# Patient Record
Sex: MALE | Race: WHITE | NOT HISPANIC OR LATINO | Employment: OTHER | URBAN - METROPOLITAN AREA
[De-identification: names, ages, dates, MRNs, and addresses within clinical notes are randomized per-mention and may not be internally consistent; named-entity substitution may affect disease eponyms.]

---

## 2017-03-20 NOTE — PROGRESS NOTES
[facility]  [address] [address] [id number] [phone]  Dept: [phone]    PRE-OP EVALUATION:  Today's date: 3/24/2017    Kameron Whitley (: 1950) presents for pre-operative evaluation assessment as requested by Dr. Haynes.  He requires evaluation and anesthesia risk assessment prior to undergoing surgery/procedure for treatment of eye surgery .  Proposed procedure: Catarac of Left Eye    Date of Surgery/ Procedure: 2017  Time of Surgery/ Procedure: [facility]  Hospital/Surgical Facility: [facility] [place]  Fax number for surgical facility: [phone]  Primary Physician: Angelica Bui  Type of Anesthesia Anticipated: to be determined    Patient has a Health Care Directive or Living Will:  NO    1. NO - Do you have a history of heart attack, stroke, stent, bypass or surgery on an artery in the head, neck, heart or legs?  2. NO - Do you ever have any pain or discomfort in your chest?  3. NO - Do you have a history of  Heart Failure?  4. NO - Are you troubled by shortness of breath when: walking on the level, up a slight hill or at night?  5. NO - Do you currently have a cold, bronchitis or other respiratory infection?  6. NO - Do you have a cough, shortness of breath or wheezing?  7. NO - Do you sometimes get pains in the calves of your legs when you walk?  8. NO - Do you or anyone in your family have previous history of blood clots?  9. NO - Do you or does anyone in your family have a serious bleeding problem such as prolonged bleeding following surgeries or cuts?  10. NO - Have you ever had problems with anemia or been told to take iron pills?  11. NO - Have you had any abnormal blood loss such as black, tarry or bloody stools, or abnormal vaginal bleeding?  12. NO - Have you ever had a blood transfusion?  13. NO - Have you or any of your relatives ever had problems with anesthesia?  14. NO - Do you have sleep apnea, excessive snoring or  daytime drowsiness?  15. NO - Do you have any prosthetic heart valves?  16. NO - Do you have prosthetic joints?  17. NO - Is there any chance that you may be pregnant?      Advance Care Planning 3/20/2017:  Discussed with patient, Information given to prepare    HPI:                                                      Brief HPI related to upcoming procedure: 66 year old male , scheduled for Cataract surgery , he has been having poor and cloudy vision       See problem list for active medical problems.  Problems all longstanding and stable, except as noted/documented.  See ROS for pertinent symptoms related to these conditions.                                                                                                  .    MEDICAL HISTORY:                                                      Patient Active Problem List    Diagnosis Date Noted     Cholelithiasis with acute cholecystitis without biliary obstruction 10/05/2016     Priority: Medium     Erectile dysfunction due to diseases classified elsewhere 09/28/2016     Priority: Medium     ED (erectile dysfunction) 01/06/2014     Groin mass 04/08/2013     CARDIOVASCULAR SCREENING; LDL GOAL LESS THAN 160 03/30/2012     Impacted cerumen 03/30/2012      Past Medical History:   Diagnosis Date     Closed fracture of clavicle     Age 2 - Right Clavicle     Past Surgical History:   Procedure Laterality Date     CARPAL TUNNEL RELEASE RT/LT  1/2006    right hand     COLONOSCOPY  4/2009    Normal-f/u 10 years (At [facility])     LAPAROSCOPIC CHOLECYSTECTOMY N/A 10/5/2016    Procedure: LAPAROSCOPIC CHOLECYSTECTOMY;  Surgeon: Nikita Anderson MD;  Location:  OR     LAPAROSCOPIC HERNIORRHAPHY INGUINAL BILATERAL  1/10/2014    Procedure: LAPAROSCOPIC HERNIORRHAPHY INGUINAL BILATERAL;  Laparoscopic Bilateral Inguinal Hernia Repair;  Surgeon: Nikita Anderson MD;  Location: [facility] OR     Current Outpatient Prescriptions   Medication Sig Dispense Refill     ketorolac (ACULAR) 0.5 %  "ophthalmic solution        ofloxacin (OCUFLOX) 0.3 % ophthalmic solution        prednisoLONE acetate (PRED FORTE) 1 % ophthalmic susp        sildenafil (VIAGRA) 100 MG cap/tab Take 1 tablet (100 mg) by mouth daily as needed for erectile dysfunction Take 30 min to 4 hours before intercourse.  Never use with nitroglycerin, terazosin or doxazosin. 8 tablet 11     LOW-DOSE ASPIRIN PO Take  by mouth.       omeprazole (PRILOSEC) 40 MG capsule Take 1 capsule (40 mg) by mouth daily Take 30-60 minutes before a meal. (Patient not taking: Reported on 3/24/2017) 30 capsule 1     OTC products: None, except as noted above    No Known Allergies   Latex Allergy: NO    Social History   Substance Use Topics     Smoking status: Former Smoker     Types: Cigarettes     Smokeless tobacco: Never Used     Alcohol use 0.0 oz/week     0 Standard drinks or equivalent per week      Comment: occ     History   Drug Use No       REVIEW OF SYSTEMS:                                                    C: NEGATIVE for fever, chills, change in weight  EYES: POSITIVE for blurred vision bilateral  E/M: NEGATIVE for ear, mouth and throat problems  R: NEGATIVE for significant cough or SOB  CV: NEGATIVE for chest pain, palpitations or peripheral edema    EXAM:                                                    /60 (BP Location: Left arm, Cuff Size: Adult Regular)  Pulse 64  Temp 96.7  F (35.9  C) (Temporal)  Resp 16  Ht 5' 5\" (1.651 m)  Wt 169 lb 4.8 oz (76.8 kg)  SpO2 97%  BMI 28.17 kg/m2  GENERAL APPEARANCE: healthy, alert and no distress  HENT: ear canals and TM's normal and nose and mouth without ulcers or lesions  RESP: lungs clear to auscultation - no rales, rhonchi or wheezes  CV: regular rate and rhythm, normal S1 S2, no S3 or S4 and no murmur, click or rub   ABDOMEN: soft, nontender, no HSM or masses and bowel sounds normal  NEURO: Normal strength and tone, sensory exam grossly normal, mentation intact and speech normal    DIAGNOSTICS: "                                                    No labs or EKG required for low risk surgery (cataract, skin procedure, breast biopsy, etc)    Recent Labs   Lab Test  10/04/16   2215  01/06/14   0919  03/30/12   0929   HGB  15.6  15.4  15.7   PLT  212   --   205   NA  140   --    --    POTASSIUM  4.0   --    --    CR  1.14   --    --         IMPRESSION:                                                    Reason for surgery/procedure: Blurry Vision   Diagnosis/reason for consult: Pre op evaluu    The proposed surgical procedure is considered LOW risk.    REVISED CARDIAC RISK INDEX  The patient has the following serious cardiovascular risks for perioperative complications such as (MI, PE, VFib and 3  AV Block):  No serious cardiac risks  INTERPRETATION: 0 risks: Class I (very low risk - 0.4% complication rate)    The patient has the following additional risks for perioperative complications:  No identified additional risks      ICD-10-CM    1. Preop general physical exam Z01.818    2. Cataract H26.9     left eye   3. Erectile dysfunction due to diseases classified elsewhere N52.1 sildenafil (VIAGRA) 100 MG cap/tab     DISCONTINUED: sildenafil (VIAGRA) 100 MG cap/tab       RECOMMENDATIONS:                                                      --Consult hospital rounder / IM to assist post-op medical management PRN     --Patient is to take all scheduled medications on the day of surgery EXCEPT for modifications listed below.    APPROVAL GIVEN to proceed with proposed procedure, without further diagnostic evaluation       Signed Electronically by: Angelica Bui MD, MD    Copy of this evaluation report is provided to requesting physician.    [facility] Preop Guidelines

## 2017-03-24 ENCOUNTER — OFFICE VISIT (OUTPATIENT)
Dept: FAMILY MEDICINE | Facility: OTHER | Age: 67
End: 2017-03-24
Payer: COMMERCIAL

## 2017-03-24 VITALS
SYSTOLIC BLOOD PRESSURE: 110 MMHG | HEART RATE: 64 BPM | TEMPERATURE: 96.7 F | RESPIRATION RATE: 16 BRPM | BODY MASS INDEX: 28.21 KG/M2 | DIASTOLIC BLOOD PRESSURE: 60 MMHG | WEIGHT: 169.3 LBS | OXYGEN SATURATION: 97 % | HEIGHT: 65 IN

## 2017-03-24 DIAGNOSIS — N52.1 ERECTILE DYSFUNCTION DUE TO DISEASES CLASSIFIED ELSEWHERE: ICD-10-CM

## 2017-03-24 DIAGNOSIS — Z01.818 PREOP GENERAL PHYSICAL EXAM: Primary | ICD-10-CM

## 2017-03-24 DIAGNOSIS — H26.9 CATARACT: ICD-10-CM

## 2017-03-24 PROCEDURE — 99214 OFFICE O/P EST MOD 30 MIN: CPT | Performed by: FAMILY MEDICINE

## 2017-03-24 RX ORDER — SILDENAFIL 100 MG/1
100 TABLET, FILM COATED ORAL DAILY PRN
Qty: 8 TABLET | Refills: 11 | Status: SHIPPED | OUTPATIENT
Start: 2017-03-24 | End: 2017-03-24

## 2017-03-24 RX ORDER — KETOROLAC TROMETHAMINE 5 MG/ML
SOLUTION OPHTHALMIC
COMMUNITY
Start: 2017-03-17 | End: 2019-02-08

## 2017-03-24 RX ORDER — OFLOXACIN 3 MG/ML
SOLUTION/ DROPS OPHTHALMIC
COMMUNITY
Start: 2017-03-17 | End: 2019-02-08

## 2017-03-24 RX ORDER — PREDNISOLONE ACETATE 10 MG/ML
SUSPENSION/ DROPS OPHTHALMIC
COMMUNITY
Start: 2017-03-14 | End: 2018-11-16

## 2017-03-24 RX ORDER — SILDENAFIL 100 MG/1
100 TABLET, FILM COATED ORAL DAILY PRN
Qty: 8 TABLET | Refills: 11 | Status: SHIPPED | OUTPATIENT
Start: 2017-03-24 | End: 2017-11-09

## 2017-03-24 ASSESSMENT — PAIN SCALES - GENERAL: PAINLEVEL: NO PAIN (0)

## 2017-03-24 NOTE — NURSING NOTE
"Chief Complaint   Patient presents with     Pre-Op Exam     Panel Management     Pao Bower       Initial /60 (BP Location: Left arm, Cuff Size: Adult Regular)  Pulse 64  Temp 96.7  F (35.9  C) (Temporal)  Resp 16  Ht 5' 5\" (1.651 m)  Wt 169 lb 4.8 oz (76.8 kg)  SpO2 97%  BMI 28.17 kg/m2 Estimated body mass index is 28.17 kg/(m^2) as calculated from the following:    Height as of this encounter: 5' 5\" (1.651 m).    Weight as of this encounter: 169 lb 4.8 oz (76.8 kg).  Medication Reconciliation: complete     Marleny Adams MA    "

## 2017-03-24 NOTE — MR AVS SNAPSHOT
After Visit Summary   3/24/2017    Kameron Whitley    MRN: [id number]           Patient Information     Date Of Birth          1950        Visit Information        Provider Department      3/24/2017 8:40 AM Angelica Bui MD [facility]        Today's Diagnoses     Preop general physical exam    -  1    Erectile dysfunction due to diseases classified elsewhere          Care Instructions      Before Your Surgery      Call your surgeon if there is any change in your health. This includes signs of a cold or flu (such as a sore throat, runny nose, cough, rash or fever).    Do not smoke, drink alcohol or take over the counter medicine (unless your surgeon or primary care doctor tells you to) for the 24 hours before and after surgery.    If you take prescribed drugs: Follow your doctor s orders about which medicines to take and which to stop until after surgery.    Eating and drinking prior to surgery: follow the instructions from your surgeon    Take a shower or bath the night before surgery. Use the soap your surgeon gave you to gently clean your skin. If you do not have soap from your surgeon, use your regular soap. Do not shave or scrub the surgery site.  Wear clean pajamas and have clean sheets on your bed.         Follow-ups after your visit        Who to contact     If you have questions or need follow up information about today's clinic visit or your schedule please contact [facility] directly at [phone].  Normal or non-critical lab and imaging results will be communicated to you by MyChart, letter or phone within 4 business days after the clinic has received the results. If you do not hear from us within 7 days, please contact the clinic through MyChart or phone. If you have a critical or abnormal lab result, we will notify you by phone as soon as possible.  Submit refill requests through "[employer] or call your pharmacy and they will forward the  "refill request to us. Please allow 3 business days for your refill to be completed.          Additional Information About Your Visit        MyChart Information     Access Closure lets you send messages to your doctor, view your test results, renew your prescriptions, schedule appointments and more. To sign up, go to [email or web address] . Click on \"Log in\" on the left side of the screen, which will take you to the Welcome page. Then click on \"Sign up Now\" on the right side of the page.     You will be asked to enter the access code listed below, as well as some personal information. Please follow the directions to create your username and password.     Your access code is: [email or web address]  Expires: 2017  9:19 AM     Your access code will  in 90 days. If you need help or a new code, please call your [place] clinic or [phone].        Care EveryWhere ID     This is your Care EveryWhere ID. This could be used by other organizations to access your [place] medical records  [id number]        Your Vitals Were     Pulse Temperature Respirations Height Pulse Oximetry BMI (Body Mass Index)    64 96.7  F (35.9  C) (Temporal) 16 5' 5\" (1.651 m) 97% 28.17 kg/m2       Blood Pressure from Last 3 Encounters:   17 110/60   10/24/16 120/76   10/05/16 114/84    Weight from Last 3 Encounters:   17 169 lb 4.8 oz (76.8 kg)   10/24/16 165 lb 14.4 oz (75.3 kg)   10/14/16 165 lb (74.8 kg)              Today, you had the following     No orders found for display         Today's Medication Changes          These changes are accurate as of: 3/24/17  9:19 AM.  If you have any questions, ask your nurse or doctor.               These medicines have changed or have updated prescriptions.        Dose/Directions    sildenafil 100 MG cap/tab   Commonly known as:  VIAGRA   This may have changed:    - medication strength  - how much to take   Used for:  Erectile dysfunction due to diseases classified elsewhere   Changed by:  " Angelica Bui MD        Dose:  100 mg   Take 1 tablet (100 mg) by mouth daily as needed for erectile dysfunction Take 30 min to 4 hours before intercourse.  Never use with nitroglycerin, terazosin or doxazosin.   Quantity:  8 tablet   Refills:  11            Where to get your medicines      Some of these will need a paper prescription and others can be bought over the counter.  Ask your nurse if you have questions.     Bring a paper prescription for each of these medications     sildenafil 100 MG cap/tab                Primary Care Provider Office Phone # Fax #    Angelica Bui [phone]       [facility] [address] [place] [address]        Thank you!     Thank you for choosing [facility]  for your care. Our goal is always to provide you with excellent care. Hearing back from our patients is one way we can continue to improve our services. Please take a few minutes to complete the written survey that you may receive in the mail after your visit with us. Thank you!             Your Updated Medication List - Protect others around you: Learn how to safely use, store and throw away your medicines at www.disposemymeds.org.          This list is accurate as of: 3/24/17  9:19 AM.  Always use your most recent med list.                   Brand Name Dispense Instructions for use    ketorolac 0.5 % ophthalmic solution    ACULAR         LOW-DOSE ASPIRIN PO      Take  by mouth.       ofloxacin 0.3 % ophthalmic solution    OCUFLOX         omeprazole 40 MG capsule    priLOSEC    30 capsule    Take 1 capsule (40 mg) by mouth daily Take 30-60 minutes before a meal.       prednisoLONE acetate 1 % ophthalmic susp    PRED FORTE         sildenafil 100 MG cap/tab    VIAGRA    8 tablet    Take 1 tablet (100 mg) by mouth daily as needed for erectile dysfunction Take 30 min to 4 hours before intercourse.  Never use with nitroglycerin, terazosin or doxazosin.

## 2017-05-01 NOTE — PROGRESS NOTES
[facility]  [address] [id number] [phone]  Dept: [phone]    PRE-OP EVALUATION:  Today's date: 5/3/2017    Kameron Whitley (: 1950) presents for pre-operative evaluation assessment as requested by Dr. Haynes.  He requires evaluation and anesthesia risk assessment prior to undergoing surgery/procedure for treatment of eye surgery .  Proposed procedure: Cataract of Right Eye    Date of Surgery/ Procedure: 2017  Time of Surgery/ Procedure: [facility]  Hospital/Surgical Facility: [facility]  Fax number for surgical facility: [phone]  Primary Physician: Angelica Bui  Type of Anesthesia Anticipated: to be determined    Patient has a Health Care Directive or Living Will:  NO    1. NO - Do you have a history of heart attack, stroke, stent, bypass or surgery on an artery in the head, neck, heart or legs?  2. NO - Do you ever have any pain or discomfort in your chest?  3. NO - Do you have a history of  Heart Failure?  4. NO - Are you troubled by shortness of breath when: walking on the level, up a slight hill or at night?  5. NO - Do you currently have a cold, bronchitis or other respiratory infection?  6. NO - Do you have a cough, shortness of breath or wheezing?  7. NO - Do you sometimes get pains in the calves of your legs when you walk?  8. NO - Do you or anyone in your family have previous history of blood clots?  9. NO - Do you or does anyone in your family have a serious bleeding problem such as prolonged bleeding following surgeries or cuts?  10. NO - Have you ever had problems with anemia or been told to take iron pills?  11. NO - Have you had any abnormal blood loss such as black, tarry or bloody stools, or abnormal vaginal bleeding?  12. NO - Have you ever had a blood transfusion?  13. Yes - Have you or any of your relatives ever had problems with anesthesia?  14. NO - Do you have sleep apnea, excessive snoring  or daytime drowsiness?  15. NO - Do you have any prosthetic heart valves?  16. NO - Do you have prosthetic joints?  17. NO - Is there any chance that you may be pregnant?      HPI:                                                      Brief HPI related to upcoming procedure: Brief HPI related to upcoming procedure: 66 year old male , scheduled for Cataract surgery , he has been having poor and cloudy vision . Recently had left eye done and now scheduled for the right         See problem list for active medical problems. Problems all longstanding and stable, except as noted/documented. See ROS for pertinent symptoms related to these conditions.         MEDICAL HISTORY:                                                      Patient Active Problem List    Diagnosis Date Noted     Cholelithiasis with acute cholecystitis without biliary obstruction 10/05/2016     Priority: Medium     Erectile dysfunction due to diseases classified elsewhere 09/28/2016     Priority: Medium     ED (erectile dysfunction) 01/06/2014     Groin mass 04/08/2013     CARDIOVASCULAR SCREENING; LDL GOAL LESS THAN 160 03/30/2012     Impacted cerumen 03/30/2012      Past Medical History:   Diagnosis Date     Closed fracture of clavicle     Age 2 - Right Clavicle     Past Surgical History:   Procedure Laterality Date     CARPAL TUNNEL RELEASE RT/LT  1/2006    right hand     COLONOSCOPY  4/2009    Normal-f/u 10 years (At [facility])     LAPAROSCOPIC CHOLECYSTECTOMY N/A 10/5/2016    Procedure: LAPAROSCOPIC CHOLECYSTECTOMY;  Surgeon: Nikita Anderson MD;  Location:  OR     LAPAROSCOPIC HERNIORRHAPHY INGUINAL BILATERAL  1/10/2014    Procedure: LAPAROSCOPIC HERNIORRHAPHY INGUINAL BILATERAL;  Laparoscopic Bilateral Inguinal Hernia Repair;  Surgeon: Nikita Anderson MD;  Location:  OR     Current Outpatient Prescriptions   Medication Sig Dispense Refill     ketorolac (ACULAR) 0.5 % ophthalmic solution        ofloxacin (OCUFLOX) 0.3 % ophthalmic solution     "    prednisoLONE acetate (PRED FORTE) 1 % ophthalmic susp        sildenafil (VIAGRA) 100 MG cap/tab Take 1 tablet (100 mg) by mouth daily as needed for erectile dysfunction Take 30 min to 4 hours before intercourse.  Never use with nitroglycerin, terazosin or doxazosin. 8 tablet 11     LOW-DOSE ASPIRIN PO Take  by mouth.       omeprazole (PRILOSEC) 40 MG capsule Take 1 capsule (40 mg) by mouth daily Take 30-60 minutes before a meal. (Patient not taking: Reported on 5/3/2017) 30 capsule 1     OTC products: None, except as noted above    No Known Allergies   Latex Allergy: NO    Social History   Substance Use Topics     Smoking status: Former Smoker     Types: Cigarettes     Smokeless tobacco: Never Used     Alcohol use 0.0 oz/week     0 Standard drinks or equivalent per week      Comment: occ     History   Drug Use No       REVIEW OF SYSTEMS:                                                    C: NEGATIVE for fever, chills, change in weight  EYES: POSITIVE for visual  E/M: NEGATIVE for ear, mouth and throat problems  R: NEGATIVE for significant cough or SOB  CV: NEGATIVE for chest pain, palpitations or peripheral edema    EXAM:                                                    /68  Pulse 55  Temp 97.4  F (36.3  C) (Temporal)  Resp 16  Ht 5' 5\" (1.651 m)  Wt 167 lb 3.2 oz (75.8 kg)  SpO2 99%  BMI 27.82 kg/m2  GENERAL APPEARANCE:  alert and no distress  HENT: ear canals and TM's normal and nose and mouth without ulcers or lesions  RESP: lungs clear to auscultation - no rales, rhonchi or wheezes  CV: regular rate and rhythm, normal S1 S2, no S3 or S4 and no murmur, click or rub   ABDOMEN: soft, nontender, no HSM or masses and bowel sounds normal  NEURO: Normal strength and tone, sensory exam grossly normal, mentation intact and speech normal    DIAGNOSTICS:                                                    No labs or EKG required for low risk surgery (cataract, skin procedure, breast biopsy, etc)    Recent " Labs   Lab Test  10/04/16   2215  01/06/14   0919  03/30/12   0929   HGB  15.6  15.4  15.7   PLT  212   --   205   NA  140   --    --    POTASSIUM  4.0   --    --    CR  1.14   --    --         IMPRESSION:                                                    Reason for surgery/procedure: Visual problems  Diagnosis/reason for consult: Pre op evaluation for anesthetic risk     The proposed surgical procedure is considered LOW risk.    REVISED CARDIAC RISK INDEX  The patient has the following serious cardiovascular risks for perioperative complications such as (MI, PE, VFib and 3  AV Block):  No serious cardiac risks  INTERPRETATION: 0 risks: Class I (very low risk - 0.4% complication rate)    The patient has the following additional risks for perioperative complications:  No identified additional risks      ICD-10-CM    1. Preop general physical exam Z01.818        RECOMMENDATIONS:                                                      --Consult hospital rounder / IM to assist post-op medical management PRN     --Patient is to take all scheduled medications on the day of surgery EXCEPT for modifications listed below.    APPROVAL GIVEN to proceed with proposed procedure, without further diagnostic evaluation       Signed Electronically by: Angelica Bui MD, MD    Copy of this evaluation report is provided to requesting physician.    [place] Preop Guidelines

## 2017-05-03 ENCOUNTER — OFFICE VISIT (OUTPATIENT)
Dept: FAMILY MEDICINE | Facility: OTHER | Age: 67
End: 2017-05-03
Payer: COMMERCIAL

## 2017-05-03 VITALS
BODY MASS INDEX: 27.86 KG/M2 | DIASTOLIC BLOOD PRESSURE: 68 MMHG | SYSTOLIC BLOOD PRESSURE: 116 MMHG | HEART RATE: 55 BPM | RESPIRATION RATE: 16 BRPM | TEMPERATURE: 97.4 F | WEIGHT: 167.2 LBS | HEIGHT: 65 IN | OXYGEN SATURATION: 99 %

## 2017-05-03 DIAGNOSIS — H26.9 CATARACT: ICD-10-CM

## 2017-05-03 DIAGNOSIS — Z01.818 PREOP GENERAL PHYSICAL EXAM: Primary | ICD-10-CM

## 2017-05-03 PROCEDURE — 99214 OFFICE O/P EST MOD 30 MIN: CPT | Performed by: FAMILY MEDICINE

## 2017-05-03 ASSESSMENT — PAIN SCALES - GENERAL: PAINLEVEL: NO PAIN (0)

## 2017-05-03 NOTE — MR AVS SNAPSHOT
After Visit Summary   5/3/2017    Kameron Whitley    MRN: [id number]           Patient Information     Date Of Birth          1950        Visit Information        Provider Department      5/3/2017 8:40 AM Angelica Bui MD [facility]        Today's Diagnoses     Preop general physical exam    -  1      Care Instructions      Before Your Surgery      Call your surgeon if there is any change in your health. This includes signs of a cold or flu (such as a sore throat, runny nose, cough, rash or fever).    Do not smoke, drink alcohol or take over the counter medicine (unless your surgeon or primary care doctor tells you to) for the 24 hours before and after surgery.    If you take prescribed drugs: Follow your doctor s orders about which medicines to take and which to stop until after surgery.    Eating and drinking prior to surgery: follow the instructions from your surgeon    Take a shower or bath the night before surgery. Use the soap your surgeon gave you to gently clean your skin. If you do not have soap from your surgeon, use your regular soap. Do not shave or scrub the surgery site.  Wear clean pajamas and have clean sheets on your bed.         Follow-ups after your visit        Who to contact     If you have questions or need follow up information about today's clinic visit or your schedule please contact [facility] directly at [phone].  Normal or non-critical lab and imaging results will be communicated to you by MyChart, letter or phone within 4 business days after the clinic has received the results. If you do not hear from us within 7 days, please contact the clinic through MyChart or phone. If you have a critical or abnormal lab result, we will notify you by phone as soon as possible.  Submit refill requests through [employer] or call your pharmacy and they will forward the refill request to us. Please allow 3 business days for your refill to  "be completed.          Additional Information About Your Visit        [employer] Information     [email or web address] lets you send messages to your doctor, view your test results, renew your prescriptions, schedule appointments and more. To sign up, go to [email or web address] . Click on \"Log in\" on the left side of the screen, which will take you to the Welcome page. Then click on \"Sign up Now\" on the right side of the page.     You will be asked to enter the access code listed below, as well as some personal information. Please follow the directions to create your username and password.     Your access code is: [email or web address]  Expires: 2017  9:19 AM     Your access code will  in 90 days. If you need help or a new code, please call your [facility] clinic or [phone].        Care EveryWhere ID     This is your Care EveryWhere ID. This could be used by other organizations to access your [facility] medical records  [id number]        Your Vitals Were     Pulse Temperature Respirations Height Pulse Oximetry BMI (Body Mass Index)    55 97.4  F (36.3  C) (Temporal) 16 5' 5\" (1.651 m) 99% 27.82 kg/m2       Blood Pressure from Last 3 Encounters:   17 116/68   17 110/60   10/24/16 120/76    Weight from Last 3 Encounters:   17 167 lb 3.2 oz (75.8 kg)   17 169 lb 4.8 oz (76.8 kg)   10/24/16 165 lb 14.4 oz (75.3 kg)              Today, you had the following     No orders found for display       Primary Care Provider Office Phone # Fax #    Angelica Bui [phone]       [facility] [address] [place] [address]        Thank you!     Thank you for choosing [facility]  for your care. Our goal is always to provide you with excellent care. Hearing back from our patients is one way we can continue to improve our services. Please take a few minutes to complete the written survey that you may receive in the mail after your visit with us. Thank you!      "        Your Updated Medication List - Protect others around you: Learn how to safely use, store and throw away your medicines at www.disposemymeds.org.          This list is accurate as of: 5/3/17  9:08 AM.  Always use your most recent med list.                   Brand Name Dispense Instructions for use    ketorolac 0.5 % ophthalmic solution    ACULAR         LOW-DOSE ASPIRIN PO      Take  by mouth.       ofloxacin 0.3 % ophthalmic solution    OCUFLOX         omeprazole 40 MG capsule    priLOSEC    30 capsule    Take 1 capsule (40 mg) by mouth daily Take 30-60 minutes before a meal.       prednisoLONE acetate 1 % ophthalmic susp    PRED FORTE         sildenafil 100 MG cap/tab    VIAGRA    8 tablet    Take 1 tablet (100 mg) by mouth daily as needed for erectile dysfunction Take 30 min to 4 hours before intercourse.  Never use with nitroglycerin, terazosin or doxazosin.

## 2017-05-03 NOTE — NURSING NOTE
"Chief Complaint   Patient presents with     Pre-Op Exam     Panel Management     MyChart, Honoring judy       Initial /68  Pulse 55  Temp 97.4  F (36.3  C) (Temporal)  Resp 16  Ht 5' 5\" (1.651 m)  Wt 167 lb 3.2 oz (75.8 kg)  SpO2 99%  BMI 27.82 kg/m2 Estimated body mass index is 27.82 kg/(m^2) as calculated from the following:    Height as of this encounter: 5' 5\" (1.651 m).    Weight as of this encounter: 167 lb 3.2 oz (75.8 kg).  Medication Reconciliation: complete       Marleny Adams MA    "

## 2017-11-07 NOTE — PATIENT INSTRUCTIONS
Preventive Health Recommendations:   Male Ages 65 and over    Yearly exam:             See your health care provider every year in order to  o   Review health changes.   o   Discuss preventive care.    o   Review your medicines if your doctor has prescribed any.    Talk with your health care provider about whether you should have a test to screen for prostate cancer (PSA).    Every 3 years, have a diabetes test (fasting glucose). If you are at risk for diabetes, you should have this test more often.    Every 5 years, have a cholesterol test. Have this test more often if you are at risk for high cholesterol or heart disease.     Every 10 years, have a colonoscopy. Or, have a yearly FIT test (stool test). These exams will check for colon cancer.    Talk to with your health care provider about screening for Abdominal Aortic Aneurysm if you have a family history of AAA or have a history of smoking.    Shots:     Get a flu shot each year.     Get a tetanus shot every 10 years.     Talk to your doctor about your pneumonia vaccines. There are now two you should receive - Pneumovax (PPSV 23) and Prevnar (PCV 13).     Talk to your doctor about a shingles vaccine.     Talk to your doctor about the hepatitis B vaccine.  Nutrition:     Eat at least 5 servings of fruits and vegetables each day.     Eat whole-grain bread, whole-wheat pasta and brown rice instead of white grains and rice.     Talk to your provider about Calcium and Vitamin D.   Lifestyle    Exercise for at least 150 minutes a week (30 minutes a day, 5 days a week). This will help you control your weight and prevent disease.     Limit alcohol to one drink per day.     No smoking.     Wear sunscreen to prevent skin cancer.     See your dentist every six months for an exam and cleaning.     See your eye doctor every 1 to 2 years to screen for conditions such as glaucoma, macular degeneration, cataracts, etc 9

## 2017-11-07 NOTE — PROGRESS NOTES
SUBJECTIVE:   Kameron Whitley is a 67 year old male who presents for Preventive Visit.  Are you in the first 12 months of your Medicare coverage?  No    HPI     The 10-year ASCVD risk score (Angy DEE Jr, et al., 2013) is: 11.6%    Values used to calculate the score:      Age: 67 years      Sex: Male      Is Non- : No      Diabetic: No      Tobacco smoker: No      Systolic Blood Pressure: 116 mmHg      Is BP treated: No      HDL Cholesterol: 86 mg/dL      Total Cholesterol: 215 mg/dL  Patient is eligible for use of low-dose aspirin for primary prevention of heart attack and stroke.  Provider has discussed aspirin with patient and our decision was:     Prescribe:  Daily low-dose aspirin recommended for primary prevention, patient agrees with plan.          COGNITIVE SCREEN  1) Repeat 3 items (Banana, Sunrise, Chair)    2) Clock draw: NORMAL  3) 3 item recall: Recalls 3 objects  Results: 3 items recalled: COGNITIVE IMPAIRMENT LESS LIKELY    Mini-CogTM Copyright S Miko. Licensed by the author for use in [facility]; reprinted with permission (isrrael@[facility]). All rights reserved.          Reviewed and updated as needed this visit by clinical staff         Reviewed and updated as needed this visit by Provider      Social History   Substance Use Topics     Smoking status: Former Smoker     Types: Cigarettes     Smokeless tobacco: Never Used     Alcohol use 0.0 oz/week     0 Standard drinks or equivalent per week      Comment: occ       The patient does not drink >3 drinks per day nor >7 drinks per week.        Today's PHQ-2 Score: PHQ-2 ( 1999 Pfizer) 10/24/2016   Q1: Little interest or pleasure in doing things 0   Q2: Feeling down, depressed or hopeless 0   PHQ-2 Score 0       Do you feel safe in your environment - Yes    Do you have a Health Care Directive?: No: Advance care planning reviewed with patient; information given to patient to review.      Current providers sharing in care  for this patient include: No care team member to display      Hearing impairment: No    Ability to successfully perform activities of daily living: Yes, no assistance needed     Fall risk:         Home safety:  none identified      The following health maintenance items are reviewed in Epic and correct as of today:Health Maintenance   Topic Date Due     ADVANCE DIRECTIVE PLANNING Q5 YRS  09/17/2003     INFLUENZA VACCINE (SYSTEM ASSIGNED)  09/01/2017     FALL RISK ASSESSMENT  09/28/2017     LIPID SCREEN Q5 YR MALE (SYSTEM ASSIGNED)  09/28/2021     COLON CANCER SCREEN (SYSTEM ASSIGNED)  08/26/2023     TETANUS IMMUNIZATION (SYSTEM ASSIGNED)  03/06/2025     PNEUMOCOCCAL  Completed     AORTIC ANEURYSM SCREENING (SYSTEM ASSIGNED)  Completed     HEPATITIS C SCREENING  Completed     Labs reviewed in EPIC  BP Readings from Last 3 Encounters:   11/09/17 132/80   05/03/17 116/68   03/24/17 110/60    Wt Readings from Last 3 Encounters:   11/09/17 169 lb 9.6 oz (76.9 kg)   05/03/17 167 lb 3.2 oz (75.8 kg)   03/24/17 169 lb 4.8 oz (76.8 kg)                  Patient Active Problem List   Diagnosis     CARDIOVASCULAR SCREENING; LDL GOAL LESS THAN 160     Impacted cerumen     Groin mass     ED (erectile dysfunction)     Erectile dysfunction due to diseases classified elsewhere     Cholelithiasis with acute cholecystitis without biliary obstruction     Hyperlipidemia LDL goal <130     Elevated blood pressure reading without diagnosis of hypertension     Disturbance of skin sensation     Past Surgical History:   Procedure Laterality Date     CARPAL TUNNEL RELEASE RT/LT  1/2006    right hand     COLONOSCOPY  4/2009    Normal-f/u 10 years (At [facility])     LAPAROSCOPIC CHOLECYSTECTOMY N/A 10/5/2016    Procedure: LAPAROSCOPIC CHOLECYSTECTOMY;  Surgeon: Nikita Anderson MD;  Location: [facility] OR     LAPAROSCOPIC HERNIORRHAPHY INGUINAL BILATERAL  1/10/2014    Procedure: LAPAROSCOPIC HERNIORRHAPHY INGUINAL BILATERAL;  Laparoscopic Bilateral  "Inguinal Hernia Repair;  Surgeon: Nikita Anderson MD;  Location:  OR       Social History   Substance Use Topics     Smoking status: Former Smoker     Types: Cigarettes     Smokeless tobacco: Never Used     Alcohol use 0.0 oz/week     0 Standard drinks or equivalent per week      Comment: occ     Family History   Problem Relation Age of Onset     DIABETES Mother      Arthritis Mother      DIABETES Brother      DIABETES Brother          Current Outpatient Prescriptions   Medication Sig Dispense Refill     sildenafil (VIAGRA) 100 MG tablet Take 1 tablet (100 mg) by mouth daily as needed Take 30 min to 4 hours before intercourse.  Never use with nitroglycerin, terazosin or doxazosin. 8 tablet 11     LOW-DOSE ASPIRIN PO Take  by mouth.       ketorolac (ACULAR) 0.5 % ophthalmic solution        ofloxacin (OCUFLOX) 0.3 % ophthalmic solution        prednisoLONE acetate (PRED FORTE) 1 % ophthalmic susp        [DISCONTINUED] sildenafil (VIAGRA) 100 MG cap/tab Take 1 tablet (100 mg) by mouth daily as needed for erectile dysfunction Take 30 min to 4 hours before intercourse.  Never use with nitroglycerin, terazosin or doxazosin. 8 tablet 11     omeprazole (PRILOSEC) 40 MG capsule Take 1 capsule (40 mg) by mouth daily Take 30-60 minutes before a meal. (Patient not taking: Reported on 5/3/2017) 30 capsule 1     No Known Allergies        Review of Systems  Constitutional, HEENT, cardiovascular, pulmonary, GI, , musculoskeletal, neuro, skin, endocrine and psych systems are negative, except as otherwise noted.      OBJECTIVE:   There were no vitals taken for this visit. Estimated body mass index is 27.82 kg/(m^2) as calculated from the following:    Height as of 5/3/17: 5' 5\" (1.651 m).    Weight as of 5/3/17: 167 lb 3.2 oz (75.8 kg).  Physical Exam  GENERAL: healthy, alert and no distress  EYES: Eyes grossly normal to inspection, PERRL and conjunctivae and sclerae normal  HENT: ear canals and TM's normal, nose and mouth " without ulcers or lesions  NECK: no adenopathy, no asymmetry, masses, or scars and thyroid normal to palpation  RESP: lungs clear to auscultation - no rales, rhonchi or wheezes  CV: regular rate and rhythm, normal S1 S2, no S3 or S4, no murmur, click or rub, no peripheral edema and peripheral pulses strong  ABDOMEN: soft, nontender, no hepatosplenomegaly, no masses and bowel sounds normal  MS: mild arthritic changes noted especially to the right index finger.  no gross musculoskeletal defects noted, no edema  SKIN: no suspicious lesions or rashes  NEURO: Normal strength and tone, mentation intact and speech normal  PSYCH: mentation appears normal, affect normal/bright    ASSESSMENT / PLAN:   1. Routine general medical examination at a health care facility  As above doing well  - CBC with platelets  - Comprehensive metabolic panel  - Lipid panel reflex to direct LDL Fasting    2. At risk for falling      3. Need for prophylactic vaccination and inoculation against influenza  declines    4. Elevated blood pressure reading without diagnosis of hypertension  Recheck in 2 weeks  - CBC with platelets  - Comprehensive metabolic panel    5. Hyperlipidemia LDL goal <130  Due for labsa  - Comprehensive metabolic panel  - Lipid panel reflex to direct LDL Fasting    6. Finger deformity, acquired, right  Arthritis suspected  - ORTHOPEDICS ADULT REFERRAL    7. Arthritis  - ORTHOPEDICS ADULT REFERRAL    8. Erectile dysfunction due to diseases classified elsewhere  refilled  - sildenafil (VIAGRA) 100 MG tablet; Take 1 tablet (100 mg) by mouth daily as needed Take 30 min to 4 hours before intercourse.  Never use with nitroglycerin, terazosin or doxazosin.  Dispense: 8 tablet; Refill: 11    End of Life Planning:  Patient currently has an advanced directive: No.  I have verified the patient's ablity to prepare an advanced directive/make health care decisions.  Literature was provided to assist patient in preparing an advanced  "directive.    COUNSELING:  Reviewed preventive health counseling, as reflected in patient instructions       Consider AAA screening for ages 65-75 and smoking history       Regular exercise       Healthy diet/nutrition       Vision screening       Hearing screening       Dental care    BP Screening:   Last 3 BP Readings:    BP Readings from Last 3 Encounters:   11/09/17 132/80   05/03/17 116/68   03/24/17 110/60       The following was recommended to the patient:  Re-screen within 4 weeks and recommend lifestyle modifications    Estimated body mass index is 27.82 kg/(m^2) as calculated from the following:    Height as of 5/3/17: 5' 5\" (1.651 m).    Weight as of 5/3/17: 167 lb 3.2 oz (75.8 kg).  Weight management plan: Discussed healthy diet and exercise guidelines and patient will follow up in 6 months in clinic to re-evaluate.   reports that he has quit smoking. His smoking use included Cigarettes. He has never used smokeless tobacco.        Appropriate preventive services were discussed with this patient, including applicable screening as appropriate for cardiovascular disease, diabetes, osteopenia/osteoporosis, and glaucoma.  As appropriate for age/gender, discussed screening for colorectal cancer, prostate cancer, breast cancer, and cervical cancer. Checklist reviewing preventive services available has been given to the patient.    Reviewed patients plan of care and provided an AVS. The Basic Care Plan (routine screening as documented in Health Maintenance) for Kameron meets the Care Plan requirement. This Care Plan has been established and reviewed with the Patient.    Counseling Resources:  ATP IV Guidelines  Pooled Cohorts Equation Calculator  Breast Cancer Risk Calculator  FRAX Risk Assessment  ICSI Preventive Guidelines  Dietary Guidelines for Americans, 2010  USDA's MyPlate  ASA Prophylaxis  Lung CA Screening    Nicola Banerjee PA-C  [facility] for HPI/ROS submitted by the patient on " 11/9/2017   PHQ-2 Score: 0  If you checked off any problems, how difficult have these problems made it for you to do your work, take care of things at home, or get along with other people?: Not difficult at all  PHQ9 TOTAL SCORE: 0  PETER 7 TOTAL SCORE: 0

## 2017-11-09 ENCOUNTER — OFFICE VISIT (OUTPATIENT)
Dept: FAMILY MEDICINE | Facility: OTHER | Age: 67
End: 2017-11-09
Payer: COMMERCIAL

## 2017-11-09 VITALS
TEMPERATURE: 97.7 F | SYSTOLIC BLOOD PRESSURE: 132 MMHG | BODY MASS INDEX: 28.95 KG/M2 | HEIGHT: 64 IN | WEIGHT: 169.6 LBS | HEART RATE: 52 BPM | DIASTOLIC BLOOD PRESSURE: 80 MMHG | RESPIRATION RATE: 18 BRPM

## 2017-11-09 DIAGNOSIS — N52.1 ERECTILE DYSFUNCTION DUE TO DISEASES CLASSIFIED ELSEWHERE: ICD-10-CM

## 2017-11-09 DIAGNOSIS — Z23 NEED FOR PROPHYLACTIC VACCINATION AND INOCULATION AGAINST INFLUENZA: ICD-10-CM

## 2017-11-09 DIAGNOSIS — M19.90 ARTHRITIS: ICD-10-CM

## 2017-11-09 DIAGNOSIS — R03.0 ELEVATED BLOOD PRESSURE READING WITHOUT DIAGNOSIS OF HYPERTENSION: ICD-10-CM

## 2017-11-09 DIAGNOSIS — Z91.81 AT RISK FOR FALLING: ICD-10-CM

## 2017-11-09 DIAGNOSIS — E78.5 HYPERLIPIDEMIA LDL GOAL <130: ICD-10-CM

## 2017-11-09 DIAGNOSIS — M20.001 FINGER DEFORMITY, ACQUIRED, RIGHT: ICD-10-CM

## 2017-11-09 DIAGNOSIS — Z00.00 ROUTINE GENERAL MEDICAL EXAMINATION AT A HEALTH CARE FACILITY: Primary | ICD-10-CM

## 2017-11-09 LAB
ALBUMIN SERPL-MCNC: 3.5 G/DL (ref 3.4–5)
ALP SERPL-CCNC: 65 U/L (ref 40–150)
ALT SERPL W P-5'-P-CCNC: 30 U/L (ref 0–70)
ANION GAP SERPL CALCULATED.3IONS-SCNC: 6 MMOL/L (ref 3–14)
AST SERPL W P-5'-P-CCNC: 25 U/L (ref 0–45)
BILIRUB SERPL-MCNC: 0.7 MG/DL (ref 0.2–1.3)
BUN SERPL-MCNC: 17 MG/DL (ref 7–30)
CALCIUM SERPL-MCNC: 8.7 MG/DL (ref 8.5–10.1)
CHLORIDE SERPL-SCNC: 106 MMOL/L (ref 94–109)
CHOLEST SERPL-MCNC: 175 MG/DL
CO2 SERPL-SCNC: 28 MMOL/L (ref 20–32)
CREAT SERPL-MCNC: 0.9 MG/DL (ref 0.66–1.25)
ERYTHROCYTE [DISTWIDTH] IN BLOOD BY AUTOMATED COUNT: 12.8 % (ref 10–15)
GFR SERPL CREATININE-BSD FRML MDRD: 84 ML/MIN/1.7M2
GLUCOSE SERPL-MCNC: 93 MG/DL (ref 70–99)
HCT VFR BLD AUTO: 43.5 % (ref 40–53)
HDLC SERPL-MCNC: 81 MG/DL
HGB BLD-MCNC: 15.1 G/DL (ref 13.3–17.7)
LDLC SERPL CALC-MCNC: 76 MG/DL
MCH RBC QN AUTO: 33.7 PG (ref 26.5–33)
MCHC RBC AUTO-ENTMCNC: 34.7 G/DL (ref 31.5–36.5)
MCV RBC AUTO: 97 FL (ref 78–100)
NONHDLC SERPL-MCNC: 94 MG/DL
PLATELET # BLD AUTO: 174 10E9/L (ref 150–450)
POTASSIUM SERPL-SCNC: 4.1 MMOL/L (ref 3.4–5.3)
PROT SERPL-MCNC: 6.8 G/DL (ref 6.8–8.8)
RBC # BLD AUTO: 4.48 10E12/L (ref 4.4–5.9)
SODIUM SERPL-SCNC: 140 MMOL/L (ref 133–144)
TRIGL SERPL-MCNC: 89 MG/DL
WBC # BLD AUTO: 4 10E9/L (ref 4–11)

## 2017-11-09 PROCEDURE — G0008 ADMIN INFLUENZA VIRUS VAC: HCPCS | Performed by: PHYSICIAN ASSISTANT

## 2017-11-09 PROCEDURE — 85027 COMPLETE CBC AUTOMATED: CPT | Performed by: PHYSICIAN ASSISTANT

## 2017-11-09 PROCEDURE — 80061 LIPID PANEL: CPT | Performed by: PHYSICIAN ASSISTANT

## 2017-11-09 PROCEDURE — G0439 PPPS, SUBSEQ VISIT: HCPCS | Performed by: PHYSICIAN ASSISTANT

## 2017-11-09 PROCEDURE — 36415 COLL VENOUS BLD VENIPUNCTURE: CPT | Performed by: PHYSICIAN ASSISTANT

## 2017-11-09 PROCEDURE — 80053 COMPREHEN METABOLIC PANEL: CPT | Performed by: PHYSICIAN ASSISTANT

## 2017-11-09 PROCEDURE — 90662 IIV NO PRSV INCREASED AG IM: CPT | Performed by: PHYSICIAN ASSISTANT

## 2017-11-09 RX ORDER — SILDENAFIL 100 MG/1
100 TABLET, FILM COATED ORAL DAILY PRN
Qty: 8 TABLET | Refills: 11 | Status: SHIPPED | OUTPATIENT
Start: 2017-11-09 | End: 2018-11-16

## 2017-11-09 ASSESSMENT — ANXIETY QUESTIONNAIRES
5. BEING SO RESTLESS THAT IT IS HARD TO SIT STILL: NOT AT ALL
GAD7 TOTAL SCORE: 0
4. TROUBLE RELAXING: NOT AT ALL
2. NOT BEING ABLE TO STOP OR CONTROL WORRYING: NOT AT ALL
GAD7 TOTAL SCORE: 0
GAD7 TOTAL SCORE: 0
7. FEELING AFRAID AS IF SOMETHING AWFUL MIGHT HAPPEN: NOT AT ALL
6. BECOMING EASILY ANNOYED OR IRRITABLE: NOT AT ALL
3. WORRYING TOO MUCH ABOUT DIFFERENT THINGS: NOT AT ALL
7. FEELING AFRAID AS IF SOMETHING AWFUL MIGHT HAPPEN: NOT AT ALL
1. FEELING NERVOUS, ANXIOUS, OR ON EDGE: NOT AT ALL

## 2017-11-09 ASSESSMENT — PATIENT HEALTH QUESTIONNAIRE - PHQ9
SUM OF ALL RESPONSES TO PHQ QUESTIONS 1-9: 0
SUM OF ALL RESPONSES TO PHQ QUESTIONS 1-9: 0
10. IF YOU CHECKED OFF ANY PROBLEMS, HOW DIFFICULT HAVE THESE PROBLEMS MADE IT FOR YOU TO DO YOUR WORK, TAKE CARE OF THINGS AT HOME, OR GET ALONG WITH OTHER PEOPLE: NOT DIFFICULT AT ALL

## 2017-11-09 ASSESSMENT — PAIN SCALES - GENERAL: PAINLEVEL: NO PAIN (0)

## 2017-11-09 NOTE — PROGRESS NOTES
Prior to injection verified patient identity using patient's name and date of birth.    Injectable Influenza Immunization Documentation    1.  Is the person to be vaccinated sick today?   No    2. Does the person to be vaccinated have an allergy to a component   of the vaccine?   No  Egg Allergy Algorithm Link    3. Has the person to be vaccinated ever had a serious reaction   to influenza vaccine in the past?   No    4. Has the person to be vaccinated ever had Guillain-Barré syndrome?   No    Form completed by Sheri Oliveira CMA ([facility])  Per orders of Nicola Banerjee, injection of Flu given by Sheri Oliveira CMA. Patient instructed to remain in clinic for 15 minutes afterwards, and to report any adverse reaction to me immediately.

## 2017-11-09 NOTE — NURSING NOTE
"Chief Complaint   Patient presents with     Physical     Panel Management     MyChart, Flu, Fall Risk, Honoring choices       Initial /80 (Cuff Size: Adult Regular)  Pulse 52  Temp 97.7  F (36.5  C) (Temporal)  Resp 18  Ht 5' 3.5\" (1.613 m)  Wt 169 lb 9.6 oz (76.9 kg)  BMI 29.57 kg/m2 Estimated body mass index is 29.57 kg/(m^2) as calculated from the following:    Height as of this encounter: 5' 3.5\" (1.613 m).    Weight as of this encounter: 169 lb 9.6 oz (76.9 kg).  Medication Reconciliation: complete   Sheri Oliveira CMA ([facility])    "

## 2017-11-09 NOTE — LETTER
November 9, 2017      Kameron Whitley  [address] [place] [address]        Dear ,    We are writing to inform you of your test results.    Your test results fall within the expected range(s) or remain unchanged from previous results.  Please continue with current treatment plan.    Resulted Orders   CBC with platelets   Result Value Ref Range    WBC 4.0 4.0 - 11.0 10e9/L    RBC Count 4.48 4.4 - 5.9 10e12/L    Hemoglobin 15.1 13.3 - 17.7 g/dL    Hematocrit 43.5 40.0 - 53.0 %    MCV 97 78 - 100 fl    MCH 33.7 (H) 26.5 - 33.0 pg    MCHC 34.7 31.5 - 36.5 g/dL    RDW 12.8 10.0 - 15.0 %    Platelet Count 174 150 - 450 10e9/L   Comprehensive metabolic panel   Result Value Ref Range    Sodium 140 133 - 144 mmol/L    Potassium 4.1 3.4 - 5.3 mmol/L    Chloride 106 94 - 109 mmol/L    Carbon Dioxide 28 20 - 32 mmol/L    Anion Gap 6 3 - 14 mmol/L    Glucose 93 70 - 99 mg/dL    Urea Nitrogen 17 7 - 30 mg/dL    Creatinine 0.90 0.66 - 1.25 mg/dL    GFR Estimate 84 >60 mL/min/1.7m2      Comment:      Non  GFR Calc    GFR Estimate If Black >90 >60 mL/min/1.7m2      Comment:       GFR Calc    Calcium 8.7 8.5 - 10.1 mg/dL    Bilirubin Total 0.7 0.2 - 1.3 mg/dL    Albumin 3.5 3.4 - 5.0 g/dL    Protein Total 6.8 6.8 - 8.8 g/dL    Alkaline Phosphatase 65 40 - 150 U/L    ALT 30 0 - 70 U/L    AST 25 0 - 45 U/L   Lipid panel reflex to direct LDL Fasting   Result Value Ref Range    Cholesterol 175 <200 mg/dL    Triglycerides 89 <150 mg/dL    HDL Cholesterol 81 >39 mg/dL    LDL Cholesterol Calculated 76 <100 mg/dL      Comment:      Desirable:       <100 mg/dl    Non HDL Cholesterol 94 <130 mg/dL       If you have any questions or concerns, please call the clinic at the number listed above.       Sincerely,        Nicola Banerjee PA-C

## 2017-11-10 ASSESSMENT — PATIENT HEALTH QUESTIONNAIRE - PHQ9: SUM OF ALL RESPONSES TO PHQ QUESTIONS 1-9: 0

## 2017-11-10 ASSESSMENT — ANXIETY QUESTIONNAIRES: GAD7 TOTAL SCORE: 0

## 2017-11-13 ENCOUNTER — OFFICE VISIT (OUTPATIENT)
Dept: ORTHOPEDICS | Facility: CLINIC | Age: 67
End: 2017-11-13
Attending: PHYSICIAN ASSISTANT
Payer: COMMERCIAL

## 2017-11-13 ENCOUNTER — RADIANT APPOINTMENT (OUTPATIENT)
Dept: GENERAL RADIOLOGY | Facility: CLINIC | Age: 67
End: 2017-11-13
Attending: ORTHOPAEDIC SURGERY
Payer: COMMERCIAL

## 2017-11-13 VITALS — WEIGHT: 171 LBS | BODY MASS INDEX: 29.19 KG/M2 | HEIGHT: 64 IN | TEMPERATURE: 96.6 F

## 2017-11-13 DIAGNOSIS — M19.90 ARTHRITIS: ICD-10-CM

## 2017-11-13 DIAGNOSIS — M19.90 ARTHRITIS: Primary | ICD-10-CM

## 2017-11-13 PROCEDURE — 99203 OFFICE O/P NEW LOW 30 MIN: CPT | Performed by: ORTHOPAEDIC SURGERY

## 2017-11-13 PROCEDURE — 73130 X-RAY EXAM OF HAND: CPT | Mod: TC

## 2017-11-13 ASSESSMENT — PAIN SCALES - GENERAL: PAINLEVEL: NO PAIN (0)

## 2017-11-13 NOTE — PATIENT INSTRUCTIONS
What Is Osteoarthritis?  There are about 100 different types of arthritis. In general, arthritis means problems with the joints. A joint is a point in the body where two or more bones come together. Arthritis may also cause problems in the tissue near the joints, including muscles, tendons, and ligaments. And, in some types of arthritis, the entire body can be affected.  Osteoarthritis (OA) is sometimes called degenerative joint disease, or wear-and-tear arthritis. It's the most common type of arthritis. In OA, the cartilage wears away. Cartilage is a slick tissue that covers the ends of the bones. It acts as a cushion and allows them to glide smoothly against each other. When the cartilage wears away, bone rubs against bone. This causes pain, swelling, stiffness, and difficulty moving. Risk factors for developing OA include obesity, being older than 40, past joint trauma, repetitive joint use, and a family history of OA.      Normal knee Knee with arthritis   Symptoms  OA can affect any joint. Weight-bearing joints, such as the hips and knees, are often affected. Common symptoms are joint pain and stiffness. Pain and stiffness may get worse with periods of inactivity or overuse. For example, you may have more stiffness first thing in the morning, usually for less than 30 minutes. Or you may have stiffness after sitting for a long period of time. This might be while sitting at a movie. You may also have more pain in your hips or knees if you walk farther than you usually do.  Other common symptoms are:    Weak muscles    Unstable or wobbly joints    Grinding or crackling noises with motion    Joints with swelling or bumps    Loss of range of motion, or the ability to bend and straighten them  If you have any of these joint changes, make an appointment to see your healthcare provider. The two of you can work together to create a treatment plan that may help lessen your pain and stiffness and prevent symptoms from  getting worse.  Date Last Reviewed: 2/14/2016 2000-2017 [employer]. [address] [place] [address]. All rights reserved. This information is not intended as a substitute for professional medical care. Always follow your healthcare professional's instructions.        Osteoarthritis: Common Sites  Osteoarthritis (OA) is sometimes called degenerative joint disease or wear-and-tear arthritis. It's the most common type of arthritis. In OA, the cartilage wears away. Cartilage covers the ends of bones and acts as a cushion. If enough cartilage wears away, bone rubs against bone. The joint changes in OA cause pain, stiffness, and trouble moving. OA may occur in any joint. Some joints that are commonly affected are the spine, neck,  hips, knees, fingers, and toes.     Neck  Joints between small bones in the neck may wear out. Pain may travel to the shoulder or the base of the skull.  Lumbar Spine  Bony spurs may form on the joints between the vertebrae (spinal bones). And disks (cushions of cartilage between vertebrae) may wear down. Pain may affect the lower back or leg.  Hip  Cartilage damage can occur in the large  ball and socket  joint that connects the pelvis and thighbone (femur). Pain may travel to the groin, buttocks, or knee.  Knee  The cartilage in the knee joint may wear down. Weakness or instability in the knee joint may make walking or climbing stairs difficult.  Fingers  Finger joints may become enlarged and knobby. Grasping objects may be hard, especially if the joint at the base of the thumb is affected.  Toes  Toes may be affected. Arthritis may cause a bunion, a bump at the base of the big toe. Standing or walking may be painful.  Date Last Reviewed: 2/14/2016 2000-2017 [employer]. [address] [place] [address]. All rights reserved. This information is not intended as a substitute for professional medical care. Always follow your healthcare  professional's instructions.

## 2017-11-13 NOTE — PROGRESS NOTES
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      He is being seen for   Chief Complaints and History of Present Illnesses   Patient presents with     Consult     rt hand finger deformity per Nicola Andrews         History of Present Illness:   Kameron Whitley is a 67 year old male who is seen in consultation at the request of Nicola Andrews.  History of Present illness:  Kameron presents for evaluation of:   1.) rt index  finger  Onset:  10+ yrs    Symptoms brought on by nothing.   Character:  dull ache.    Progression of symptoms:  worse.    Previous similar pain: no .   Pain Level:  0/10.   Previous treatments:  nothing.  Currently on Blood thinners? Asa 81  Diagnosis of Diabetes? no  Difficulty grasping, writing      Patient's past medical, surgical, social and family histories reviewed.     Past Medical History:   Diagnosis Date     Closed fracture of clavicle     Age 2 - Right Clavicle       Past Surgical History:   Procedure Laterality Date     CARPAL TUNNEL RELEASE RT/LT  1/2006    right hand     COLONOSCOPY  4/2009    Normal-f/u 10 years (At [facility])     LAPAROSCOPIC CHOLECYSTECTOMY N/A 10/5/2016    Procedure: LAPAROSCOPIC CHOLECYSTECTOMY;  Surgeon: Nikita Anderson MD;  Location: [facility]     LAPAROSCOPIC HERNIORRHAPHY INGUINAL BILATERAL  1/10/2014    Procedure: LAPAROSCOPIC HERNIORRHAPHY INGUINAL BILATERAL;  Laparoscopic Bilateral Inguinal Hernia Repair;  Surgeon: Nikita Anderson MD;  Location: [facility] OR       Medications:    Current Outpatient Prescriptions on File Prior to Visit:  sildenafil (VIAGRA) 100 MG tablet Take 1 tablet (100 mg) by mouth daily as needed Take 30 min to 4 hours before intercourse.  Never use with nitroglycerin, terazosin or doxazosin.   ketorolac (ACULAR) 0.5 % ophthalmic solution    ofloxacin (OCUFLOX) 0.3 % ophthalmic solution    prednisoLONE acetate (PRED FORTE) 1 % ophthalmic susp    omeprazole  "(PRILOSEC) 40 MG capsule Take 1 capsule (40 mg) by mouth daily Take 30-60 minutes before a meal. (Patient not taking: Reported on 5/3/2017)   LOW-DOSE ASPIRIN PO Take  by mouth.     No current facility-administered medications on file prior to visit.     No Known Allergies    Social History     Occupational History     Not on file.     Social History Main Topics     Smoking status: Former Smoker     Types: Cigarettes     Smokeless tobacco: Never Used     Alcohol use 0.0 oz/week     0 Standard drinks or equivalent per week      Comment: occ     Drug use: No     Sexual activity: Yes     Partners: Female       Family History   Problem Relation Age of Onset     DIABETES Mother      Arthritis Mother      DIABETES Brother      DIABETES Brother        REVIEW OF SYSTEMS  10 point review systems performed otherwise negative as noted as per history of present illness.    Physical Exam:  Vitals: Temp 96.6  F (35.9  C)  Ht 1.613 m (5' 3.5\")  Wt 77.6 kg (171 lb)  BMI 29.82 kg/m2  BMI= Body mass index is 29.82 kg/(m^2).    Constitutional: healthy, alert and no acute distress   Psychiatric: mentation appears normal and affect normal/bright  NEURO: no focal deficits  RESP: Normal with easy respirations and no use of accessory muscles to breathe, no audible wheezing or retractions  CV: regular pulse  SKIN: No erythema, rashes, excoriation, or breakdown. No evidence of infection.   JOINT/EXTREMITIES:right Hand/Finger Exam: Inspection:Index Finger:  moderate swelling, PIPJ due to DJD  Tender: All Normal  Non-tender: All Normal  Range of Motion Index Finger:   Passively able to correct some of the flexion and ulnar deviation of digit, some overlapping           GAIT: non-antalgic  Lymph: no palpable lymph nodes    Diagnostic Modalities:  right hand X-ray: DJD throughout, worst at IF PIPJ  Independent visualization of the images was performed.      Impression: right hand index finger osteoarthritis, ?RA    Plan:  All of the above " pertinent physical exam and imaging modalities findings was reviewed with Kameron.                                          CONSERVATIVE CARE:  I recommend conservative care for the patient to include NSAIDs, Tylenol, OT. Today I provided or dispensed occupational therapy, info.                                                FUTURE PLAN:  On their return if they still have symptoms we will consider referral to hand surgeon.      Return to clinic PRN, or sooner as needed for changes.  Re-x-ray on return: No    Michelle Alegria M.D.

## 2017-11-13 NOTE — NURSING NOTE
"Chief Complaint   Patient presents with     Consult     rt hand finger deformity per        Initial Temp 96.6  F (35.9  C)  Ht 1.613 m (5' 3.5\")  Wt 77.6 kg (171 lb)  BMI 29.82 kg/m2 Estimated body mass index is 29.82 kg/(m^2) as calculated from the following:    Height as of this encounter: 1.613 m (5' 3.5\").    Weight as of this encounter: 77.6 kg (171 lb).  Medication Reconciliation: complete    BP completed using cuff size: NA (Not Taken)    Kayy Adams MA      "

## 2017-11-13 NOTE — LETTER
11/13/2017         RE: Kameron Whitley  [address] [place] [address]        Dear Colleague,    Thank you for referring your patient, Kameron Whitley, to the [facility]. Please see a copy of my visit note below.    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      He is being seen for   Chief Complaints and History of Present Illnesses   Patient presents with     Consult     rt hand finger deformity per Nicola Andrews         History of Present Illness:   Kameron Whitley is a 67 year old male who is seen in consultation at the request of Nicola Andrews.  History of Present illness:  Kameron presents for evaluation of:   1.) rt index  finger  Onset:  10+ yrs    Symptoms brought on by nothing.   Character:  dull ache.    Progression of symptoms:  worse.    Previous similar pain: no .   Pain Level:  0/10.   Previous treatments:  nothing.  Currently on Blood thinners? Asa 81  Diagnosis of Diabetes? no  Difficulty grasping, writing      Patient's past medical, surgical, social and family histories reviewed.     Past Medical History:   Diagnosis Date     Closed fracture of clavicle     Age 2 - Right Clavicle       Past Surgical History:   Procedure Laterality Date     CARPAL TUNNEL RELEASE RT/LT  1/2006    right hand     COLONOSCOPY  4/2009    Normal-f/u 10 years (At [facility])     LAPAROSCOPIC CHOLECYSTECTOMY N/A 10/5/2016    Procedure: LAPAROSCOPIC CHOLECYSTECTOMY;  Surgeon: Nikita Anderson MD;  Location:  OR     LAPAROSCOPIC HERNIORRHAPHY INGUINAL BILATERAL  1/10/2014    Procedure: LAPAROSCOPIC HERNIORRHAPHY INGUINAL BILATERAL;  Laparoscopic Bilateral Inguinal Hernia Repair;  Surgeon: Nikita Anderson MD;  Location: [facility]       Medications:    Current Outpatient Prescriptions on File Prior to Visit:  sildenafil (VIAGRA) 100 MG tablet Take 1 tablet (100 mg) by mouth  "daily as needed Take 30 min to 4 hours before intercourse.  Never use with nitroglycerin, terazosin or doxazosin.   ketorolac (ACULAR) 0.5 % ophthalmic solution    ofloxacin (OCUFLOX) 0.3 % ophthalmic solution    prednisoLONE acetate (PRED FORTE) 1 % ophthalmic susp    omeprazole (PRILOSEC) 40 MG capsule Take 1 capsule (40 mg) by mouth daily Take 30-60 minutes before a meal. (Patient not taking: Reported on 5/3/2017)   LOW-DOSE ASPIRIN PO Take  by mouth.     No current facility-administered medications on file prior to visit.     No Known Allergies    Social History     Occupational History     Not on file.     Social History Main Topics     Smoking status: Former Smoker     Types: Cigarettes     Smokeless tobacco: Never Used     Alcohol use 0.0 oz/week     0 Standard drinks or equivalent per week      Comment: occ     Drug use: No     Sexual activity: Yes     Partners: Female       Family History   Problem Relation Age of Onset     DIABETES Mother      Arthritis Mother      DIABETES Brother      DIABETES Brother        REVIEW OF SYSTEMS  10 point review systems performed otherwise negative as noted as per history of present illness.    Physical Exam:  Vitals: Temp 96.6  F (35.9  C)  Ht 1.613 m (5' 3.5\")  Wt 77.6 kg (171 lb)  BMI 29.82 kg/m2  BMI= Body mass index is 29.82 kg/(m^2).    Constitutional: healthy, alert and no acute distress   Psychiatric: mentation appears normal and affect normal/bright  NEURO: no focal deficits  RESP: Normal with easy respirations and no use of accessory muscles to breathe, no audible wheezing or retractions  CV: regular pulse  SKIN: No erythema, rashes, excoriation, or breakdown. No evidence of infection.   JOINT/EXTREMITIES:right Hand/Finger Exam: Inspection:Index Finger:  moderate swelling, PIPJ due to DJD  Tender: All Normal  Non-tender: All Normal  Range of Motion Index Finger:   Passively able to correct some of the flexion and ulnar deviation of digit, some overlapping "           GAIT: non-antalgic  Lymph: no palpable lymph nodes    Diagnostic Modalities:  right hand X-ray: DJD throughout, worst at IF PIPJ  Independent visualization of the images was performed.      Impression: right hand index finger osteoarthritis, ?RA    Plan:  All of the above pertinent physical exam and imaging modalities findings was reviewed with Kameron.                                          CONSERVATIVE CARE:  I recommend conservative care for the patient to include NSAIDs, Tylenol, OT. Today I provided or dispensed occupational therapy, info.                                                FUTURE PLAN:  On their return if they still have symptoms we will consider referral to hand surgeon.      Return to clinic PRN, or sooner as needed for changes.  Re-x-ray on return: No    Michelle Alegria M.D.    Again, thank you for allowing me to participate in the care of your patient.        Sincerely,        Michelle Alegria MD

## 2017-11-13 NOTE — MR AVS SNAPSHOT
After Visit Summary   11/13/2017    Kameron Whitley    MRN: [id number]           Patient Information     Date Of Birth          1950        Visit Information        Provider Department      11/13/2017 8:40 AM Michelle Alegria MD [facility]        Today's Diagnoses     Arthritis    -  1      Care Instructions      What Is Osteoarthritis?  There are about 100 different types of arthritis. In general, arthritis means problems with the joints. A joint is a point in the body where two or more bones come together. Arthritis may also cause problems in the tissue near the joints, including muscles, tendons, and ligaments. And, in some types of arthritis, the entire body can be affected.  Osteoarthritis (OA) is sometimes called degenerative joint disease, or wear-and-tear arthritis. It's the most common type of arthritis. In OA, the cartilage wears away. Cartilage is a slick tissue that covers the ends of the bones. It acts as a cushion and allows them to glide smoothly against each other. When the cartilage wears away, bone rubs against bone. This causes pain, swelling, stiffness, and difficulty moving. Risk factors for developing OA include obesity, being older than 40, past joint trauma, repetitive joint use, and a family history of OA.      Normal knee Knee with arthritis   Symptoms  OA can affect any joint. Weight-bearing joints, such as the hips and knees, are often affected. Common symptoms are joint pain and stiffness. Pain and stiffness may get worse with periods of inactivity or overuse. For example, you may have more stiffness first thing in the morning, usually for less than 30 minutes. Or you may have stiffness after sitting for a long period of time. This might be while sitting at a movie. You may also have more pain in your hips or knees if you walk farther than you usually do.  Other common symptoms are:    Weak muscles    Unstable or wobbly joints    Grinding or  crackling noises with motion    Joints with swelling or bumps    Loss of range of motion, or the ability to bend and straighten them  If you have any of these joint changes, make an appointment to see your healthcare provider. The two of you can work together to create a treatment plan that may help lessen your pain and stiffness and prevent symptoms from getting worse.  Date Last Reviewed: 2/14/2016 2000-2017 [employer]. [address] [place] [address], [place] [address]. All rights reserved. This information is not intended as a substitute for professional medical care. Always follow your healthcare professional's instructions.        Osteoarthritis: Common Sites  Osteoarthritis (OA) is sometimes called degenerative joint disease or wear-and-tear arthritis. It's the most common type of arthritis. In OA, the cartilage wears away. Cartilage covers the ends of bones and acts as a cushion. If enough cartilage wears away, bone rubs against bone. The joint changes in OA cause pain, stiffness, and trouble moving. OA may occur in any joint. Some joints that are commonly affected are the spine, neck,  hips, knees, fingers, and toes.     Neck  Joints between small bones in the neck may wear out. Pain may travel to the shoulder or the base of the skull.  Lumbar Spine  Bony spurs may form on the joints between the vertebrae (spinal bones). And disks (cushions of cartilage between vertebrae) may wear down. Pain may affect the lower back or leg.  Hip  Cartilage damage can occur in the large  ball and socket  joint that connects the pelvis and thighbone (femur). Pain may travel to the groin, buttocks, or knee.  Knee  The cartilage in the knee joint may wear down. Weakness or instability in the knee joint may make walking or climbing stairs difficult.  Fingers  Finger joints may become enlarged and knobby. Grasping objects may be hard, especially if the joint at the base of the thumb is affected.  Toes  Toes may be  "affected. Arthritis may cause a bunion, a bump at the base of the big toe. Standing or walking may be painful.  Date Last Reviewed: 2/14/2016 2000-2017 [employer]. [address] [place] [address], [place] [address]. All rights reserved. This information is not intended as a substitute for professional medical care. Always follow your healthcare professional's instructions.                Follow-ups after your visit        Who to contact     If you have questions or need follow up information about today's clinic visit or your schedule please contact [facility] directly at [phone].  Normal or non-critical lab and imaging results will be communicated to you by [employer], letter or phone within 4 business days after the clinic has received the results. If you do not hear from us within 7 days, please contact the clinic through 19[employer] or phone. If you have a critical or abnormal lab result, we will notify you by phone as soon as possible.  Submit refill requests through [employer] or call your pharmacy and they will forward the refill request to us. Please allow 3 business days for your refill to be completed.          Additional Information About Your Visit        MyChart Information     [employer] gives you secure access to your electronic health record. If you see a primary care provider, you can also send messages to your care team and make appointments. If you have questions, please call your primary care clinic.  If you do not have a primary care provider, please call [phone] and they will assist you.        Care EveryWhere ID     This is your Care EveryWhere ID. This could be used by other organizations to access your [place] medical records  [id number]        Your Vitals Were     Temperature Height BMI (Body Mass Index)             96.6  F (35.9  C) 1.613 m (5' 3.5\") 29.82 kg/m2          Blood Pressure from Last 3 Encounters:   11/09/17 132/80   05/03/17 116/68   03/24/17 110/60    " Weight from Last 3 Encounters:   11/13/17 77.6 kg (171 lb)   11/09/17 76.9 kg (169 lb 9.6 oz)   05/03/17 75.8 kg (167 lb 3.2 oz)               Primary Care Provider Office Phone # Fax Chiara    Nicola Andrews PA-C [phone]       [address] [facility] [address]        Equal Access to Services     MESFIN GARCIA : Hadii aad ku hadasho Soomaali, waaxda luqadaha, qaybta kaalmada adeegyada, waxay idiin hayaan adeeg dexter laMohinialejandro . So [facility] [phone].    ATENCIÓN: Si habla jacob, tiene a kaufman disposición servicios gratuitos de asistencia lingüística. Llame al [phone].    We comply with applicable federal civil rights laws and [place] laws. We do not discriminate on the basis of race, color, national origin, age, disability, sex, sexual orientation, or gender identity.            Thank you!     Thank you for choosing [facility]  for your care. Our goal is always to provide you with excellent care. Hearing back from our patients is one way we can continue to improve our services. Please take a few minutes to complete the written survey that you may receive in the mail after your visit with us. Thank you!             Your Updated Medication List - Protect others around you: Learn how to safely use, store and throw away your medicines at www.disposemymeds.org.          This list is accurate as of: 11/13/17  9:09 AM.  Always use your most recent med list.                   Brand Name Dispense Instructions for use Diagnosis    ketorolac 0.5 % ophthalmic solution    ACULAR          LOW-DOSE ASPIRIN PO      Take  by mouth.        ofloxacin 0.3 % ophthalmic solution    OCUFLOX          omeprazole 40 MG capsule    priLOSEC    30 capsule    Take 1 capsule (40 mg) by mouth daily Take 30-60 minutes before a meal.    Abdominal pain, generalized, Abdominal pain, epigastric       prednisoLONE acetate 1 % ophthalmic susp    PRED FORTE          sildenafil 100 MG tablet    VIAGRA    8 tablet    Take 1  tablet (100 mg) by mouth daily as needed Take 30 min to 4 hours before intercourse.  Never use with nitroglycerin, terazosin or doxazosin.    Erectile dysfunction due to diseases classified elsewhere

## 2017-12-06 ENCOUNTER — HOSPITAL ENCOUNTER (OUTPATIENT)
Dept: OCCUPATIONAL THERAPY | Facility: CLINIC | Age: 67
Setting detail: THERAPIES SERIES
End: 2017-12-06
Attending: ORTHOPAEDIC SURGERY
Payer: MEDICARE

## 2017-12-06 PROCEDURE — 97018 PARAFFIN BATH THERAPY: CPT | Mod: GO

## 2017-12-06 PROCEDURE — 40000839 ZZH STATISTIC HAND THERAPY VISIT

## 2017-12-06 PROCEDURE — G8988 SELF CARE GOAL STATUS: HCPCS | Mod: GO,CH

## 2017-12-06 PROCEDURE — 97110 THERAPEUTIC EXERCISES: CPT | Mod: GO

## 2017-12-06 PROCEDURE — 97165 OT EVAL LOW COMPLEX 30 MIN: CPT | Mod: GO

## 2017-12-06 PROCEDURE — G8987 SELF CARE CURRENT STATUS: HCPCS | Mod: GO,CI

## 2017-12-07 NOTE — PROGRESS NOTES
12/06/17 1104   Quick Adds   Quick Adds Certification;Fall Risk Screen   Therapy Certification   Certification date from 12/06/17   Certification date to 02/02/18   Medical Diagnosis Arthritis (M10.90)   Certification I certify the need for these services furnished under this plan of treatment and while under my care.  (Physician co-signature of this document indicates review and certification of the therapy plan).   General Information/History   Start Of Care Date 12/06/17   Referring Physician Dr. Michelle Alegria   Orders Evaluate And Treat As Indicated   Orders Date 11/13/17   Medical Diagnosis Arthritis (M19.90)   Additional Occupational Profile Info/Pertinent history of current problem Patient is a 67 year old male who presents to therapy with deformed index finger and minimal use that finger for gripping/pinching.  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  He does not work every week and when he does is can be up to 80 hours   Previous treatment or current condition Patient reports that he tries to bend finger and use it more thinking that would help keep the finger limber.  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  He has since developed arthritis and is not able to move PIP.     Past medical history Within the past year has had cataract surgery.    How/Where did it occur Other   Occurence comment Developed from overuse.     Onset date of current episode/exacerbation 11/13/17  (Started seeking for assistance this date. 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    Fall Risk Screen   Fall screen completed by OT   Have you fallen 2 or more times in the past year? No   Have you fallen and had an injury in the past year? No   Is patient a fall risk? No   Pain   Pain Primary Pain Report   Primary Pain Report   Location Index finger on Right hand   Radiation Does Not Radiate   Pain Quality Throbbing;Dull   Frequency Intermittent   Scale 3/10;4/10   Pain Is Worse In The P.m.   Pain Is Exacerbated By Pinching;Other;Gripping  (\"just trying to bend it\")   Pain Is Relieved By Rest   Progression Since Onset Gradually Worsening   Edema   Edema Normal   Skin Appearance   Skin Appearance Other (Comment)   Skin Appearance Comments Clean, dry, intact, no color change    Tenderness   Tenderness Index   Comment Medial side of PIP joint   Palpation   Palpation Normal   ROM   ROM AROM;PROM   AROM   AROM Index   Index   MCP Extension - Right 0   MCP Flexion - Right 90   PIP Extension - Right 0   PIP Flexion - Right 27  (support under MCP joint/proximal phalanx)   DIP Extension - Right 19   DIP Flexion - Right 61   Strength   Strength Strength    Avg - Left 69    Avg - Right 68  (was not able to wrap index finger around)   Lateral Pinch - Left 23   Lateral Pinch - Right 16   3 Point Pinch - Left 22   3 Point Pinch - Right 14.5   Education Assessment   Preferred Learning Style Other  (Patient reported \"any\")   Therapy Interventions   Planned Therapy Interventions Ultrasound;Strengthening;ROM;Stretching;Manual Therapy;Self Care/Home Management;Joint Protection Instruction;Paraffin   Clinical Impression   Criteria for Skilled Therapeutic Interventions Met " yes;treatment indicated   OT Diagnosis Decreased ability to complete household and work tasks that require gripping/pinching of Right hand.  Patient does have difficulty with writing, opening jars, holding onto power tools, swiping finger on tablet for leisure and work, Preparing food, opening doors, and throwing a ball.     Influenced by the following impairments Decreased strength;Decreased range of motion;Pain   Assessment of Occupational Performance 1-3 Performance Deficits   Identified Performance Deficits Leisure, meal preparation, work task   Clinical Decision Making (Complexity) Low complexity   Therapy Frequency 1x/week    Predicted Duration of Therapy Intervention (days/wks) 8 weeks   Risks and Benefits of Treatment have been explained. Yes   Patient, Family & other staff in agreement with plan of care Yes   Hand Goals   Hand Goals Household Chores;Eating;Work   Eating   Current Functional Task Cutting food;Gripping;Holding   Previous Performance Level Independent   Current Performance Level Moderate difficulty   Goal Target Task  knife to cut;Cut food;Hold utensils to eat   Goal Target Performance Level No difficulty   Due Date 02/02/18   Household Chores   Current Functional Task Washing and drying dishes;Vacuuming;Gripping;Turning;Sweeping   Previous Performance Level Independent   Current Performance Level Moderate difficulty   Goal Target Task Hold dish rag and wash dishes;Hold and use vacuum ;Sweep floors;Open a tight or new jar; and turn to open a door   Goal Target Performance Level Mild difficulty   Due Date 02/02/18   Work   Current Functional Task Repetitive tasks;Manipulating tools;Writing   Previous Performance Level Independent   Current Performance Level Mild difficulty   Goal Target Task Hold and manipulate tools;Complete repetitive tasks;Write legibly   Goal Target Performance Level No difficulty   Due Date 02/02/18   Total Evaluation Time   Total Evaluation Time (Minutes)  35     ELIANE Eduardo/L  [facility]  [phone]

## 2017-12-07 NOTE — PROGRESS NOTES
[facility]      OUTPATIENT OCCUPATIONAL THERAPY HAND EVALUATION  PLAN OF TREATMENT FOR OUTPATIENT REHABILITATION  (COMPLETE FOR INITIAL CLAIMS ONLY)  Patient's Last Name, First Name, M.I.  YOB: 1950  Kameron Whitley                        Provider s Name: [facility] Medical Record No.  [id number]     Onset Date: 11/13/17 (Started seeking for assistance this date. )    Start of Care Date: 12/06/17   Type:     ___PT  _X_OT   ___SLP    Medical Diagnosis: Arthritis (M10.90)   Occupational Therapy Diagnosis:  Decreased ability to complete household and work tasks that require gripping/pinching of Right hand.  Patient does have difficulty with writing, opening jars, holding onto power tools, swiping finger on tablet for leisure and work, Preparing food, opening doors, and throwing a ball.      Visits from SOC: 1      _________________________________________________________________________________  Plan of Treatment/Functional Goals:  Planned Therapy Interventions:  Ultrasound, Strengthening, ROM, Stretching, Manual Therapy, Self Care/Home Management, Joint Protection Instruction, Paraffin     Goals  1.   Current Performance Level: Moderate difficulty       Goal Target Task:  knife to cut, Cut food, Hold utensils to eat       Goal Target Performance Level: No difficulty           2. Goal Target Task: Hold dish rag and wash dishes, Hold and use vacuum , Sweep floors, Open a tight or new jar,  and turn to open a door       Goal Target Performance Level: Mild difficulty       Due Date: 02/02/18   3. Goal Target Task: Hold and manipulate tools, Complete repetitive tasks, Write legibly       Goal Target Performance Level: No difficulty       Due Date: 02/02/18            Treatment Frequency: Therapy Frequency: 1x/week   Predicated Duration of Therapy Intervention:  Predicted  Duration of Therapy Intervention (days/wks): 8 weeks    Daneille Jay OT         I CERTIFY THE NEED FOR THESE SERVICES FURNISHED UNDER        THIS PLAN OF TREATMENT AND WHILE UNDER MY CARE     (Physician co-signature of this document indicates review and certification of the therapy plan).                  Certification Period:  12/06/17 to 02/02/18            Referring Physician:  Dr. Michelle Alegria    Initial Assessment        See Epic Evaluation Start of Care Date: 12/06/17

## 2017-12-19 ENCOUNTER — HOSPITAL ENCOUNTER (OUTPATIENT)
Dept: OCCUPATIONAL THERAPY | Facility: CLINIC | Age: 67
Setting detail: THERAPIES SERIES
End: 2017-12-19
Attending: ORTHOPAEDIC SURGERY
Payer: MEDICARE

## 2017-12-19 PROCEDURE — 40000839 ZZH STATISTIC HAND THERAPY VISIT

## 2017-12-19 PROCEDURE — 97535 SELF CARE MNGMENT TRAINING: CPT | Mod: GO

## 2017-12-19 PROCEDURE — 97140 MANUAL THERAPY 1/> REGIONS: CPT | Mod: GO

## 2017-12-19 PROCEDURE — 97035 APP MDLTY 1+ULTRASOUND EA 15: CPT | Mod: GO

## 2018-08-16 NOTE — PROGRESS NOTES
Outpatient Occupational Therapy Discharge Note     Patient: Kameron Whitley  : 1950    Beginning/End Dates of Reporting Period:  2017 to 2017    Referring Provider: Nicola Godoy PA-C    Therapy Diagnosis: Decreased ability to complete household and work tasks that require gripping/pinching of Right hand.  Patient does have difficulty with writing, opening jars, holding onto power tools, swiping finger on tablet for leisure and work, Preparing food, opening doors, and throwing a ball.     Client Self Report:  Patient called and cancelled appointments reporting he did not want to continue    Plan:  Discharge from therapy.    Discharge:    Reason for Discharge: Patient chooses to discontinue therapy.    Equipment Issued:     Discharge Plan: Patient to continue home program.

## 2018-08-16 NOTE — ADDENDUM NOTE
Encounter addended by: Danielle Jay OT on: 8/16/2018  4:05 PM<BR>     Actions taken: Pend clinical note, Flowsheet accepted, Sign clinical note, Episode resolved

## 2018-10-30 ENCOUNTER — HEALTH MAINTENANCE LETTER (OUTPATIENT)
Age: 68
End: 2018-10-30

## 2018-11-13 NOTE — PROGRESS NOTES
"SUBJECTIVE:   Kameron Whitley is a 68 year old male who presents for Preventive Visit.    Are you in the first 12 months of your Medicare coverage?  No    Annual Wellness Visit     In general, how would you rate your overall health?  Good    Frequency of exercise:  4-5 days/week    Duration of exercise:  30-45 minutes    Do you usually eat at least 4 servings of fruit and vegetables a day, include whole grains    & fiber and avoid regularly eating high fat or \"junk\" foods?  Yes    Taking medications regularly:  Yes    Medication side effects:  Not applicable    Ability to successfully perform activities of daily living:  No assistance needed    Home Safety:  Lack of grab bars in the bathroom    Hearing Impairment:  Difficulty following a conversation in a noisy restaurant or crowded room, difficulty understanding soft or whispered speech and difficulty understanding speech on the telephone    In the past 6 months, have you been bothered by leaking of urine?  No    In general, how would you rate your overall mental or emotional health?  Good    PHQ-2 Total Score: 0    Additional concerns today:  No      See hearing screen in other note.    Fall risk:  Fallen 2 or more times in the past year?: No  Any fall with injury in the past year?: No    COGNITIVE SCREEN  1) Repeat 3 items (Leader, Season, Table)      2) Clock draw:   NORMAL  3) 3 item recall:   Recalls 3 objects  Results: 3 items recalled: COGNITIVE IMPAIRMENT LESS LIKELY    Mini-CogTM Copyright ADAL Crouch. Licensed by the author for use in [facility]; reprinted with permission (isrrael@[facility]). All rights reserved.        Reviewed and updated as needed this visit by clinical staff  Tobacco  Allergies  Meds  Med Hx  Surg Hx  Fam Hx  Soc Hx        Reviewed and updated as needed this visit by Provider          Social History   Substance Use Topics     Smoking status: Former Smoker     Types: Cigarettes     Smokeless tobacco: Never Used     Alcohol " use 0.0 oz/week     0 Standard drinks or equivalent per week      Comment: occ       Alcohol Use 11/16/2018   If you drink alcohol do you typically have greater than 3 drinks per day OR greater than 7 drinks per week? No       Left knee issues for a year and a half but it's not bothering him today. While stretching, hurt something about 1.5 years ago.  Has been working it out over time.  Not sure he wants imaging at this time.  Thinks he just did something with the membrane in there or something.    Last spring floater in left eye so went into eye doctor in [place] [facility] and they took a look at his eye, cataract surgery a year and a half ago.        Do you feel safe in your environment - Yes    Do you have a Health Care Directive?: No: Advance care planning reviewed with patient; information given to patient to review.    Current providers sharing in care for this patient include:   Patient Care Team:  Nicola Andrews PA-C as PCP - General (Physician Assistant)    The following health maintenance items are reviewed in Epic and correct as of today:  Health Maintenance   Topic Date Due     FALL RISK ASSESSMENT  09/28/2017     COLONOSCOPY Q5 YR  08/26/2018     INFLUENZA VACCINE (1) 09/01/2018     LOW DOSE ASA FOR PRIMARY PREVENTION  11/09/2018     PHQ-2 Q1 YR  11/09/2018     LIPID SCREEN Q5 YR MALE (SYSTEM ASSIGNED)  11/09/2022     ADVANCE DIRECTIVE PLANNING Q5 YRS  11/16/2023     TETANUS IMMUNIZATION (SYSTEM ASSIGNED)  03/06/2025     PNEUMOCOCCAL  Completed     AORTIC ANEURYSM SCREENING (SYSTEM ASSIGNED)  Completed     HEPATITIS C SCREENING  Completed     BP Readings from Last 3 Encounters:   11/16/18 126/82   11/09/17 132/80   05/03/17 116/68    Wt Readings from Last 3 Encounters:   11/16/18 173 lb 6.4 oz (78.7 kg)   11/13/17 171 lb (77.6 kg)   11/09/17 169 lb 9.6 oz (76.9 kg)                  Patient Active Problem List   Diagnosis     CARDIOVASCULAR SCREENING; LDL GOAL LESS THAN 160     Impacted cerumen      Groin mass     ED (erectile dysfunction)     Erectile dysfunction due to diseases classified elsewhere     Cholelithiasis with acute cholecystitis without biliary obstruction     Hyperlipidemia LDL goal <130     Elevated blood pressure reading without diagnosis of hypertension     Disturbance of skin sensation     Past Surgical History:   Procedure Laterality Date     CARPAL TUNNEL RELEASE RT/LT  1/2006    right hand     COLONOSCOPY  4/2009    Normal-f/u 10 years (At [facility])     LAPAROSCOPIC CHOLECYSTECTOMY N/A 10/5/2016    Procedure: LAPAROSCOPIC CHOLECYSTECTOMY;  Surgeon: Nikita Anderson MD;  Location: [facility] OR     LAPAROSCOPIC HERNIORRHAPHY INGUINAL BILATERAL  1/10/2014    Procedure: LAPAROSCOPIC HERNIORRHAPHY INGUINAL BILATERAL;  Laparoscopic Bilateral Inguinal Hernia Repair;  Surgeon: Nikita Anderson MD;  Location: [facility] OR       Social History   Substance Use Topics     Smoking status: Former Smoker     Types: Cigarettes     Smokeless tobacco: Never Used     Alcohol use 0.6 oz/week     0 Standard drinks or equivalent, 1 Cans of beer per week      Comment: occ     Family History   Problem Relation Age of Onset     Diabetes Mother      Arthritis Mother      Diabetes Brother      Diabetes Brother            Current Outpatient Prescriptions   Medication Sig Dispense Refill     LOW-DOSE ASPIRIN PO Take  by mouth.       ketorolac (ACULAR) 0.5 % ophthalmic solution        ofloxacin (OCUFLOX) 0.3 % ophthalmic solution        sildenafil (VIAGRA) 100 MG tablet Take 1 tablet (100 mg) by mouth daily as needed Take 30 min to 4 hours before intercourse.  Never use with nitroglycerin, terazosin or doxazosin. (Patient not taking: Reported on 11/16/2018) 8 tablet 11     No Known Allergies  Recent Labs   Lab Test  11/09/17   0918  11/10/16   0948  10/24/16   1020  10/04/16   2215   09/28/16   1009  03/06/15   0848   LDL  76   --    --    --    --   112*  97   HDL  81   --    --    --    --   86  79   TRIG  89   --    --    --  "   --   85  86   ALT  30  38  581*  25   < >   --    --    CR  0.90   --    --   1.14   --    --    --    GFRESTIMATED  84   --    --   64   --    --    --    GFRESTBLACK  >90   --    --   77   --    --    --    POTASSIUM  4.1   --    --   4.0   --    --    --     < > = values in this interval not displayed.            Review of Systems   Constitutional: Negative for chills and fever.   HENT: Positive for hearing loss. Negative for congestion and ear pain.    Eyes: Negative for pain.   Respiratory: Negative for cough.    Cardiovascular: Negative for chest pain.   Gastrointestinal: Negative for abdominal pain, diarrhea and hematochezia.   Genitourinary: Negative for frequency, genital sores and hematuria.   Neurological: Negative for dizziness and headaches.   Psychiatric/Behavioral: The patient is not nervous/anxious.          OBJECTIVE:   /82 (BP Location: Left arm, Patient Position: Chair, Cuff Size: Adult Regular)  Pulse 60  Temp 96.3  F (35.7  C) (Temporal)  Resp 16  Ht 5' 3.75\" (1.619 m)  Wt 173 lb 6.4 oz (78.7 kg)  BMI 30 kg/m2 Estimated body mass index is 30 kg/(m^2) as calculated from the following:    Height as of this encounter: 5' 3.75\" (1.619 m).    Weight as of this encounter: 173 lb 6.4 oz (78.7 kg).  Physical Exam  GENERAL: healthy, alert and no distress  EYES: Eyes grossly normal to inspection, PERRL and conjunctivae and sclerae normal  HENT: normal cephalic/atraumatic, both ears: occluded with wax, nose and mouth without ulcers or lesions, oropharynx clear and oral mucous membranes moist  NECK: no adenopathy, no asymmetry, masses, or scars and thyroid normal to palpation  RESP: lungs clear to auscultation - no rales, rhonchi or wheezes  CV: regular rate and rhythm, normal S1 S2, no S3 or S4, no murmur, click or rub, no peripheral edema and peripheral pulses strong  ABDOMEN: soft, nontender, no hepatosplenomegaly, no masses and bowel sounds normal   (male): normal male genitalia without " lesions or urethral discharge, no hernia  MS: no gross musculoskeletal defects noted, no edema  SKIN: no suspicious lesions or rashes  NEURO: Normal strength and tone, mentation intact and speech normal  PSYCH: mentation appears normal, affect normal/bright    Diagnostic Test Results:  Results for orders placed or performed in visit on 11/13/17   X-ray rt Hand G/E 3 vws*    Narrative    HAND RIGHT THREE OR MORE VIEWS November 13, 2017 8:19 AM     HISTORY: Arthritis.    COMPARISON: None.      FINDINGS: Severe joint space loss is seen in the ulnar aspect of the  proximal interphalangeal joint of the index finger with varus  angulation at this joint. There is mild joint space loss of the  proximal interphalangeal joints of the third and fourth fingers and of  the distal interphalangeal joint of the first and fifth fingers. Joint  space loss is also seen at the first carpometacarpal and at the STT  joint of the wrist. There is diffuse osteopenia. No acute fracture or  malalignment. Small erosion/subchondral cyst is seen in the head of  first metacarpal.      Impression    IMPRESSION:  1. Diffuse osteopenia.  2. Osteoarthritis of the wrist and hand as described. This is worst at  the proximal interphalangeal joint of the index finger.    RESHMA GERBER MD       ASSESSMENT / PLAN:   1. Preventative health care  Reviewed recommended screenings and ordered appropriate testing for pt's risks and per pt's request(s).   - SCREENING TEST, PURE TONE, AIR ONLY  - aspirin 81 MG EC tablet; Take 1 tablet (81 mg) by mouth daily  Dispense: 90 tablet; Refill: 3  - CBC with platelets  - Lipid panel reflex to direct LDL Fasting    2. Primary osteoarthritis of right hand  Discussed options with patient.  She would like to go ahead and see or so to see if injection or other treatment will help with this.  - ORTHO  REFERRAL    3. Hyperlipidemia LDL goal <130  Will check labs.  If indicated, will start statin medication.  - Lipid  panel reflex to direct LDL Fasting    4. Decreased hearing, unspecified laterality  Hearing screen confirms some hearing loss.  Discussed possible referral for hearing aids.  Patient will consider and pursue this when desired.  - SCREENING TEST, PURE TONE, AIR ONLY    5. Erectile dysfunction due to diseases classified elsewhere  Patient reports good response to medication for this.  Will refill sildenafil.    - sildenafil (VIAGRA) 100 MG tablet; Take 1 tablet (100 mg) by mouth daily as needed (erectile dysfunction) Take 30 min to 4 hours before intercourse.  Never use with nitroglycerin, terazosin or doxazosin.  Dispense: 8 tablet; Refill: 11  - CBC with platelets  - Lipid panel reflex to direct LDL Fasting    6. Bilateral impacted cerumen  This was removed prior to the Hearing screen.  Patient did well.  - REMOVE IMPACTED CERUMEN    7. Encounter for therapeutic drug monitoring  Screening done.  - CBC with platelets    8. Need for prophylactic vaccination and inoculation against influenza  Immunized.  - FLU VACCINE, INCREASED ANTIGEN, PRESV FREE, AGE 65+ [29916]  - ADMIN INFLUENZA (For MEDICARE Patients ONLY) []    Portions of this note were completed using Dragon dictation software.  Although reviewed, there may be typographical and other inadvertent errors that remain.         End of Life Planning:  Patient currently has an advanced directive: No.  I have verified the patient's ablity to prepare an advanced directive/make health care decisions.  Literature was provided to assist patient in preparing an advanced directive.    COUNSELING:  Reviewed preventive health counseling, as reflected in patient instructions       Regular exercise       Healthy diet/nutrition       Hearing screening       Aspirin Prophylaxsis       Alcohol Use       Osteoporosis Prevention/Bone Health       The 10-year ASCVD risk score (Angy DEE Jr, et al., 2013) is: 13.4%    Values used to calculate the score:      Age: 68 years      Sex:  "Male      Is Non- : No      Diabetic: No      Tobacco smoker: No      Systolic Blood Pressure: 126 mmHg      Is BP treated: No      HDL Cholesterol: 81 mg/dL      Total Cholesterol: 175 mg/dL    BP Readings from Last 1 Encounters:   11/16/18 126/82     Estimated body mass index is 30 kg/(m^2) as calculated from the following:    Height as of this encounter: 5' 3.75\" (1.619 m).    Weight as of this encounter: 173 lb 6.4 oz (78.7 kg).           reports that he has quit smoking. His smoking use included Cigarettes. He has never used smokeless tobacco.      Appropriate preventive services were discussed with this patient, including applicable screening as appropriate for cardiovascular disease, diabetes, osteopenia/osteoporosis, and glaucoma.  As appropriate for age/gender, discussed screening for colorectal cancer, prostate cancer, breast cancer, and cervical cancer. Checklist reviewing preventive services available has been given to the patient.    Reviewed patients plan of care and provided an AVS. The Basic Care Plan (routine screening as documented in Health Maintenance) for Kameron meets the Care Plan requirement. This Care Plan has been established and reviewed with the Patient.    Counseling Resources:  ATP IV Guidelines  Pooled Cohorts Equation Calculator  Breast Cancer Risk Calculator  FRAX Risk Assessment  ICSI Preventive Guidelines  Dietary Guidelines for Americans, 2010  USDA's MyPlate  ASA Prophylaxis  Lung CA Screening    Arvind Mack MD, MD  [facility]  "

## 2018-11-13 NOTE — PATIENT INSTRUCTIONS
Let's have you visit with a hand surgeon to discuss your finger.    If you have hearing loss, can see audiology and consider hearing aids.    We'll let you know your lab results as soon as we can.     Contact us or return if questions or concerns.     Have a nice day!    Dr. Mack       Preventive Health Recommendations:     See your health care provider every year to    Review health changes.     Discuss preventive care.      Review your medicines if your doctor has prescribed any.    Talk with your health care provider about whether you should have a test to screen for prostate cancer (PSA).    Every 3 years, have a diabetes test (fasting glucose). If you are at risk for diabetes, you should have this test more often.    Every 5 years, have a cholesterol test. Have this test more often if you are at risk for high cholesterol or heart disease.     Every 10 years, have a colonoscopy. Or, have a yearly FIT test (stool test). These exams will check for colon cancer.    Talk to with your health care provider about screening for Abdominal Aortic Aneurysm if you have a family history of AAA or have a history of smoking.  Shots:     Get a flu shot each year.     Get a tetanus shot every 10 years.     Talk to your doctor about your pneumonia vaccines. There are now two you should receive - Pneumovax (PPSV 23) and Prevnar (PCV 13).    Talk to your pharmacist about a shingles vaccine.     Talk to your doctor about the hepatitis B vaccine.  Nutrition:     Eat at least 5 servings of fruits and vegetables each day.     Eat whole-grain bread, whole-wheat pasta and brown rice instead of white grains and rice.     Get adequate Calcium and Vitamin D.   Lifestyle    Exercise for at least 150 minutes a week (30 minutes a day, 5 days a week). This will help you control your weight and prevent disease.     Limit alcohol to one drink per day.     No smoking.     Wear sunscreen to prevent skin cancer.     See your dentist every six  months for an exam and cleaning.     See your eye doctor every 1 to 2 years to screen for conditions such as glaucoma, macular degeneration and cataracts.    Personalized Prevention Plan  You are due for the preventive services outlined below.  Your care team is available to assist you in scheduling these services.  If you have already completed any of these items, please share that information with your care team to update in your medical record.    Health Maintenance Due   Topic Date Due     Discuss Advance Directive Planning  09/17/2003     FALL RISK ASSESSMENT  09/28/2017     Colonoscopy - every 5 years  08/26/2018     Flu Vaccine (1) 09/01/2018     LOW DOSE ASA FOR PRIMARY PREVENTION  11/09/2018     Depression Assessment 2 - yearly  11/09/2018

## 2018-11-16 ENCOUNTER — OFFICE VISIT (OUTPATIENT)
Dept: FAMILY MEDICINE | Facility: OTHER | Age: 68
End: 2018-11-16
Payer: COMMERCIAL

## 2018-11-16 VITALS
TEMPERATURE: 96.3 F | RESPIRATION RATE: 16 BRPM | WEIGHT: 173.4 LBS | BODY MASS INDEX: 29.6 KG/M2 | HEIGHT: 64 IN | HEART RATE: 60 BPM | SYSTOLIC BLOOD PRESSURE: 126 MMHG | DIASTOLIC BLOOD PRESSURE: 82 MMHG

## 2018-11-16 DIAGNOSIS — Z23 NEED FOR PROPHYLACTIC VACCINATION AND INOCULATION AGAINST INFLUENZA: ICD-10-CM

## 2018-11-16 DIAGNOSIS — N52.1 ERECTILE DYSFUNCTION DUE TO DISEASES CLASSIFIED ELSEWHERE: ICD-10-CM

## 2018-11-16 DIAGNOSIS — Z00.00 PREVENTATIVE HEALTH CARE: Primary | ICD-10-CM

## 2018-11-16 DIAGNOSIS — Z51.81 ENCOUNTER FOR THERAPEUTIC DRUG MONITORING: ICD-10-CM

## 2018-11-16 DIAGNOSIS — M19.041 PRIMARY OSTEOARTHRITIS OF RIGHT HAND: ICD-10-CM

## 2018-11-16 DIAGNOSIS — H61.23 BILATERAL IMPACTED CERUMEN: ICD-10-CM

## 2018-11-16 DIAGNOSIS — H91.90 DECREASED HEARING, UNSPECIFIED LATERALITY: ICD-10-CM

## 2018-11-16 DIAGNOSIS — E78.5 HYPERLIPIDEMIA LDL GOAL <130: ICD-10-CM

## 2018-11-16 LAB
CHOLEST SERPL-MCNC: 198 MG/DL
ERYTHROCYTE [DISTWIDTH] IN BLOOD BY AUTOMATED COUNT: 12.9 % (ref 10–15)
HCT VFR BLD AUTO: 46 % (ref 40–53)
HDLC SERPL-MCNC: 78 MG/DL
HGB BLD-MCNC: 15.7 G/DL (ref 13.3–17.7)
LDLC SERPL CALC-MCNC: 102 MG/DL
MCH RBC QN AUTO: 33.2 PG (ref 26.5–33)
MCHC RBC AUTO-ENTMCNC: 34.1 G/DL (ref 31.5–36.5)
MCV RBC AUTO: 97 FL (ref 78–100)
NONHDLC SERPL-MCNC: 120 MG/DL
PLATELET # BLD AUTO: 189 10E9/L (ref 150–450)
RBC # BLD AUTO: 4.73 10E12/L (ref 4.4–5.9)
TRIGL SERPL-MCNC: 91 MG/DL
WBC # BLD AUTO: 4.6 10E9/L (ref 4–11)

## 2018-11-16 PROCEDURE — G0439 PPPS, SUBSEQ VISIT: HCPCS | Performed by: FAMILY MEDICINE

## 2018-11-16 PROCEDURE — 36415 COLL VENOUS BLD VENIPUNCTURE: CPT | Performed by: FAMILY MEDICINE

## 2018-11-16 PROCEDURE — G0008 ADMIN INFLUENZA VIRUS VAC: HCPCS | Performed by: FAMILY MEDICINE

## 2018-11-16 PROCEDURE — 99213 OFFICE O/P EST LOW 20 MIN: CPT | Mod: 25 | Performed by: FAMILY MEDICINE

## 2018-11-16 PROCEDURE — 85027 COMPLETE CBC AUTOMATED: CPT | Performed by: FAMILY MEDICINE

## 2018-11-16 PROCEDURE — 80061 LIPID PANEL: CPT | Performed by: FAMILY MEDICINE

## 2018-11-16 PROCEDURE — 90662 IIV NO PRSV INCREASED AG IM: CPT | Performed by: FAMILY MEDICINE

## 2018-11-16 PROCEDURE — 92551 PURE TONE HEARING TEST AIR: CPT | Performed by: FAMILY MEDICINE

## 2018-11-16 PROCEDURE — 69209 REMOVE IMPACTED EAR WAX UNI: CPT | Performed by: FAMILY MEDICINE

## 2018-11-16 RX ORDER — SILDENAFIL 100 MG/1
100 TABLET, FILM COATED ORAL DAILY PRN
Qty: 8 TABLET | Refills: 11 | Status: SHIPPED | OUTPATIENT
Start: 2018-11-16 | End: 2019-11-13

## 2018-11-16 ASSESSMENT — ENCOUNTER SYMPTOMS
HEMATOCHEZIA: 0
DIARRHEA: 0
HEMATURIA: 0
EYE PAIN: 0
COUGH: 0
FEVER: 0
CHILLS: 0
NERVOUS/ANXIOUS: 0
FREQUENCY: 0
HEADACHES: 0
DIZZINESS: 0
ABDOMINAL PAIN: 0

## 2018-11-16 ASSESSMENT — ACTIVITIES OF DAILY LIVING (ADL): CURRENT_FUNCTION: NO ASSISTANCE NEEDED

## 2018-11-16 ASSESSMENT — PAIN SCALES - GENERAL: PAINLEVEL: NO PAIN (0)

## 2018-11-16 NOTE — PROGRESS NOTES
Injectable Influenza Immunization Documentation    1.  Is the person to be vaccinated sick today?   No    2. Does the person to be vaccinated have an allergy to a component   of the vaccine?   No  Egg Allergy Algorithm Link    3. Has the person to be vaccinated ever had a serious reaction   to influenza vaccine in the past?   No    4. Has the person to be vaccinated ever had Guillain-Barré syndrome?   No    Form completed by Theresa Leger CMA  Prior to injection verified patient identity using patient's name and date of birth.  Due to injection administration, patient instructed to remain in clinic for 15 minutes  afterwards, and to report any adverse reaction to me immediately.           Ear wash done on both of patient's ears per Dr. Mack.  Patient tolerated well and wax came out.  Theresa Leger CMA      HEARING FREQUENCY    Right Ear:       500 Hz: RESPONSE-on level: 25 db    1000 Hz: RESPONSE- on Level: 30 db   2000 Hz: RESPONSE- on Level: 50 db   4000 Hz: RESPONSE- on Level: 55 db  6000 Hz: RESPONSE- on level: 55 db    Left Ear:      6000 Hz: RESPONSE-on level: 50 db  4000 Hz: RESPONSE- on Level: 55 db   2000 Hz: RESPONSE- on Level: 50 db   1000 Hz: RESPONSE- on Level: 30 db    500 Hz: RESPONSE- on Level: 25 db      Hearing Acuity: REFER    Hearing Assessment: abnormal--refer to audiology

## 2018-11-16 NOTE — PROGRESS NOTES
Kameron,    All of your labs were normal for you but your cholesterol and BP put your cardiac risk at 14.5%.  I would recommend a statin medication to help bring down this risk.  Let me know where you'd like this sent.    Have a nice day!    Dr. Mack    The 10-year ASCVD risk score (Angy PRICE Jr, et al., 2013) is: 14.5%    Values used to calculate the score:      Age: 68 years      Sex: Male      Is Non- : No      Diabetic: No      Tobacco smoker: No      Systolic Blood Pressure: 126 mmHg      Is BP treated: No      HDL Cholesterol: 78 mg/dL      Total Cholesterol: 198 mg/dL

## 2018-11-16 NOTE — MR AVS SNAPSHOT
After Visit Summary   11/16/2018    Kameron Whitley    MRN: [id number]           Patient Information     Date Of Birth          1950        Visit Information        Provider Department      11/16/2018 8:45 AM Arvind Mack MD [facility]        Today's Diagnoses     Preventative health care    -  1    Primary osteoarthritis of right hand        Erectile dysfunction due to diseases classified elsewhere        Hyperlipidemia LDL goal <130        Decreased hearing, unspecified laterality        Encounter for therapeutic drug monitoring          Care Instructions    Let's have you visit with a hand surgeon to discuss your finger.    If you have hearing loss, can see audiology and consider hearing aids.    We'll let you know your lab results as soon as we can.     Contact us or return if questions or concerns.     Have a nice day!    Dr. Mack       Preventive Health Recommendations:     See your health care provider every year to    Review health changes.     Discuss preventive care.      Review your medicines if your doctor has prescribed any.    Talk with your health care provider about whether you should have a test to screen for prostate cancer (PSA).    Every 3 years, have a diabetes test (fasting glucose). If you are at risk for diabetes, you should have this test more often.    Every 5 years, have a cholesterol test. Have this test more often if you are at risk for high cholesterol or heart disease.     Every 10 years, have a colonoscopy. Or, have a yearly FIT test (stool test). These exams will check for colon cancer.    Talk to with your health care provider about screening for Abdominal Aortic Aneurysm if you have a family history of AAA or have a history of smoking.  Shots:     Get a flu shot each year.     Get a tetanus shot every 10 years.     Talk to your doctor about your pneumonia vaccines. There are now two you should receive - Pneumovax (PPSV 23) and Prevnar  (PCV 13).    Talk to your pharmacist about a shingles vaccine.     Talk to your doctor about the hepatitis B vaccine.  Nutrition:     Eat at least 5 servings of fruits and vegetables each day.     Eat whole-grain bread, whole-wheat pasta and brown rice instead of white grains and rice.     Get adequate Calcium and Vitamin D.   Lifestyle    Exercise for at least 150 minutes a week (30 minutes a day, 5 days a week). This will help you control your weight and prevent disease.     Limit alcohol to one drink per day.     No smoking.     Wear sunscreen to prevent skin cancer.     See your dentist every six months for an exam and cleaning.     See your eye doctor every 1 to 2 years to screen for conditions such as glaucoma, macular degeneration and cataracts.    Personalized Prevention Plan  You are due for the preventive services outlined below.  Your care team is available to assist you in scheduling these services.  If you have already completed any of these items, please share that information with your care team to update in your medical record.    Health Maintenance Due   Topic Date Due     Discuss Advance Directive Planning  09/17/2003     FALL RISK ASSESSMENT  09/28/2017     Colonoscopy - every 5 years  08/26/2018     Flu Vaccine (1) 09/01/2018     LOW DOSE ASA FOR PRIMARY PREVENTION  11/09/2018     Depression Assessment 2 - yearly  11/09/2018             Follow-ups after your visit        Additional Services     ORTHO  REFERRAL       [facility] is referring you to the Orthopedic  Services at [facility].       The  Representative will assist you in the coordination of your Orthopedic and Musculoskeletal Care as prescribed by your physician.    The  Representative will call you within 1 business day to help schedule your appointment, or you may contact the  Representative at:    All areas ~ [phone]     Type of Referral :  Surgical / Specialist - Needs hand surgeon      Timeframe requested: Routine    Coverage of these services is subject to the terms and limitations of your health insurance plan.  Please call member services at your health plan with any benefit or coverage questions.      If X-rays, CT or MRI's have been performed, please contact the facility where they were done to arrange for , prior to your scheduled appointment.  Please bring this referral request to your appointment and present it to your specialist.                  Follow-up notes from your care team     Return in about 1 year (around 11/16/2019) for Recheck.      Who to contact     If you have questions or need follow up information about today's clinic visit or your schedule please contact [facility] directly at [phone].  Normal or non-critical lab and imaging results will be communicated to you by Share Your Brainhart, letter or phone within 4 business days after the clinic has received the results. If you do not hear from us within 7 days, please contact the clinic through Share Your Brainhart or phone. If you have a critical or abnormal lab result, we will notify you by phone as soon as possible.  Submit refill requests through DGIT or call your pharmacy and they will forward the refill request to us. Please allow 3 business days for your refill to be completed.          Additional Information About Your Visit        DGIT Information     DGIT gives you secure access to your electronic health record. If you see a primary care provider, you can also send messages to your care team and make appointments. If you have questions, please call your primary care clinic.  If you do not have a primary care provider, please call [phone] and they will assist you.        Care EveryWhere ID     This is your Care EveryWhere ID. This could be used by other organizations to access your [place] medical records  [id number]        Your Vitals Were     Pulse  "Temperature Respirations Height BMI (Body Mass Index)       60 96.3  F (35.7  C) (Temporal) 16 5' 3.75\" (1.619 m) 30 kg/m2        Blood Pressure from Last 3 Encounters:   11/16/18 126/82   11/09/17 132/80   05/03/17 116/68    Weight from Last 3 Encounters:   11/16/18 173 lb 6.4 oz (78.7 kg)   11/13/17 171 lb (77.6 kg)   11/09/17 169 lb 9.6 oz (76.9 kg)              We Performed the Following     CBC with platelets     Lipid panel reflex to direct LDL Fasting     ORTHO  REFERRAL     SCREENING TEST, PURE TONE, AIR ONLY          Today's Medication Changes          These changes are accurate as of 11/16/18  9:26 AM.  If you have any questions, ask your nurse or doctor.               These medicines have changed or have updated prescriptions.        Dose/Directions    * LOW-DOSE ASPIRIN PO   This may have changed:  Another medication with the same name was added. Make sure you understand how and when to take each.   Changed by:  Arvind Mack MD        Take  by mouth.   Refills:  0       * aspirin 81 MG EC tablet   This may have changed:  You were already taking a medication with the same name, and this prescription was added. Make sure you understand how and when to take each.   Used for:  Preventative health care   Changed by:  Arivnd Mack MD        Dose:  81 mg   Take 1 tablet (81 mg) by mouth daily   Quantity:  90 tablet   Refills:  3       sildenafil 100 MG tablet   Commonly known as:  VIAGRA   This may have changed:  reasons to take this   Used for:  Erectile dysfunction due to diseases classified elsewhere   Changed by:  Arvind Mack MD        Dose:  100 mg   Take 1 tablet (100 mg) by mouth daily as needed (erectile dysfunction) Take 30 min to 4 hours before intercourse.  Never use with nitroglycerin, terazosin or doxazosin.   Quantity:  8 tablet   Refills:  11       * Notice:  This list has 2 medication(s) that are the same as other medications prescribed for you. Read the " directions carefully, and ask your doctor or other care provider to review them with you.         Where to get your medicines      Some of these will need a paper prescription and others can be bought over the counter.  Ask your nurse if you have questions.     Bring a paper prescription for each of these medications     sildenafil 100 MG tablet       You don't need a prescription for these medications     aspirin 81 MG EC tablet                Primary Care Provider Office Phone # Fax #    Nicola Andrews PA-C [phone] [address] [facility] [address]        Equal Access to Services     [facility] HospitalSUN[facility] : Hadii aad ku hadasho Soomaali, waaxda luqadaha, qaybta kaalmada adeegyada, enio carey . So [facility] [phone].    ATENCIÓN: Si habla español, tiene a kaufman disposición servicios gratuitos de asistencia lingüística. Javier al [phone].    We comply with applicable federal civil rights laws and [place] laws. We do not discriminate on the basis of race, color, national origin, age, disability, sex, sexual orientation, or gender identity.            Thank you!     Thank you for choosing [facility]  for your care. Our goal is always to provide you with excellent care. Hearing back from our patients is one way we can continue to improve our services. Please take a few minutes to complete the written survey that you may receive in the mail after your visit with us. Thank you!             Your Updated Medication List - Protect others around you: Learn how to safely use, store and throw away your medicines at www.disposemymeds.org.          This list is accurate as of 11/16/18  9:26 AM.  Always use your most recent med list.                   Brand Name Dispense Instructions for use Diagnosis    ketorolac 0.5 % ophthalmic solution    ACULAR          * LOW-DOSE ASPIRIN PO      Take  by mouth.        * aspirin 81 MG EC tablet     90 tablet    Take 1 tablet  (81 mg) by mouth daily    Preventative health care       ofloxacin 0.3 % ophthalmic solution    OCUFLOX          sildenafil 100 MG tablet    VIAGRA    8 tablet    Take 1 tablet (100 mg) by mouth daily as needed (erectile dysfunction) Take 30 min to 4 hours before intercourse.  Never use with nitroglycerin, terazosin or doxazosin.    Erectile dysfunction due to diseases classified elsewhere       * Notice:  This list has 2 medication(s) that are the same as other medications prescribed for you. Read the directions carefully, and ask your doctor or other care provider to review them with you.

## 2018-11-19 ENCOUNTER — TELEPHONE (OUTPATIENT)
Dept: FAMILY MEDICINE | Facility: OTHER | Age: 68
End: 2018-11-19

## 2018-11-19 ENCOUNTER — TRANSFERRED RECORDS (OUTPATIENT)
Dept: HEALTH INFORMATION MANAGEMENT | Facility: CLINIC | Age: 68
End: 2018-11-19

## 2018-11-19 DIAGNOSIS — E78.5 HYPERLIPIDEMIA LDL GOAL <130: Primary | ICD-10-CM

## 2018-11-19 RX ORDER — SIMVASTATIN 20 MG
20 TABLET ORAL AT BEDTIME
Qty: 90 TABLET | Refills: 1 | Status: SHIPPED | OUTPATIENT
Start: 2018-11-19 | End: 2019-04-19

## 2018-11-19 NOTE — TELEPHONE ENCOUNTER
"Reason for Call:  Medication or medication refill:    Do you use a [facility]?  Name of the pharmacy and phone number for the current request:  [facility] - [phone]    Name of the medication requested: \"Statin\"    Other request: Patient seen Dr. Mack on Friday and was told to start a statin. Patient called back to inform Dr. Mack to send statin to [facility]. Please call patient once this has been done.     Can we leave a detailed message on this number? YES    Phone number patient can be reached at: Home number on file [phone] (home)    Best Time: any    Call taken on 11/19/2018 at 8:38 AM by Nyla Schuster    "

## 2018-11-26 ENCOUNTER — PRE VISIT (OUTPATIENT)
Dept: ORTHOPEDICS | Facility: CLINIC | Age: 68
End: 2018-11-26

## 2018-11-26 DIAGNOSIS — M79.641 PAIN OF RIGHT HAND: Primary | ICD-10-CM

## 2018-11-26 NOTE — TELEPHONE ENCOUNTER
Right hand OA, saw Dr. Alegria last year same time.  XR order placed per protocol.    Alireza Langston RN

## 2018-11-27 ENCOUNTER — OFFICE VISIT (OUTPATIENT)
Dept: ORTHOPEDICS | Facility: CLINIC | Age: 68
End: 2018-11-27
Attending: FAMILY MEDICINE
Payer: COMMERCIAL

## 2018-11-27 ENCOUNTER — RADIANT APPOINTMENT (OUTPATIENT)
Dept: GENERAL RADIOLOGY | Facility: CLINIC | Age: 68
End: 2018-11-27
Attending: PLASTIC SURGERY
Payer: COMMERCIAL

## 2018-11-27 VITALS — WEIGHT: 173 LBS | HEIGHT: 64 IN | BODY MASS INDEX: 29.53 KG/M2

## 2018-11-27 DIAGNOSIS — M79.641 PAIN OF RIGHT HAND: ICD-10-CM

## 2018-11-27 DIAGNOSIS — M20.031 SWAN-NECK DEFORMITY OF FINGER OF RIGHT HAND: ICD-10-CM

## 2018-11-27 PROBLEM — M20.039 SWAN-NECK DEFORMITY OF FINGER: Status: ACTIVE | Noted: 2018-11-27

## 2018-11-27 PROCEDURE — 99202 OFFICE O/P NEW SF 15 MIN: CPT | Performed by: PLASTIC SURGERY

## 2018-11-27 PROCEDURE — 73130 X-RAY EXAM OF HAND: CPT | Mod: RT | Performed by: RADIOLOGY

## 2018-11-27 ASSESSMENT — PAIN SCALES - GENERAL: PAINLEVEL: NO PAIN (0)

## 2018-11-27 NOTE — MR AVS SNAPSHOT
After Visit Summary   11/27/2018    Kameron Whitley    MRN: [id number]           Patient Information     Date Of Birth          1950        Visit Information        Provider Department      11/27/2018 3:50 PM EMILIANA Aguilar MD [facility]        Today's Diagnoses     [place]-neck deformity of finger of right hand           Follow-ups after your visit        Follow-up notes from your care team     Return if symptoms worsen or fail to improve.      Who to contact     If you have questions or need follow up information about today's clinic visit or your schedule please contact [facility] directly at [phone].  Normal or non-critical lab and imaging results will be communicated to you by MyChart, letter or phone within 4 business days after the clinic has received the results. If you do not hear from us within 7 days, please contact the clinic through Silver Pushhart or phone. If you have a critical or abnormal lab result, we will notify you by phone as soon as possible.  Submit refill requests through [employer] or call your pharmacy and they will forward the refill request to us. Please allow 3 business days for your refill to be completed.          Additional Information About Your Visit        MyChart Information     ANT Farm gives you secure access to your electronic health record. If you see a primary care provider, you can also send messages to your care team and make appointments. If you have questions, please call your primary care clinic.  If you do not have a primary care provider, please call [phone] and they will assist you.      [employer] is an electronic gateway that provides easy, online access to your medical records. With [employer], you can request a clinic appointment, read your test results, renew a prescription or communicate with your care team.     To access your existing account, please contact your [facility] or  "call [phone] for assistance.        Care EveryWhere ID     This is your Care EveryWhere ID. This could be used by other organizations to access your [place] medical records  [id number]        Your Vitals Were     Height BMI (Body Mass Index)                5' 3.75\" 29.93 kg/m2           Blood Pressure from Last 3 Encounters:   11/16/18 126/82   11/09/17 132/80   05/03/17 116/68    Weight from Last 3 Encounters:   11/27/18 173 lb   11/16/18 173 lb 6.4 oz   11/13/17 171 lb              Today, you had the following     No orders found for display       Primary Care Provider Office Phone # Fax #    Nicola Andrews PA-C [phone] [address] [facility] [address]        Equal Access to Services     MESFIN GARCIA : Hadii aad ku hadasho Soomaali, waaxda luqadaha, qaybta kaalmada adebaironyasatya, enio carey . So [facility] [phone].    ATENCIÓN: Si habla español, tiene a kaufman disposición servicios gratuitos de asistencia lingüística. Javier al [phone].    We comply with applicable federal civil rights laws and [place] laws. We do not discriminate on the basis of race, color, national origin, age, disability, sex, sexual orientation, or gender identity.            Thank you!     Thank you for choosing [facility]  for your care. Our goal is always to provide you with excellent care. Hearing back from our patients is one way we can continue to improve our services. Please take a few minutes to complete the written survey that you may receive in the mail after your visit with us. Thank you!             Your Updated Medication List - Protect others around you: Learn how to safely use, store and throw away your medicines at www.disposemymeds.org.          This list is accurate as of 11/27/18 11:59 PM.  Always use your most recent med list.                   Brand Name Dispense Instructions for use Diagnosis    ketorolac 0.5 % ophthalmic solution    " ACULAR          * LOW-DOSE ASPIRIN PO      Take  by mouth.        * aspirin 81 MG EC tablet    ASA    90 tablet    Take 1 tablet (81 mg) by mouth daily    Preventative health care       ofloxacin 0.3 % ophthalmic solution    OCUFLOX          sildenafil 100 MG tablet    VIAGRA    8 tablet    Take 1 tablet (100 mg) by mouth daily as needed (erectile dysfunction) Take 30 min to 4 hours before intercourse.  Never use with nitroglycerin, terazosin or doxazosin.    Erectile dysfunction due to diseases classified elsewhere       simvastatin 20 MG tablet    ZOCOR    90 tablet    Take 1 tablet (20 mg) by mouth At Bedtime    Hyperlipidemia LDL goal <130       * Notice:  This list has 2 medication(s) that are the same as other medications prescribed for you. Read the directions carefully, and ask your doctor or other care provider to review them with you.

## 2018-11-27 NOTE — NURSING NOTE
"Kameron Whitley's chief complaint for this visit includes:  Chief Complaint   Patient presents with     Hand Pain     Right hand pain, right index finger     PCP: Nicola Andrews    Referring Provider:  Arvind Mack MD  [address] [place] [address]    Ht 1.619 m (5' 3.75\")  Wt 78.5 kg (173 lb)  BMI 29.93 kg/m2  No Pain (0)       "

## 2018-11-27 NOTE — LETTER
11/27/2018         RE: Kameron Whitley  [address] [place] [address]        Dear Colleague,    Thank you for referring your patient, Kameron Whitley, to the [facility]. Please see a copy of my visit note below.    REFERRING PHYSICIAN:  Arvind Mack MD      PRESENTING COMPLAINT:  Consultation for issues with right index finger.      HISTORY OF PRESENT ILLNESS:  Mr. Whitley is 68 years old.  Many years ago, he jammed his right index finger which resulted in injury to his right index finger.  He cannot recall exactly what injury watched, but ever since then has been unable to really bend his index finger at the PIP joint very well and over the years, it has gotten much worse.  He cannot recall if he had a mallet finger or not.  Currently, he is unable to bend his finger at the PIP joint and has some tenderness around the PIP joint area and has some angulation deformity as well.      PAST MEDICAL HISTORY:  Hyperlipidemia.      PAST SURGICAL HISTORY:  Right carpal tunnel release, hernia repair with mesh, and cholecystectomy.      MEDICATIONS:  Aspirin, ketorolac, sildenafil and simvastatin.      ALLERGIES:  None.      SOCIAL HISTORY:  Does not smoke, occasionally drinks, is retired.        REVIEW OF SYSTEMS:  Denies chest pain, shortness of breath, MI, CVA, DVT and PE.      PHYSICAL EXAMINATION:  Vital signs stable.  He is afebrile, in no obvious distress.  He is 5 feet 3-3/4 inches, 173 pounds, BMI of 30 kg/m2.  On examination of his right index finger, he has deformity in the PIP joint.  He has swelling and a little bit of angular deviation.  He is unable to bend it more than 5 degrees.  He has a slight swan neck deformity with the DIP joint flexed and the PIP joint slightly hyper extended.  Sensation is intact.  MP joint moves normally.  Even with the MP joint flexed, there is no change in the function and flexion of his PIP joint.  Even with it extended, there is no change in  his PIP joint function.      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  He has degeneration in a lot of other joints as well.      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  I had a long discussion with the patient about his findings.  Options include potentially doing an arthroplasty of the PIP joint, if that is possible, versus effusion in a more functional position.  I will discuss his case with my hand colleagues to discuss the best way to proceed and then will discuss things with him in more detail.  He understood.  I look forward to helping him out in the near future.        Total time spent with patient was 20 minutes, more than half was counseling.      cc:    Arvind Mack MD    [address] [place] [address]         Again, thank you for allowing me to participate in the care of your patient.        Sincerely,        EMILIANA Aguilar MD

## 2018-11-28 NOTE — PROGRESS NOTES
REFERRING PHYSICIAN:  Arvind Mack MD      PRESENTING COMPLAINT:  Consultation for issues with right index finger.      HISTORY OF PRESENT ILLNESS:  Mr. Whitley is 68 years old.  Many years ago, he jammed his right index finger which resulted in injury to his right index finger.  He cannot recall exactly what injury watched, but ever since then has been unable to really bend his index finger at the PIP joint very well and over the years, it has gotten much worse.  He cannot recall if he had a mallet finger or not.  Currently, he is unable to bend his finger at the PIP joint and has some tenderness around the PIP joint area and has some angulation deformity as well.      PAST MEDICAL HISTORY:  Hyperlipidemia.      PAST SURGICAL HISTORY:  Right carpal tunnel release, hernia repair with mesh, and cholecystectomy.      MEDICATIONS:  Aspirin, ketorolac, sildenafil and simvastatin.      ALLERGIES:  None.      SOCIAL HISTORY:  Does not smoke, occasionally drinks, is retired.        REVIEW OF SYSTEMS:  Denies chest pain, shortness of breath, MI, CVA, DVT and PE.      PHYSICAL EXAMINATION:  Vital signs stable.  He is afebrile, in no obvious distress.  He is 5 feet 3-3/4 inches, 173 pounds, BMI of 30 kg/m2.  On examination of his right index finger, he has deformity in the PIP joint.  He has swelling and a little bit of angular deviation.  He is unable to bend it more than 5 degrees.  He has a slight swan neck deformity with the DIP joint flexed and the PIP joint slightly hyper extended.  Sensation is intact.  MP joint moves normally.  Even with the MP joint flexed, there is no change in the function and flexion of his PIP joint.  Even with it extended, there is no change in his PIP joint function.      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  He has degeneration in a lot of other joints as well.      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  I had a long discussion with the patient about his findings.  Options include potentially doing an arthroplasty of the PIP joint, if that is possible, versus effusion in a more functional position.  I will discuss his case with my hand colleagues to discuss the best way to proceed and then will discuss things with him in more detail.  He understood.  I look forward to helping him out in the near future.        Total time spent with patient was 20 minutes, more than half was counseling.      cc:    Arvind Mack MD    [address] [place] [address]

## 2018-11-29 ENCOUNTER — TELEPHONE (OUTPATIENT)
Dept: ORTHOPEDICS | Facility: CLINIC | Age: 68
End: 2018-11-29

## 2018-11-29 DIAGNOSIS — M20.031 SWAN-NECK DEFORMITY OF FINGER OF RIGHT HAND: Primary | ICD-10-CM

## 2018-11-29 RX ORDER — CEFAZOLIN SODIUM 1 G/50ML
1 INJECTION, SOLUTION INTRAVENOUS SEE ADMIN INSTRUCTIONS
Status: CANCELLED | OUTPATIENT
Start: 2018-11-29

## 2018-11-29 RX ORDER — CEFAZOLIN SODIUM 2 G/50ML
2 SOLUTION INTRAVENOUS
Status: CANCELLED | OUTPATIENT
Start: 2018-11-29

## 2018-12-18 NOTE — TELEPHONE ENCOUNTER
Procedure: Right index finger fusion  [place] [facility]  Length: 90 minute(s)  Surgeon: Lauren CRAWLEY  Anesthesia: General  BMI: There is no height or weight on file to calculate BMI. (If over 43 for general or 45 for MAC cannot be scheduled at [facility])   Pre-op Appointments needed: H&P within 30 days of surgery  Post-op appointments needed: 2 weeks provider only with hand therapy to follow   needed:  No  Surgery packet/instructions given to patient?  to be mailed    Alireza Langston RN

## 2018-12-18 NOTE — TELEPHONE ENCOUNTER
Message left for the patient to call back to get surgery scheduled.  Maddison Horn, Surgery Scheduling Coordinator

## 2018-12-21 NOTE — TELEPHONE ENCOUNTER
Date Scheduled: 2-8-19  Facility: [facility]  Surgeon: Dr. Aguilar   Post-op appointment scheduled:   Next 5 appointments (look out 90 days)    Feb 19, 2019  1:00 PM CST  Nurse Only with MG NURSE ONLY ORTHO  [facility] ([facility]) [id number] [place] [id number] [phone]        Hand Therapy to follow at 1:30   scheduled?: No  Surgery packet/instructions confirmed received?  No  Special Considerations:   Maddison Horn, Surgery Scheduling Coordinator

## 2019-01-11 ENCOUNTER — TELEPHONE (OUTPATIENT)
Dept: SURGERY | Facility: CLINIC | Age: 69
End: 2019-01-11

## 2019-01-11 NOTE — TELEPHONE ENCOUNTER
Called and talked with patient, relayed that yes he does need a pre-op physical, provider is within [place].  Also let him know that we will send out the surgery packet for him to review.  He had no other questions.  Maddie Arzola RN

## 2019-01-11 NOTE — PATIENT INSTRUCTIONS
[facility]  [address] [place] [address]  Phone [phone]  Fax [phone]    SURGICAL INFORMATION & INSTRUCTIONS  Dr. Rosalio Aguilar  Name of Surgery: Right index finger fusion    Date of Surgery: 2/8/19    If you need to reschedule/schedule your surgery, please contact Maddison, our surgery scheduler at [place], at [phone].    Arrival Time: 9:45am    Time of Surgery:  10:45am    Please arrive early so that we can prepare you for surgery. If you arrive later than your scheduled arrival time, your surgery may be cancelled.  Please note that scheduled times may change, but you will always be notified if there is a change.       Location of Surgery:     ? [facility]  [address] [place] [address]  2nd floor check-in  Phone [phone]  Fax [phone]  [email or web address]    Prior to surgery    ? Call your insurance company  Ask if you need pre-approval for your surgery.  If pre-approval is needed, please call our surgery scheduler for assistance with the pre-approval process.   If you do not have insurance, please let us know.     ? Schedule an appointment with a Primary Care Provider for a Pre-Op Physical.  This should be done within 30 days of surgery  If you do not have a primary care provider, you may call [facility] at [phone], for an appointment.  Please have your office note and any labs or tests faxed to the facility where you are having surgery. Please be sure to request a copy of your pre-op physical and bring it with you on the day of surgery.      Tell your provider if you have any of the following:   - IF you have a pacemaker or an ICD (implanted cardiac defibrillator). If you do, please bring the ID card with you on the day of surgery  - IF you're a smoker. People who smoke have a higher risk of infection after surgery. Ask your provider how you can quit smoking.  - If you have diabetes, work with your  provider to control your blood sugar. If its not well-controlled, we may need to delay surgery (or you may have problems healing afterward).  - If your surgeon asks you to see your dentist: You'll need to complete any dental work before surgery. Your dentist must send a letter to your surgery center saying it's ok to do the surgery.    ? Pre-Op Phone Call  You will receive a pre-op phone call 1-3 days before surgery to review your eating and drinking restrictions, review medications, and confirm surgery times.      ? 7-10 days BEFORE surgery  ? Stop taking aspirin, Plavix or aspirin products 10 days before surgery or as directed by your doctor.  ? Stop taking non-steroidal anti-inflammatory medications (naproxen/Aleve, ibuprofen/Advil/Motrin, celecoxib/Celebrex, meloxicam/Mobic) 1 week before surgery or as directed by your surgeon.  This will reduce the risk of bleeding during surgery.  ? Stop taking fish oil (Omega-3-fatty acid) 1 week before surgery.  ? It is OK to take acetaminophen (Tylenol) up until 2 hours prior to surgery.  ? Take prescription medications as directed by your doctor.  Discuss which medications to take or hold prior to your surgery, with your primary care doctor.    ? If you have diabetes, ask your primary care doctor or endocrinologist how you should take your medication(s).    ? 24 hours BEFORE surgery  Stop drinking alcohol (beer, wine, liquor) at least 24-hours before and after your surgery.     ? Evening BEFORE surgery  - You may eat a normal meal the night before surgery, but eat nothing after midnight.     - Take a shower - to help wash away bacteria (germs) from your skin.  It s normal to have bacteria on your skin and skin protects us from these germs.  When you have surgery, we cut the skin.  Sometimes germs get into the cuts and cause infection (illness caused by germs).  By following the showering instructions and using the special soap, you will lower the number of germs on your  skin.  This decreases your chance of an infection.    - Buy or get 8 ounces of antiseptic surgical soap called 4% CHG.  Common brands of this soap are Hibiclens and Exidine.    - You can find it this soap at your local pharmacy, clinic or retail store.  If you have trouble finding it, ask your pharmacist to help you find the right substitute.    - Do not shave within 12 inches of your incision (surgical cut) area for at least 3 days before surgery.  Shaving can make small cuts in the skin. This puts you at a higher risk of infection.    Items you will need for each shower:   - Newly washed towel  - 4 ounces of one of the recommended soaps    Follow these instructions, the evening before surgery  - Wash your hair and body with your regular shampoo and soap. Make sure you rinse the shampoo and soap from your hair and body.  - Using clean hands, apply about 2 ounces of soap gently on your skin from the neck to your toes. Use on your groin area last. Do not use this soap on your face or head. If you get any soap in your eyes, ears or mouth, rinse right away.  - Once the soap has been on your skin for at least 1 minute, rinse off completely and repeat washing with the surgical soap one more time.  - Rinse well and dry off using a clean towel.  If you feel any tingling, itching or other irritation, rinse right away. It is normal to feel some coolness on the skin after using the antiseptic soap. Your skin may feel a bit dry after the shower, but do not use any lotions, creams or moisturizers. Do not use hair spray or other products in your hair.  - Dress in freshly washed clothes or pajamas. Use fresh pillowcases and sheets on your bed.    ? Day of Surgery  - You may drink clear liquids up to 2 hours before surgery or as directed by your surgeon.  Clear liquids include: Water, Pedialyte, Gatorade, apple juice and liquids you can read through. Please avoid liquids that are red or orange in color.   - Do NOT drink: milk,  coffee, liquids that have pulp, orange juice, and lemonade or tomato juice.   - Do NOT chew gum, chew tobacco or suck on hard candy.    - Take another shower          Follow these instructions:      - Wash your hair and body with your regular shampoo and soap. Make sure you rinse the shampoo and soap from your hair and body.  - Using clean hands, apply about 2 ounces of soap gently on your skin from the neck to your toes. Use on your groin area last. Do not use this soap on your face or head. If you get any soap in your eyes, ears or mouth, rinse right away.  - Once the soap has been on your skin for at least 1 minute, rinse off completely and repeat washing with the surgical soap one more time.  - Rinse well and dry off using a clean towel.  If you feel any tingling, itching or other irritation, rinse right away. It is normal to feel some coolness on the skin after using the antiseptic soap. Your skin may feel a bit dry after the shower, but do not use any lotions, creams or moisturizers. Do not use hair spray or other products in your hair.  - Dress in freshly washed clothes.  - Do not wear deodorant, cologne, lotion, makeup, nail polish or jewelry to surgery.    ? If there is any change in your health PRIOR to your surgery, please contact your surgeon's office.  Such as a fever, body aches, fatigue, any flu-like symptoms, rash, or any new injury to related body part.    ? Arrange for someone to drive you home after surgery.    will need to be a responsible adult (18 years or older) that will provide transportation to and from surgery and stay in the waiting room during your surgery. You may not drive yourself or take public transportation to and from surgery.    ? Arrange for someone to stay with you for 24 hours after you go home.   This person must be a responsible adult (18 years or older).    ? Bring these items to the hospital/surgery center:   ? Insurance card(s)  ? Money for parking and co-pays, if  needed  ? A list of all the medications you take, including vitamins, minerals, herbs and over the counter medications.    ? A copy of your Advance Health Care Directive, if you have one.  This tells us what treatment(s) you would or would not want, and who would make health care decisions, if you could no longer speak for yourself.    ? A case for glasses, contact lenses, hearing aids or dentures.   ? Your inhaler or CPAP machine, if you use these at home    ? Leave extra cash, jewelry and other valuables at home.       ? Other information:   Sleep Apnea: Let your nurse know if you have a history of sleep apnea, only if you are having surgery at the [facility].    When you arrive for surgery  When you get to the surgery center/hospital, you will:  - Check in. If you are under age 18, you must be with a parent or legal guardian.  - Sign consent forms, if you haven t already. These forms state that you know the risks and benefits of surgery. When you sign the forms, you give us permission to do the surgery. Do not sign them unless you understand what will happen during and after your surgery. If you have any questions about your surgery, ask to speak with your doctor before you sign the forms. If you don t understand the answers, ask again.  - Receive a copy of the Patient s Bill of Rights. If you do not receive a copy, please ask for one.  - Change into hospital clothes. Your belongings will be placed in a bag. We will return them to you after surgery.  - Meet with the anesthesia provider. He or she will tell you what kind of anesthesia (medicine) will be used to keep you comfortable during surgery.  - Remember: it s okay to remind doctors and nurses to wash their hands before touching you.  - In most cases, your surgeon will use a marker to write his or her initials on the surgery site. This ensures that the exact site is operated on.  - For safety reasons, we will ask you the same questions many  times. For example, we may ask your name and birth date over and over again.  - Friends and family can stay with you until it s time for surgery. While you re in surgery, they will be in the waiting area. Please note that cell phones are not allowed in some patient care areas.  - If you have questions about what will happen in the operating room, talk to your care team.  - You will meet with an anesthesiologist, before your surgery.  He or she may reference types of anesthesia commonly used for surgeries:   o General:  This involves the use of an IV for injection of medication and anesthesia. You are put into a sleep and have a breathing tube to assist you with breathing.  o Sedation:  You are asleep, but not so deply that you need a breathing tube.   o Local or Regional: a nerve is injected to numb the surgical area.  o Spinal: you are numbed from the waist down with medicine injected into your back.  o Femoral Nerve Block:  Anesthesia injected into the groin of leg which you are having surgery on.      After surgery  We will move you to a recovery room, where we will watch you closely. If you have any pain or discomfort, tell your nurse. He or she will try to make you comfortable.    - If you are staying overnight, we will move you to your hospital room after you are awake.  - If you are going home, we will move you to another room. Friends and family may be able to join you. The length of time you spend in recovery depend on the type of medicine you received, your medical condition, the type of surgery you had, or your response to the anesthesia given during your procedure.  - When you are discharged from the recovery room, the nurses will review instructions with you and your caregiver.  - Please wash your hands every time you touch the wound or change bandages or dressings.  - Do not submerge the wound in water.  You may not use a bathtub or hot tub until the wound is closed. The wait time frame is generally  2-3 weeks, but any open area can be a source of incoming bacteria, so it is better to be on the safe side and avoid water submersion until your wound is fully healed.  Keep all dressings clean and dry.   - If you had surgery on your arm or leg, elevate it as much as possible to help reduce swelling.  - You may put ice on the surgical area for comfort, keeping ice on area for up to 20 minutes then off for 40 minutes.  You may do this the first few days after surgery to help reduce pain and swelling.   - Many surgical wounds will have small white strips of tape on them called steri-strips. These are to help support the incision and surrounding skin. Do not remove these. The edges will curl and fall off on their own, typically within 7-10 days with normal showering and hygiene.   - Drink at least 8-10 glasses of liquid each day to help your body heal.  - Keep your lungs clear by coughing and taking deep breaths every couple hours.  This is especially important the first 48 hours after surgery.    - Notify your doctor if you have any of the following:   o Fever of 101 F or higher  o Numbness and/or tingling  o Increased pain, redness or swelling  o Drainage from wound  o Prolonged or uncontrolled bleeding  o Difficulty with movement    Follow-up Appointments, in Clinic  If you don't already have an appointment scheduled, please call to set up an appointment at [phone].      ? Nurse Only appointment (2 weeks post op)  ? Post-Op appointments with provider (6 weeks post op)  ? Hand Therapy appointment ([phone])    Dealing with pain  A nurse will check your comfort level often during your stay. He or she will work with you to manage your pain.  It s expected that you will have pain after surgery.  Our goal is to reduce or minimize pain by:   - Remember pain is real. There are many ways to control pain. We can help you decide what works best for you.  - Ask for pain medicine when you need it.  Don t try to  tough  it out --this can make you feel worse. Always take your medicine as ordered.  - Medicine doesn t work the same for everyone. If your medicine isn t working, tell your nurse. There may be other medicines or treatments we can try.  - Medication Refills.  If you need refills on your pain medication, please call the clinic as soon as possible.  It may take 72-business hours to obtain a refill.  Refills must be picked up at check-in 2, [facility] or mailed to a pharmacy of your choice.    - It is expected that you will wean off the pain medications in a timely manner.   - Constipation is a common side effect of pain medication, decreased activity and anesthesia from surgery.  Take a stool softener as prescribed by your doctor at the time of discharge.  You may also use over the counter medications as needed.  Be sure to increase your fiber (fruits and vegetables) and your water intake.      Please call the clinic at [phone], if you experience any problems or have questions.  If you are having an emergency, always call [phone] or seek immediate evaluation at the Emergency Room.    Thank you for selecting the [facility] for your care!  ---------------------------------------------            Thanks for coming today.  Ortho/[facility] Medicine Clinic  [address] [place] [address]    To schedule future appointments in Ortho Clinic, you may call [phone].    To schedule ordered imaging by your provider:   Call Central Imaging [phone]    To schedule an injection ordered by your provider:  Call Central Imaging Injection scheduling line: [phone]  [email or web address] available online at:  [email or web address]    Please call if any further questions or concerns ([phone]).  Clinic hours 8 am to 5 pm.    Return to clinic (call) if symptoms worsen or fail to improve.

## 2019-01-11 NOTE — PROGRESS NOTES
[facility]  [id number] [place] [id number]  [phone]  Dept: [phone]    PRE-OP EVALUATION:  Today's date: 1/15/2019    Kameron Whitley (: 1950) presents for pre-operative evaluation assessment as requested by Dr. Pink.  He requires evaluation and anesthesia risk assessment prior to undergoing surgery/procedure for treatment of right index finger intervention .    Fax number for surgical facility: _________________________________  Primary Physician: Nicola Andrews  Type of Anesthesia Anticipated: to be determined    Patient has a Health Care Directive or Living Will:  NO    Preop Questions 1/15/2019   Who is doing your surgery? giovana palacios   What are you having done? [facility]   Date of Surgery/Procedure: 2019   Facility or Hospital where procedure/surgery will be performed: [facility]   1.  Do you have a history of Heart attack, stroke, stent, coronary bypass surgery, or other heart surgery? No   2.  Do you ever have any pain or discomfort in your chest? No   3.  Do you have a history of  Heart Failure? No   4.   Are you troubled by shortness of breath when:  walking on a level surface, or up a slight hill, or at night? No   5.  Do you currently have a cold, bronchitis or other respiratory infection? No   6.  Do you have a cough, shortness of breath, or wheezing? No   7.  Do you sometimes get pains in the calves of your legs when you walk? No   8. Do you or anyone in your family have previous history of blood clots? No   9.  Do you or does anyone in your family have a serious bleeding problem such as prolonged bleeding following surgeries or cuts? No   10. Have you ever had problems with anemia or been told to take iron pills? No   11. Have you had any abnormal blood loss such as black, tarry or bloody stools? No   12. Have you ever had a blood transfusion? No   13. Have you or any of your relatives ever had problems  with anesthesia? No   14. Do you have sleep apnea, excessive snoring or daytime drowsiness? No   15. Do you have any prosthetic heart valves? No   16. Do you have prosthetic joints? No         HPI:     HPI related to upcoming procedure:       See problem list for active medical problems.  Problems all longstanding and stable, except as noted/documented.  See ROS for pertinent symptoms related to these conditions.                                                                                                                                                          .    MEDICAL HISTORY:     Patient Active Problem List    Diagnosis Date Noted     Stetson-neck deformity of finger 11/27/2018     Priority: Medium     Hyperlipidemia LDL goal <130 11/09/2017     Priority: Medium     Elevated blood pressure reading without diagnosis of hypertension 11/09/2017     Priority: Medium     Cholelithiasis with acute cholecystitis without biliary obstruction 10/05/2016     Priority: Medium     Erectile dysfunction due to diseases classified elsewhere 09/28/2016     Priority: Medium     ED (erectile dysfunction) 01/06/2014     Priority: Medium     Groin mass 04/08/2013     Priority: Medium     CARDIOVASCULAR SCREENING; LDL GOAL LESS THAN 160 03/30/2012     Priority: Medium     Impacted cerumen 03/30/2012     Priority: Medium     Disturbance of skin sensation 12/29/2005     Priority: Medium      Past Medical History:   Diagnosis Date     Closed fracture of clavicle     Age 2 - Right Clavicle     Past Surgical History:   Procedure Laterality Date     CARPAL TUNNEL RELEASE RT/LT  1/2006    right hand     COLONOSCOPY  4/2009    Normal-f/u 10 years (At [facility])     LAPAROSCOPIC CHOLECYSTECTOMY N/A 10/5/2016    Procedure: LAPAROSCOPIC CHOLECYSTECTOMY;  Surgeon: Nikita Anderson MD;  Location: [facility] OR     LAPAROSCOPIC HERNIORRHAPHY INGUINAL BILATERAL  1/10/2014    Procedure: LAPAROSCOPIC HERNIORRHAPHY INGUINAL BILATERAL;  Laparoscopic Bilateral  Inguinal Hernia Repair;  Surgeon: Nikita Anderson MD;  Location: [facility] OR     Current Outpatient Medications   Medication Sig Dispense Refill     aspirin 81 MG EC tablet Take 1 tablet (81 mg) by mouth daily 90 tablet 3     ketorolac (ACULAR) 0.5 % ophthalmic solution        LOW-DOSE ASPIRIN PO Take  by mouth.       ofloxacin (OCUFLOX) 0.3 % ophthalmic solution        sildenafil (VIAGRA) 100 MG tablet Take 1 tablet (100 mg) by mouth daily as needed (erectile dysfunction) Take 30 min to 4 hours before intercourse.  Never use with nitroglycerin, terazosin or doxazosin. 8 tablet 11     simvastatin (ZOCOR) 20 MG tablet Take 1 tablet (20 mg) by mouth At Bedtime 90 tablet 1     OTC products: None, except as noted above    No Known Allergies   Latex Allergy: NO    Social History     Tobacco Use     Smoking status: Former Smoker     Types: Cigarettes     Smokeless tobacco: Never Used   Substance Use Topics     Alcohol use: Yes     Alcohol/week: 0.6 oz     Types: 1 Cans of beer per week     Comment: occ     History   Drug Use No       REVIEW OF SYSTEMS:   CONSTITUTIONAL: NEGATIVE for fever, chills, change in weight  INTEGUMENTARY/SKIN: NEGATIVE for worrisome rashes, moles or lesions  EYES: NEGATIVE for vision changes or irritation  ENT/MOUTH: NEGATIVE for ear, mouth and throat problems  RESP: NEGATIVE for significant cough or SOB  BREAST: NEGATIVE for masses, tenderness or discharge  CV: NEGATIVE for chest pain, palpitations or peripheral edema  GI: NEGATIVE for nausea, abdominal pain, heartburn, or change in bowel habits  : NEGATIVE for frequency, dysuria, or hematuria  MUSCULOSKELETAL: NEGATIVE for significant arthralgias or myalgia  NEURO: NEGATIVE for weakness, dizziness or paresthesias  ENDOCRINE: NEGATIVE for temperature intolerance, skin/hair changes  HEME: NEGATIVE for bleeding problems  PSYCHIATRIC: NEGATIVE for changes in mood or affect    EXAM:   /78   Pulse 55   Temp 97.9  F (36.6  C) (Temporal)   Resp  "16   Ht 1.61 m (5' 3.39\")   Wt 78.9 kg (174 lb)   SpO2 98%   BMI 30.45 kg/m      GENERAL APPEARANCE: healthy, alert and no distress     EYES: EOMI,  PERRL     HENT: ear canals and TM's normal and nose and mouth without ulcers or lesions     NECK: no adenopathy, no asymmetry, masses, or scars and thyroid normal to palpation     RESP: lungs clear to auscultation - no rales, rhonchi or wheezes     CV: regular rates and rhythm, normal S1 S2, no S3 or S4 and no murmur, click or rub     ABDOMEN:  soft, nontender, no HSM or masses and bowel sounds normal     MS: WNL with exception of injured finger     SKIN: no suspicious lesions or rashes     NEURO: Normal strength and tone, sensory exam grossly normal, mentation intact and speech normal     PSYCH: mentation appears normal. and affect normal/bright     LYMPHATICS: No cervical adenopathy    DIAGNOSTICS:     EKG: sinus bradycardia, normal axis, normal intervals, no acute ST/T changes c/w ischemia, no LVH by voltage criteria, unchanged from previous tracings  Labs Drawn and in Process:   Unresulted Labs Ordered in the Past 30 Days of this Admission     No orders found from 11/16/2018 to 1/16/2019.          Recent Labs   Lab Test 11/16/18  0946 11/09/17  0918 10/04/16  2215   HGB 15.7 15.1 15.6    174 212   NA  --  140 140   POTASSIUM  --  4.1 4.0   CR  --  0.90 1.14        IMPRESSION:   Reason for surgery/procedure: right index finger intervention  Diagnosis/reason for consult: Anesthesia/surgical clearance.    The proposed surgical procedure is considered INTERMEDIATE risk.    REVISED CARDIAC RISK INDEX  The patient has the following serious cardiovascular risks for perioperative complications such as (MI, PE, VFib and 3  AV Block):  No serious cardiac risks  INTERPRETATION: 1 risks: Class II (low risk - 0.9% complication rate)    The patient has the following additional risks for perioperative complications:  No identified additional risks  The 10-year ASCVD " risk score (Angy PRICE Jr., et al., 2013) is: 13.7%    Values used to calculate the score:      Age: 68 years      Sex: Male      Is Non- : No      Diabetic: No      Tobacco smoker: No      Systolic Blood Pressure: 122 mmHg      Is BP treated: No      HDL Cholesterol: 78 mg/dL      Total Cholesterol: 198 mg/dL      ICD-10-CM    1. Preop general physical exam Z01.818    2. Patterson-neck deformity of finger, right index finger M20.039 EKG 12-lead complete w/read - Clinics   3. Screen for colon cancer Z12.11 EKG 12-lead complete w/read - Clinics       RECOMMENDATIONS:     --Patient is to take all scheduled medications on the day of surgery EXCEPT for modifications listed below.    Anticoagulant or Antiplatelet Medication Use  ASPIRIN: Discontinue ASA 7-10 days prior to procedure to reduce bleeding risk.  It should be resumed post-operatively.        Approval given to proceed with proposed procedure, without further diagnostic evaluation.  Patient was well the day of the exam.  Given that his surgery is over 2 weeks away and the time of the year, he may very well develop an URI prior to surgery.  Careful reexamination prior to anesthesia is encouraged.       Signed Electronically by: CHRIS Pascual PA-C    Copy of this evaluation report is provided to requesting physician.    [facility] Preop Guidelines    Revised Cardiac Risk Index

## 2019-01-13 ENCOUNTER — TRANSFERRED RECORDS (OUTPATIENT)
Dept: HEALTH INFORMATION MANAGEMENT | Facility: CLINIC | Age: 69
End: 2019-01-13

## 2019-01-13 LAB — HEMOCCULT STL QL IA: NEGATIVE

## 2019-01-15 ENCOUNTER — OFFICE VISIT (OUTPATIENT)
Dept: FAMILY MEDICINE | Facility: OTHER | Age: 69
End: 2019-01-15
Payer: COMMERCIAL

## 2019-01-15 VITALS
OXYGEN SATURATION: 98 % | TEMPERATURE: 97.9 F | DIASTOLIC BLOOD PRESSURE: 78 MMHG | RESPIRATION RATE: 16 BRPM | SYSTOLIC BLOOD PRESSURE: 122 MMHG | BODY MASS INDEX: 30.83 KG/M2 | HEIGHT: 63 IN | WEIGHT: 174 LBS | HEART RATE: 55 BPM

## 2019-01-15 DIAGNOSIS — Z12.11 SCREEN FOR COLON CANCER: ICD-10-CM

## 2019-01-15 DIAGNOSIS — Z01.818 PREOP GENERAL PHYSICAL EXAM: Primary | ICD-10-CM

## 2019-01-15 DIAGNOSIS — R00.1 SINUS BRADYCARDIA: ICD-10-CM

## 2019-01-15 DIAGNOSIS — E78.5 HYPERLIPIDEMIA LDL GOAL <130: ICD-10-CM

## 2019-01-15 DIAGNOSIS — M20.039 SWAN-NECK DEFORMITY OF FINGER, UNSPECIFIED LATERALITY: ICD-10-CM

## 2019-01-15 LAB
ALBUMIN SERPL-MCNC: 3.8 G/DL (ref 3.4–5)
ALP SERPL-CCNC: 72 U/L (ref 40–150)
ALT SERPL W P-5'-P-CCNC: 33 U/L (ref 0–70)
ANION GAP SERPL CALCULATED.3IONS-SCNC: 4 MMOL/L (ref 3–14)
AST SERPL W P-5'-P-CCNC: 29 U/L (ref 0–45)
BASOPHILS # BLD AUTO: 0 10E9/L (ref 0–0.2)
BASOPHILS NFR BLD AUTO: 0.2 %
BILIRUB SERPL-MCNC: 0.8 MG/DL (ref 0.2–1.3)
BUN SERPL-MCNC: 18 MG/DL (ref 7–30)
CALCIUM SERPL-MCNC: 8.4 MG/DL (ref 8.5–10.1)
CHLORIDE SERPL-SCNC: 104 MMOL/L (ref 94–109)
CO2 SERPL-SCNC: 31 MMOL/L (ref 20–32)
CREAT SERPL-MCNC: 1.02 MG/DL (ref 0.66–1.25)
DIFFERENTIAL METHOD BLD: ABNORMAL
EOSINOPHIL # BLD AUTO: 0.1 10E9/L (ref 0–0.7)
EOSINOPHIL NFR BLD AUTO: 1.6 %
ERYTHROCYTE [DISTWIDTH] IN BLOOD BY AUTOMATED COUNT: 12.4 % (ref 10–15)
GFR SERPL CREATININE-BSD FRML MDRD: 74 ML/MIN/{1.73_M2}
GLUCOSE SERPL-MCNC: 85 MG/DL (ref 70–99)
HCT VFR BLD AUTO: 45.1 % (ref 40–53)
HGB BLD-MCNC: 15.5 G/DL (ref 13.3–17.7)
LYMPHOCYTES # BLD AUTO: 2.8 10E9/L (ref 0.8–5.3)
LYMPHOCYTES NFR BLD AUTO: 49.7 %
MCH RBC QN AUTO: 33.2 PG (ref 26.5–33)
MCHC RBC AUTO-ENTMCNC: 34.4 G/DL (ref 31.5–36.5)
MCV RBC AUTO: 97 FL (ref 78–100)
MONOCYTES # BLD AUTO: 0.6 10E9/L (ref 0–1.3)
MONOCYTES NFR BLD AUTO: 10.8 %
NEUTROPHILS # BLD AUTO: 2.1 10E9/L (ref 1.6–8.3)
NEUTROPHILS NFR BLD AUTO: 37.7 %
PLATELET # BLD AUTO: 171 10E9/L (ref 150–450)
POTASSIUM SERPL-SCNC: 4.5 MMOL/L (ref 3.4–5.3)
PROT SERPL-MCNC: 7.1 G/DL (ref 6.8–8.8)
RBC # BLD AUTO: 4.67 10E12/L (ref 4.4–5.9)
SODIUM SERPL-SCNC: 139 MMOL/L (ref 133–144)
WBC # BLD AUTO: 5.6 10E9/L (ref 4–11)

## 2019-01-15 PROCEDURE — 85025 COMPLETE CBC W/AUTO DIFF WBC: CPT | Performed by: PHYSICIAN ASSISTANT

## 2019-01-15 PROCEDURE — 99214 OFFICE O/P EST MOD 30 MIN: CPT | Performed by: PHYSICIAN ASSISTANT

## 2019-01-15 PROCEDURE — 80053 COMPREHEN METABOLIC PANEL: CPT | Performed by: PHYSICIAN ASSISTANT

## 2019-01-15 PROCEDURE — 36415 COLL VENOUS BLD VENIPUNCTURE: CPT | Performed by: PHYSICIAN ASSISTANT

## 2019-01-15 PROCEDURE — 93000 ELECTROCARDIOGRAM COMPLETE: CPT | Performed by: PHYSICIAN ASSISTANT

## 2019-01-15 ASSESSMENT — MIFFLIN-ST. JEOR: SCORE: 1450.51

## 2019-01-16 ENCOUNTER — TELEPHONE (OUTPATIENT)
Dept: FAMILY MEDICINE | Facility: OTHER | Age: 69
End: 2019-01-16

## 2019-01-16 NOTE — TELEPHONE ENCOUNTER
Date of colonoscopy/EGD: 2/25  Surgeon: Dr. Nunes  Prep:Miralax  Packet:Colonoscopy/EGD instructions mailed to patient's home address.   Date: 1/16/2019      Surgery Scheduler

## 2019-02-08 ENCOUNTER — ANCILLARY PROCEDURE (OUTPATIENT)
Dept: GENERAL RADIOLOGY | Facility: CLINIC | Age: 69
End: 2019-02-08
Attending: PLASTIC SURGERY
Payer: COMMERCIAL

## 2019-02-08 ENCOUNTER — ANESTHESIA EVENT (OUTPATIENT)
Dept: SURGERY | Facility: AMBULATORY SURGERY CENTER | Age: 69
End: 2019-02-08

## 2019-02-08 ENCOUNTER — ANESTHESIA (OUTPATIENT)
Dept: SURGERY | Facility: AMBULATORY SURGERY CENTER | Age: 69
End: 2019-02-08

## 2019-02-08 ENCOUNTER — HOSPITAL ENCOUNTER (OUTPATIENT)
Facility: AMBULATORY SURGERY CENTER | Age: 69
Discharge: HOME | End: 2019-02-08
Attending: PLASTIC SURGERY | Admitting: PLASTIC SURGERY
Payer: COMMERCIAL

## 2019-02-08 VITALS
TEMPERATURE: 97.6 F | SYSTOLIC BLOOD PRESSURE: 117 MMHG | RESPIRATION RATE: 11 BRPM | DIASTOLIC BLOOD PRESSURE: 77 MMHG | OXYGEN SATURATION: 97 % | HEART RATE: 56 BPM

## 2019-02-08 DIAGNOSIS — M19.049 ARTHRITIS OF FINGER: Primary | ICD-10-CM

## 2019-02-08 DIAGNOSIS — M79.644 FINGER PAIN, RIGHT: ICD-10-CM

## 2019-02-08 PROCEDURE — 88311 DECALCIFY TISSUE: CPT | Performed by: PLASTIC SURGERY

## 2019-02-08 PROCEDURE — G8916 PT W IV AB GIVEN ON TIME: HCPCS

## 2019-02-08 PROCEDURE — 88304 TISSUE EXAM BY PATHOLOGIST: CPT | Performed by: PLASTIC SURGERY

## 2019-02-08 PROCEDURE — 26860 FUSION OF FINGER JOINT: CPT | Mod: F6 | Performed by: PLASTIC SURGERY

## 2019-02-08 PROCEDURE — G8907 PT DOC NO EVENTS ON DISCHARG: HCPCS

## 2019-02-08 PROCEDURE — 26860 FUSION OF FINGER JOINT: CPT | Mod: F6

## 2019-02-08 RX ORDER — ONDANSETRON 4 MG/1
4-8 TABLET, ORALLY DISINTEGRATING ORAL EVERY 8 HOURS PRN
Qty: 4 TABLET | Refills: 0 | Status: SHIPPED | OUTPATIENT
Start: 2019-02-08 | End: 2019-02-19

## 2019-02-08 RX ORDER — FENTANYL CITRATE 50 UG/ML
25-50 INJECTION, SOLUTION INTRAMUSCULAR; INTRAVENOUS EVERY 5 MIN PRN
Status: DISCONTINUED | OUTPATIENT
Start: 2019-02-08 | End: 2019-02-09 | Stop reason: HOSPADM

## 2019-02-08 RX ORDER — LIDOCAINE HYDROCHLORIDE 20 MG/ML
INJECTION, SOLUTION INFILTRATION; PERINEURAL PRN
Status: DISCONTINUED | OUTPATIENT
Start: 2019-02-08 | End: 2019-02-08

## 2019-02-08 RX ORDER — FENTANYL CITRATE 50 UG/ML
INJECTION, SOLUTION INTRAMUSCULAR; INTRAVENOUS PRN
Status: DISCONTINUED | OUTPATIENT
Start: 2019-02-08 | End: 2019-02-08

## 2019-02-08 RX ORDER — ONDANSETRON 4 MG/1
4 TABLET, ORALLY DISINTEGRATING ORAL EVERY 30 MIN PRN
Status: DISCONTINUED | OUTPATIENT
Start: 2019-02-08 | End: 2019-02-09 | Stop reason: HOSPADM

## 2019-02-08 RX ORDER — ONDANSETRON 2 MG/ML
4 INJECTION INTRAMUSCULAR; INTRAVENOUS EVERY 30 MIN PRN
Status: DISCONTINUED | OUTPATIENT
Start: 2019-02-08 | End: 2019-02-09 | Stop reason: HOSPADM

## 2019-02-08 RX ORDER — AMOXICILLIN 250 MG
1-2 CAPSULE ORAL 2 TIMES DAILY
Qty: 30 TABLET | Refills: 0 | Status: SHIPPED | OUTPATIENT
Start: 2019-02-08 | End: 2019-02-19

## 2019-02-08 RX ORDER — PROPOFOL 10 MG/ML
INJECTION, EMULSION INTRAVENOUS PRN
Status: DISCONTINUED | OUTPATIENT
Start: 2019-02-08 | End: 2019-02-08

## 2019-02-08 RX ORDER — OXYCODONE HYDROCHLORIDE 5 MG/1
5-10 TABLET ORAL EVERY 6 HOURS PRN
Qty: 15 TABLET | Refills: 0 | Status: SHIPPED | OUTPATIENT
Start: 2019-02-08 | End: 2019-02-19

## 2019-02-08 RX ORDER — CEFAZOLIN SODIUM 2 G/100ML
2 INJECTION, SOLUTION INTRAVENOUS
Status: COMPLETED | OUTPATIENT
Start: 2019-02-08 | End: 2019-02-08

## 2019-02-08 RX ORDER — CEFAZOLIN SODIUM 1 G/3ML
1 INJECTION, POWDER, FOR SOLUTION INTRAMUSCULAR; INTRAVENOUS SEE ADMIN INSTRUCTIONS
Status: DISCONTINUED | OUTPATIENT
Start: 2019-02-08 | End: 2019-02-09 | Stop reason: HOSPADM

## 2019-02-08 RX ORDER — ONDANSETRON 2 MG/ML
INJECTION INTRAMUSCULAR; INTRAVENOUS PRN
Status: DISCONTINUED | OUTPATIENT
Start: 2019-02-08 | End: 2019-02-08

## 2019-02-08 RX ORDER — SODIUM CHLORIDE, SODIUM LACTATE, POTASSIUM CHLORIDE, CALCIUM CHLORIDE 600; 310; 30; 20 MG/100ML; MG/100ML; MG/100ML; MG/100ML
INJECTION, SOLUTION INTRAVENOUS CONTINUOUS
Status: DISCONTINUED | OUTPATIENT
Start: 2019-02-08 | End: 2019-02-09 | Stop reason: HOSPADM

## 2019-02-08 RX ORDER — MEPERIDINE HYDROCHLORIDE 25 MG/ML
12.5 INJECTION INTRAMUSCULAR; INTRAVENOUS; SUBCUTANEOUS
Status: DISCONTINUED | OUTPATIENT
Start: 2019-02-08 | End: 2019-02-09 | Stop reason: HOSPADM

## 2019-02-08 RX ORDER — OXYCODONE HYDROCHLORIDE 5 MG/1
5 TABLET ORAL EVERY 4 HOURS PRN
Status: DISCONTINUED | OUTPATIENT
Start: 2019-02-08 | End: 2019-02-09 | Stop reason: HOSPADM

## 2019-02-08 RX ORDER — NALOXONE HYDROCHLORIDE 0.4 MG/ML
.1-.4 INJECTION, SOLUTION INTRAMUSCULAR; INTRAVENOUS; SUBCUTANEOUS
Status: DISCONTINUED | OUTPATIENT
Start: 2019-02-08 | End: 2019-02-09 | Stop reason: HOSPADM

## 2019-02-08 RX ORDER — GLYCOPYRROLATE 0.2 MG/ML
INJECTION, SOLUTION INTRAMUSCULAR; INTRAVENOUS PRN
Status: DISCONTINUED | OUTPATIENT
Start: 2019-02-08 | End: 2019-02-08

## 2019-02-08 RX ORDER — ACETAMINOPHEN 325 MG/1
975 TABLET ORAL ONCE
Status: COMPLETED | OUTPATIENT
Start: 2019-02-08 | End: 2019-02-08

## 2019-02-08 RX ORDER — LIDOCAINE 40 MG/G
CREAM TOPICAL
Status: DISCONTINUED | OUTPATIENT
Start: 2019-02-08 | End: 2019-02-09 | Stop reason: HOSPADM

## 2019-02-08 RX ADMIN — LIDOCAINE HYDROCHLORIDE 60 MG: 20 INJECTION, SOLUTION INFILTRATION; PERINEURAL at 08:49

## 2019-02-08 RX ADMIN — PROPOFOL 170 MG: 10 INJECTION, EMULSION INTRAVENOUS at 08:49

## 2019-02-08 RX ADMIN — SODIUM CHLORIDE, SODIUM LACTATE, POTASSIUM CHLORIDE, CALCIUM CHLORIDE: 600; 310; 30; 20 INJECTION, SOLUTION INTRAVENOUS at 08:07

## 2019-02-08 RX ADMIN — ACETAMINOPHEN 975 MG: 325 TABLET ORAL at 08:07

## 2019-02-08 RX ADMIN — CEFAZOLIN SODIUM 2 G: 2 INJECTION, SOLUTION INTRAVENOUS at 08:58

## 2019-02-08 RX ADMIN — Medication 200 MCG: at 08:53

## 2019-02-08 RX ADMIN — Medication 100 MCG: at 09:04

## 2019-02-08 RX ADMIN — GLYCOPYRROLATE 0.2 MG: 0.2 INJECTION, SOLUTION INTRAMUSCULAR; INTRAVENOUS at 08:55

## 2019-02-08 RX ADMIN — ONDANSETRON 4 MG: 2 INJECTION INTRAMUSCULAR; INTRAVENOUS at 09:08

## 2019-02-08 RX ADMIN — PROPOFOL 40 MG: 10 INJECTION, EMULSION INTRAVENOUS at 08:51

## 2019-02-08 RX ADMIN — FENTANYL CITRATE 25 MCG: 50 INJECTION, SOLUTION INTRAMUSCULAR; INTRAVENOUS at 08:49

## 2019-02-08 ASSESSMENT — LIFESTYLE VARIABLES: TOBACCO_USE: 1

## 2019-02-08 NOTE — ANESTHESIA POSTPROCEDURE EVALUATION
Anesthesia POST Procedure Evaluation    Patient: Kameron Whitley   MRN:     [id number] Gender:   male   Age:    68 year old :      1950        Preoperative Diagnosis: Arthritis   Procedure(s):  Right index finger fusion   Postop Comments: No value filed.       Anesthesia Type:  General    Reportable Event: NO     PAIN: Uncomplicated   Sign Out status: Comfortable, Well controlled pain     PONV: No PONV   Sign Out status:  No Nausea or Vomiting     Neuro/Psych: Uneventful perioperative course   Sign Out Status: Preoperative baseline; Age appropriate mentation     Airway/Resp.: Uneventful perioperative course   Sign Out Status: Non labored breathing, age appropriate RR; Resp. Status within EXPECTED Parameters     CV: Uneventful perioperative course   Sign Out status: Appropriate BP and perfusion indices; Appropriate HR/Rhythm     Disposition:   Sign Out in:  PACU  Disposition:  Phase II; Home  Recovery Course: Uneventful  Follow-Up: Not required           Last Anesthesia Record Vitals:  CRNA VITALS  2019 0956 - 2019 1056      2019             Pulse:  69    SpO2:  96 %          Last PACU/Preop Vitals:  Vitals:    19 1045 19 1100 19 1115   BP: 113/73 106/76 117/77   Pulse: 65  56   Resp: 12 12 11   Temp:   97.6  F (36.4  C)   SpO2: 96% 93% 97%         Electronically Signed By: Denis Hoffman DO, 2019, 11:57 AM

## 2019-02-08 NOTE — BRIEF OP NOTE
[facility] Brief Operative Note    Pre-operative diagnosis: Arthritis   Post-operative diagnosis As above   Procedure: Procedure(s):  Right index finger fusion   Surgeon(s): Surgeon(s) and Role:     * EMILIANA Aguilar MD - Primary   Estimated blood loss: 5 cc    Specimens: ID Type Source Tests Collected by Time Destination   A : Right index finger PIP joint debridement Tissue Finger SURGICAL PATHOLOGY EXAM EMILIANA Aguilar MD 2/8/2019  9:14 AM       Findings: NA

## 2019-02-08 NOTE — ANESTHESIA PREPROCEDURE EVALUATION
Anesthesia Pre-Procedure Evaluation    Patient: Kameron Whitley   MRN:     [id number] Gender:   male   Age:    68 year old :      1950        Preoperative Diagnosis: Arthritis   Procedure(s):  Right index finger fusion     Past Medical History:   Diagnosis Date     Closed fracture of clavicle     Age 2 - Right Clavicle      Past Surgical History:   Procedure Laterality Date     CARPAL TUNNEL RELEASE RT/LT  2006    right hand     COLONOSCOPY  2009    Normal-f/u 10 years (At [facility])     LAPAROSCOPIC CHOLECYSTECTOMY N/A 10/5/2016    Procedure: LAPAROSCOPIC CHOLECYSTECTOMY;  Surgeon: Nikita Anderson MD;  Location[facility]     LAPAROSCOPIC HERNIORRHAPHY INGUINAL BILATERAL  1/10/2014    Procedure: LAPAROSCOPIC HERNIORRHAPHY INGUINAL BILATERAL;  Laparoscopic Bilateral Inguinal Hernia Repair;  Surgeon: Nikita Anderson MD;  Location: [facility] OR          Anesthesia Evaluation     . Pt has had prior anesthetic. Type: MAC    No history of anesthetic complications          ROS/MED HX    ENT/Pulmonary:     (+)tobacco use, Past use , . .    Neurologic:  - neg neurologic ROS     Cardiovascular:     (+) Dyslipidemia, ----. : . . . :. . Previous cardiac testing date:results:date: results:ECG reviewed date: results:NSR date: results:          METS/Exercise Tolerance:  >4 METS   Hematologic:  - neg hematologic  ROS       Musculoskeletal:   (+) , , other musculoskeletal- Closed fracture of clavicle      GI/Hepatic:     (+) GERD Asymptomatic on medication, cholecystitis/cholelithiasis, Other GI/Hepatic Cholelithiasis with acute cholecystitis without biliary obstruction      Renal/Genitourinary:  - ROS Renal section negative       Endo:  - neg endo ROS       Psychiatric:  - neg psychiatric ROS       Infectious Disease:  - neg infectious disease ROS       Malignancy:      - no malignancy   Other:    - neg other ROS                     PHYSICAL EXAM:   Mental Status/Neuro: A/A/O   Airway: Facies: Feasible  Mallampati:  "I  Mouth/Opening: Full  TM distance: > 6 cm  Neck ROM: Full   Respiratory: Auscultation: CTAB     Resp. Rate: Normal     Resp. Effort: Normal      CV: Rhythm: Regular  Rate: Age appropriate  Heart: Normal Sounds   Comments:      Dental: Normal                  Lab Results   Component Value Date    WBC 5.6 01/15/2019    HGB 15.5 01/15/2019    HCT 45.1 01/15/2019     01/15/2019     01/15/2019    POTASSIUM 4.5 01/15/2019    CHLORIDE 104 01/15/2019    CO2 31 01/15/2019    BUN 18 01/15/2019    CR 1.02 01/15/2019    GLC 85 01/15/2019    AISLINN 8.4 (L) 01/15/2019    ALBUMIN 3.8 01/15/2019    PROTTOTAL 7.1 01/15/2019    ALT 33 01/15/2019    AST 29 01/15/2019    ALKPHOS 72 01/15/2019    BILITOTAL 0.8 01/15/2019    LIPASE 90 10/04/2016       Preop Vitals  BP Readings from Last 3 Encounters:   01/15/19 122/78   11/16/18 126/82   11/09/17 132/80    Pulse Readings from Last 3 Encounters:   01/15/19 55   11/16/18 60   11/09/17 52      Resp Readings from Last 3 Encounters:   01/15/19 16   11/16/18 16   11/09/17 18    SpO2 Readings from Last 3 Encounters:   01/15/19 98%   05/03/17 99%   03/24/17 97%      Temp Readings from Last 1 Encounters:   01/15/19 97.9  F (36.6  C) (Temporal)    Ht Readings from Last 1 Encounters:   01/15/19 1.61 m (5' 3.39\")      Wt Readings from Last 1 Encounters:   01/15/19 78.9 kg (174 lb)    Estimated body mass index is 30.45 kg/m  as calculated from the following:    Height as of 1/15/19: 1.61 m (5' 3.39\").    Weight as of 1/15/19: 78.9 kg (174 lb).     LDA:            Assessment:   ASA SCORE: 2    NPO Status: > 6 hours since completed [employer]   Documentation: H&P complete; Preop Testing complete; Consents complete   Proceeding: Proceed without further delay  Tobacco Use:  NO Active use of Tobacco/UNKNOWN Tobacco use status     Plan:   Anes. Type:  General   Pre-Induction: Acetaminophen PO   Induction:  IV (Standard)   Airway: LMA   Access/Monitoring: PIV   Maintenance: Balanced; Propofol; " Ketamine   Emergence: Procedure Site   Logistics: Same Day Surgery     Postop Pain/Sedation Strategy:  Standard-Options: Opioids PRN; IV Ketorolac; LA by Surgeon     PONV Management:  Adult Risk Factors:, Non-Smoker, Postop Opioids  Prevention: Propofol Infusion; Ondansetron; Dexamethasone     CONSENT: Direct conversation   Plan and risks discussed with: Patient   Blood Products: Consent Deferred (Minimal Blood Loss)                         Denis Hoffman DO

## 2019-02-08 NOTE — ANESTHESIA CARE TRANSFER NOTE
Patient: Kameron Whitley    Procedure(s):  Right index finger fusion    Diagnosis: Arthritis  Diagnosis Additional Information: No value filed.    Anesthesia Type:   General     Note:  Airway :Face Mask  Patient transferred to:PACU  Handoff Report: Identifed the Patient, Identified the Reponsible Provider, Reviewed the pertinent medical history, Discussed the surgical course, Reviewed Intra-OP anesthesia mangement and issues during anesthesia, Set expectations for post-procedure period and Allowed opportunity for questions and acknowledgement of understanding      Vitals: (Last set prior to Anesthesia Care Transfer)    CRNA VITALS  2/8/2019 0956 - 2/8/2019 1034      2/8/2019             Pulse:  69    SpO2:  96 %                Electronically Signed By: EMMY Hunter CRNA  February 8, 2019  10:34 AM

## 2019-02-08 NOTE — DISCHARGE INSTRUCTIONS
PIP FUSION POST-OPERATIVE INSTRUCTIONS    Instructions       Have someone drive you home after surgery and help you at home for 1-2      days.      Get plenty of rest.      Follow balanced diet.      Decreased activity may promote constipation, so you may want to add      more raw fruit to your diet, and be sure to increase fluid intake.      Take pain medication as prescribed. Do not take aspirin or any products      containing aspirin.      Do not drink alcohol when taking pain medications.      Even when not taking pain medications, no alcohol for 3 weeks as it      causes fluid retention.      If you are taking vitamins with iron, resume these as tolerated.      Do not smoke, as smoking delays healing and increases the risk of      complications.    Activities      Do not drive until you are no longer taking any pain medications      (narcotics).      Start walking as soon as possible, this helps to reduce swelling and       lowers the chance of blood clots.      Resume normal activities gradually.      Return to work in 1-2 days, depending upon extent of surgery      No strenuous work or stress on wound for 2-3 weeks.    Incision Care      Keep the splint on until seen in clinic. Elevate hand as much as possible. No heavy activity or lifting > 5 pounds with the affected arm for 4 weeks.       Avoid exposing scars to sun for at least 12 months.      Always use a strong sunblock, if sun exposure is unavoidable (SPF 50 or      greater).      Inspect daily for signs of infection.      No tub soaking, bathing, or swimming while sutures or drains are in place.      Keep area clean and dry for first 24 hours.     What to Expect      Some swelling and bruising.      May have slight bleeding from incision.  Apply 4 x 4 gauze with slight pressure to       control bleeding.      Skin grafts and flaps may take several weeks or months to heal; a support garment or      bandage may be necessary for up to a  year.    Appearance      Final results of surgery may take a year or more.    Follow-Up Care      If external sutures have been used, they will be removed in 5-14 days.    Please note my office will call you 1-2 business days after your procedure to check up on how you're doing and to schedule your post-operative appointment.        When to Call      If you have increased swelling or bruising.      If swelling and redness persist after a few days.      If you have increased redness along the incision.      If you have severe or increased pain not relieved by medication.      If you have any side effects to medications; such as, rash, nausea,      headache, vomiting.      If you have an oral temperature over 100.4 degrees.      If you have any yellowish or greenish drainage from the incisions or      notice a foul odor.      If you have bleeding from the incisions that is difficult to control with      light pressure.      If you have loss of feeling or motion.    For Medical Questions, Please Call:      [phone], Monday - Friday, 8 a.m. - 4:30 p.m.      After hours and on weekends, call Hospital Paging at [phone] and      ask for the Plastic Surgeon on call.        Tylenol was given at 8am.

## 2019-02-09 NOTE — OP NOTE
Procedure Date: 02/08/2019      PREOPERATIVE DIAGNOSIS:  Severe arthritis of the right index finger PIP joint.      POSTOPERATIVE DIAGNOSIS:  Severe arthritis of the right index finger PIP joint.      PROCEDURES:  Right index finger PIP joint fusion.      SURGEON:  Rosalio Aguilar MD      ANESTHESIA:  General anesthesia with LMA.      COMPLICATIONS:  Nil.      DRAINS:  Nil.      SPECIMENS:  Debrided cartilage from the joint.      BLOOD LOSS:  5 mL.      DESCRIPTION OF PROCEDURE:  After informed consent was taken with the patient, the proper site and procedure was certain with him and he was appropriately marked, he was taken to the operating room.  He was placed in a supine position with the knees comfortably flexed with pillows underneath them and pneumo boots placed and running prior to induction of anesthesia.  Preoperative antibiotics were given in the OR.  All pressure points appropriately padded.  General anesthesia was administered without any complications.  Right hand and arm were prepped and draped in standard surgical fashion.  A forearm tourniquet was applied.  I began by first exsanguinated the hand and elevated the tourniquet to 250 mmHg pressure.  I then went ahead and made a curvilinear incision over the radial aspect of the PIP joint area, dissected through the subcutaneous tissues to the extensor tendon, then opened the extensor tendon and divided it, identifying the PIP joint.  The joint was entered and then the collateral ligaments were taken down sharply.  Then I flexed the PIP joint to identify the condylar surfaces.  I used a rongeur to rongeur off the remaining cartilage on each side.  I then used an oscillating saw to freshen up the edges and got flat surfaces such that I could approximate them at about a 20-30 degree angle.  I then, under fluoroscopy, used a double ended K-wire and in a retrograde fashion passed it into the proximal phalanx and then placed the joint to be fused in a position  I liked and then advanced the K-wires through the middle phalanx.  Under fluoroscopy I ensured that the trajectory and location of the K-wire was good.  I used a measure to choose the appropriate length screw; I used a 26 mm screw.  Once I was happy with that, I then used a 2.5 mm compression screw and first used a drill bit to overdrill the entrance point through the proximal phalanx and then it was a self-tapping screw and I screwed over the K-wire and passed the headless compression screw to fuse the joint.  I then, under fluoroscopy, ensured that the screw was in proper position and on physical exam the joint was stable in a slightly flexed position.  I then let the tourniquet down.  There was some minimal superficial bleeding controlled with bipolar cautery.  The extensor tendon was then repaired with a 4-0 Mersilene suture in interrupted fashion.  The skin was closed with 4-0 nylon suture in an interrupted fashion.  I then placed a Xeroform dry dressing and then placed him in a splint, keeping him in the position of function.  He tolerated the procedure well.  All counts were correct at the end of the case.  The patient was extubated and sent to the recovery room in a stable condition.         EMILIANA GUADALUPE MD             D: 2019   T: 2019   MT: EDWIN      Name:     MELINDA HECTOR   MRN:      [id number]        Account:        [id number]   :      1950           Procedure Date: 2019      Document: [id number]

## 2019-02-19 ENCOUNTER — THERAPY VISIT (OUTPATIENT)
Dept: OCCUPATIONAL THERAPY | Facility: CLINIC | Age: 69
End: 2019-02-19
Payer: COMMERCIAL

## 2019-02-19 ENCOUNTER — OFFICE VISIT (OUTPATIENT)
Dept: ORTHOPEDICS | Facility: CLINIC | Age: 69
End: 2019-02-19
Payer: COMMERCIAL

## 2019-02-19 VITALS — SYSTOLIC BLOOD PRESSURE: 131 MMHG | DIASTOLIC BLOOD PRESSURE: 78 MMHG | OXYGEN SATURATION: 95 % | HEART RATE: 67 BPM

## 2019-02-19 DIAGNOSIS — M79.644 PAIN OF FINGER OF RIGHT HAND: ICD-10-CM

## 2019-02-19 DIAGNOSIS — M20.031 SWAN-NECK DEFORMITY OF FINGER OF RIGHT HAND: Primary | ICD-10-CM

## 2019-02-19 DIAGNOSIS — M19.90 ARTHRITIS: ICD-10-CM

## 2019-02-19 PROCEDURE — 99024 POSTOP FOLLOW-UP VISIT: CPT | Performed by: PLASTIC SURGERY

## 2019-02-19 PROCEDURE — G8985 CARRY GOAL STATUS: HCPCS | Mod: GO | Performed by: OCCUPATIONAL THERAPIST

## 2019-02-19 PROCEDURE — 97760 ORTHOTIC MGMT&TRAING 1ST ENC: CPT | Mod: GO | Performed by: OCCUPATIONAL THERAPIST

## 2019-02-19 PROCEDURE — 97165 OT EVAL LOW COMPLEX 30 MIN: CPT | Mod: GO | Performed by: OCCUPATIONAL THERAPIST

## 2019-02-19 PROCEDURE — G8984 CARRY CURRENT STATUS: HCPCS | Mod: GO | Performed by: OCCUPATIONAL THERAPIST

## 2019-02-19 PROCEDURE — 97110 THERAPEUTIC EXERCISES: CPT | Mod: GO | Performed by: OCCUPATIONAL THERAPIST

## 2019-02-19 ASSESSMENT — PAIN SCALES - GENERAL: PAINLEVEL: NO PAIN (1)

## 2019-02-19 NOTE — LETTER
2/19/2019         RE: Kameron Whitley  [address] [place] [address]        Dear Colleague,    Thank you for referring your patient, Kameron Whitley, to the [facility]. Please see a copy of my visit note below.    PRESENTING COMPLAINT:  Postoperative visit, status post right index finger PIP joint arthritis with PIP joint fusion with a cannulated screw done on 02/08/2019.      HISTORY OF PRESENTING COMPLAINT:  Mr. Whitley is 68 years old.  He is just under 2 weeks out from surgery, overall doing well, no major issues, minimal pain.      PHYSICAL EXAMINATION:  Vital signs are stable.  He is afebrile, in no obvious distress.  I took down the splint.  His incision is healing in well.  Minimal swelling.  No evidence of infection.  He is able to move his DIP and his MP joints.      ASSESSMENT AND PLAN:  Based upon the above findings, a diagnosis of right index finger PIP joint fusion was made.  He will see OT today.  We will make him a splint to protect his PIP joint fusion.  The splint will be used for at least 4 more weeks.  He will see us back in another week for removal of sutures.  All questions were answered.  He was happy with his visit.         Again, thank you for allowing me to participate in the care of your patient.        Sincerely,        EMILIANA Aguilar MD

## 2019-02-19 NOTE — PROGRESS NOTES
PRESENTING COMPLAINT:  Postoperative visit, status post right index finger PIP joint arthritis with PIP joint fusion with a cannulated screw done on 02/08/2019.      HISTORY OF PRESENTING COMPLAINT:  Mr. Whitley is 68 years old.  He is just under 2 weeks out from surgery, overall doing well, no major issues, minimal pain.      PHYSICAL EXAMINATION:  Vital signs are stable.  He is afebrile, in no obvious distress.  I took down the splint.  His incision is healing in well.  Minimal swelling.  No evidence of infection.  He is able to move his DIP and his MP joints.      ASSESSMENT AND PLAN:  Based upon the above findings, a diagnosis of right index finger PIP joint fusion was made.  He will see OT today.  We will make him a splint to protect his PIP joint fusion.  The splint will be used for at least 4 more weeks.  He will see us back in another week for removal of sutures.  All questions were answered.  He was happy with his visit.

## 2019-02-19 NOTE — NURSING NOTE
Kameron Whitley's chief complaint for this visit includes:  Chief Complaint   Patient presents with     Surgical Followup     Right index finger fusion DOS: 02/08/2019     PCP: Nicola Andrews    Referring Provider:  No referring provider defined for this encounter.    /78 (BP Location: Left arm, Patient Position: Sitting, Cuff Size: Adult Regular)   Pulse 67   SpO2 95%   No Pain (1)     Do you need any medication refills at today's visit? no

## 2019-02-19 NOTE — PATIENT INSTRUCTIONS
Thanks for coming today.  Ortho/Sports Medicine Clinic  [address] [place] [address]    To schedule future appointments in Ortho Clinic, you may call [phone].    To schedule ordered imaging by your provider:   Call Central Imaging [phone]    To schedule an injection ordered by your provider:  Call Central Imaging Injection scheduling line: [phone]  [email or web address] available online at:  [email or web address]    Please call if any further questions or concerns ([phone]).  Clinic hours 8 am to 5 pm.    Return to clinic (call) if symptoms worsen or fail to improve.

## 2019-02-19 NOTE — PROGRESS NOTES
Hand Therapy Initial Evaluation    Current Date:  2/19/2019  Referring Physician: Dr. Aguilar    Subjective:  Kameron Whitley is a 68 year old right hand dominant male.    Diagnosis:Right IF pain due to OA  DOS:  2/8/19  Procedure: Right IF PIP joint fusion  Post: 1w 4d    Patient reports symptoms of pain, stiffness/loss of motion, weakness/loss of strength and edema of the right IF which occurred due to DJD. Since onset symptoms are gradually getting better.  Special tests:  x-rays.  Previous treatment: hand therapy.    General health as reported by patient is good.  Pertinent medical history includes:Overweight  Medical allergies:none.  Surgical history: orthopedic: (R) IF, other: gall bladder, hernia.  Medication history: statin.    Occupational Profile Information:  Current occupation is retired  Barriers include:none  Prior functional level:  no limitations  Mobility: No difficulty  Transportation: drives  Leisure activities/hobbies: ancestry, yard work  Upper Extremity Functional Index Score:  SCORE:   Column Totals: /80: 51   (A lower score indicates greater disability.)    Objective:  Pain:    VAS(0-10) 2/19/19   At Rest:  1/10   With Use:  2/10   At Worst  2/10     Report of Pain:  Location: right index finger   Description: Ache,  Frequency:  Intermittent  Duration:  Same all the time  Exacerbated by:  activity  Relieved by: rest,   Progression:  gradually improving    ROM:    Index Finger 2/19/19 2/19/19   AROM(PROM) Right Left   MCP /80 /85   PIP fused /   DIP /45 /55   YOUNG         Strength:  [x]                    Contraindicated    Edema:  []         None   [x]         Mild    []         Moderate      []         Severe                  Location: (R) IF  Edema - Measured circumferentially in cm  Date 2/19/19         Right Left Right Left Right Left Right Left Right Left Right Left                   P1 7.5 6.1             PIP 7.3 6.0              P2 6.0 5.4             DIP                     Color/Temperature:     []        Normal  [x]        Abnormal   (R) IF reddened    Wound(s):  []          NA       []        Open    []        Closed    [x]        Sutured        []        Drainage     Scar:  [x]        NA           []      Adherent      []       Non-adherent       []       Raised     []       Flattened              Palpation:  []         Normal        [x]       Tender       [x]      Mild         []       Moderate []       Severe    Location: (R) IF dorsal on and around incision    Sensation: [x]                   WNL throughout all nerve distributions; per patient report    []                   Decreased  []        Median    []        Ulnar    []        Radial nerve distribution    Assessment:  Patient presents with symptoms consistent with diagnosis of finger pain,  with surgical  intervention.     Patient's limitations or Problem List includes:  Pain, Decreased ROM/motion, Increased edema, Weakness, Decreased stability, Decreased  and Decreased pinch of the right index finger which interferes with the patient's ability to perform Self Care Tasks (dressing, bathing, hygiene/toileting), Recreational Activities and Household Chores as compared to previous level of function.    Rehab Potential:  Excellent - Return to full activity, no limitations    Patient will benefit from skilled Occupational Therapy to increase ROM, flexibility,  strength, pinch strength and stability of (R) IF PIP joint and decrease pain and edema to return to previous activity level and resume normal daily tasks and to reach their rehab potential.    Barriers to Learning:  No barrier    Communication Issues:  Patient appears to be able to clearly communicate and understand verbal and written communication and follow directions correctly.    Chart Review: Brief history including review of medical and/or therapy records relating to the presenting problem and Simple history review with patient    Identified Performance  Deficits: bathing/showering, dressing, home establishment and management, meal preparation and cleanup and leisure activities    Assessment of Occupational Performance:  3-5 Performance Deficits    Clinical Decision Making (Complexity): Low complexity    Treatment Explanation:  The following has been discussed with the patient:  RX ordered/plan of care  Anticipated outcomes  Possible risks and side effects    Frequency:  1 X week, once daily  Duration:  for 6 weeks  Treatment Plan:  Therapeutic Exercise:  AROM, AAROM, PROM, Tendon Gliding, Blocking and Isotonics  Manual Techniques:  Scar mobilization  Orthotic Fabrication:  Static orthosis and Finger based orthosis  Discharge Plan:  Achieve all LTG.  Independent in home treatment program.  Reach maximal therapeutic benefit.      Home Exercise Program:  AROM of unaffected joints (MP and DIP)  Compliance with full time wear of orthosis    Next Visit:  Check orthosis and adjust as indicated due to decreased swelling  A/PROM of unaffected joints

## 2019-02-20 LAB — COPATH REPORT: NORMAL

## 2019-02-24 ENCOUNTER — ANESTHESIA EVENT (OUTPATIENT)
Dept: GASTROENTEROLOGY | Facility: CLINIC | Age: 69
End: 2019-02-24
Payer: COMMERCIAL

## 2019-02-24 ASSESSMENT — LIFESTYLE VARIABLES: TOBACCO_USE: 1

## 2019-02-24 NOTE — ANESTHESIA PREPROCEDURE EVALUATION
Anesthesia Pre-Procedure Evaluation    Patient: Kameron Whitley   MRN: [id number] : 1950          Preoperative Diagnosis: screening    Procedure(s):  COLONOSCOPY    Past Medical History:   Diagnosis Date     Closed fracture of clavicle     Age 2 - Right Clavicle     Past Surgical History:   Procedure Laterality Date     ARTHRODESIS FINGER(S) Right 2019    Procedure: Right index finger fusion;  Surgeon: EMILIANA Aguilar MD;  Location: [facility] OR     CARPAL TUNNEL RELEASE RT/LT  2006    right hand     COLONOSCOPY  2009    Normal-f/u 10 years (At [facility])     LAPAROSCOPIC CHOLECYSTECTOMY N/A 10/5/2016    Procedure: LAPAROSCOPIC CHOLECYSTECTOMY;  Surgeon: Nikita Anderson MD;  Location: [facility] OR     LAPAROSCOPIC HERNIORRHAPHY INGUINAL BILATERAL  1/10/2014    Procedure: LAPAROSCOPIC HERNIORRHAPHY INGUINAL BILATERAL;  Laparoscopic Bilateral Inguinal Hernia Repair;  Surgeon: Nikita Anderson MD;  Location: [facility] OR       Anesthesia Evaluation     . Pt has had prior anesthetic. Type: General and MAC    No history of anesthetic complications          ROS/MED HX    ENT/Pulmonary:     (+)tobacco use, Past use , . .    Neurologic:  - neg neurologic ROS     Cardiovascular:     (+) Dyslipidemia, hypertension----. : . . . :. . Previous cardiac testing date:results:date: results:ECG reviewed date:1- results:NSR date: results:          METS/Exercise Tolerance:  >4 METS   Hematologic:  - neg hematologic  ROS       Musculoskeletal:   (+) , , other musculoskeletal- Closed fracture of clavicle      GI/Hepatic:     (+) GERD Asymptomatic on medication, bowel prep,      (-) cholecystitis/cholelithiasis   Renal/Genitourinary:  - ROS Renal section negative       Endo:  - neg endo ROS       Psychiatric:  - neg psychiatric ROS       Infectious Disease:  - neg infectious disease ROS       Malignancy:      - no malignancy   Other:    - neg other ROS                      Physical Exam  Normal systems: cardiovascular, pulmonary  "and dental    Airway   Mallampati: II  TM distance: >3 FB  Neck ROM: full    Dental     Cardiovascular   Rhythm and rate: regular and normal      Pulmonary    breath sounds clear to auscultation            Lab Results   Component Value Date    WBC 5.6 01/15/2019    HGB 15.5 01/15/2019    HCT 45.1 01/15/2019     01/15/2019     01/15/2019    POTASSIUM 4.5 01/15/2019    CHLORIDE 104 01/15/2019    CO2 31 01/15/2019    BUN 18 01/15/2019    CR 1.02 01/15/2019    GLC 85 01/15/2019    AISLINN 8.4 (L) 01/15/2019    ALBUMIN 3.8 01/15/2019    PROTTOTAL 7.1 01/15/2019    ALT 33 01/15/2019    AST 29 01/15/2019    ALKPHOS 72 01/15/2019    BILITOTAL 0.8 01/15/2019    LIPASE 90 10/04/2016       Preop Vitals  BP Readings from Last 3 Encounters:   02/19/19 131/78   02/08/19 117/77   01/15/19 122/78    Pulse Readings from Last 3 Encounters:   02/19/19 67   02/08/19 56   01/15/19 55      Resp Readings from Last 3 Encounters:   02/08/19 11   01/15/19 16   11/16/18 16    SpO2 Readings from Last 3 Encounters:   02/19/19 95%   02/08/19 97%   01/15/19 98%      Temp Readings from Last 1 Encounters:   02/08/19 97.6  F (36.4  C) (Temporal)    Ht Readings from Last 1 Encounters:   01/15/19 1.61 m (5' 3.39\")      Wt Readings from Last 1 Encounters:   01/15/19 78.9 kg (174 lb)    Estimated body mass index is 30.45 kg/m  as calculated from the following:    Height as of 1/15/19: 1.61 m (5' 3.39\").    Weight as of 1/15/19: 78.9 kg (174 lb).       Anesthesia Plan      History & Physical Review  History and physical reviewed and following examination; no interval change.    ASA Status:  2 .    NPO Status:  > 6 hours    Plan for MAC with Propofol induction. Maintenance will be TIVA.  Reason for MAC:  Deep or markedly invasive procedure (G8)  PONV prophylaxis:  Ondansetron (or other 5HT-3)       Postoperative Care      Consents  Anesthetic plan, risks, benefits and alternatives discussed with:  Patient.  Use of blood products discussed: No .   " .                 Les Hallman, EMMY CRNA

## 2019-02-25 ENCOUNTER — ANESTHESIA (OUTPATIENT)
Dept: GASTROENTEROLOGY | Facility: CLINIC | Age: 69
End: 2019-02-25
Payer: COMMERCIAL

## 2019-02-25 ENCOUNTER — SURGERY (OUTPATIENT)
Age: 69
End: 2019-02-25
Payer: COMMERCIAL

## 2019-02-25 ENCOUNTER — HOSPITAL ENCOUNTER (OUTPATIENT)
Facility: CLINIC | Age: 69
Discharge: HOME | End: 2019-02-25
Attending: FAMILY MEDICINE | Admitting: FAMILY MEDICINE
Payer: COMMERCIAL

## 2019-02-25 VITALS
OXYGEN SATURATION: 96 % | HEART RATE: 50 BPM | DIASTOLIC BLOOD PRESSURE: 87 MMHG | SYSTOLIC BLOOD PRESSURE: 137 MMHG | RESPIRATION RATE: 16 BRPM

## 2019-02-25 LAB — COLONOSCOPY: NORMAL

## 2019-02-25 PROCEDURE — 25800030 ZZH RX IP 258 OP 636: Performed by: NURSE ANESTHETIST, CERTIFIED REGISTERED

## 2019-02-25 PROCEDURE — 37000008 ZZH ANESTHESIA TECHNICAL FEE, 1ST 30 MIN: Performed by: FAMILY MEDICINE

## 2019-02-25 PROCEDURE — 88305 TISSUE EXAM BY PATHOLOGIST: CPT | Mod: 26 | Performed by: FAMILY MEDICINE

## 2019-02-25 PROCEDURE — 45380 COLONOSCOPY AND BIOPSY: CPT | Performed by: FAMILY MEDICINE

## 2019-02-25 PROCEDURE — 25000128 H RX IP 250 OP 636: Performed by: NURSE ANESTHETIST, CERTIFIED REGISTERED

## 2019-02-25 PROCEDURE — 37000009 ZZH ANESTHESIA TECHNICAL FEE, EACH ADDTL 15 MIN: Performed by: FAMILY MEDICINE

## 2019-02-25 PROCEDURE — 45385 COLONOSCOPY W/LESION REMOVAL: CPT | Mod: PT | Performed by: FAMILY MEDICINE

## 2019-02-25 PROCEDURE — 45385 COLONOSCOPY W/LESION REMOVAL: CPT | Performed by: FAMILY MEDICINE

## 2019-02-25 PROCEDURE — 40000296 ZZH STATISTIC ENDO RECOVERY CLASS 1:2 FIRST HOUR: Performed by: FAMILY MEDICINE

## 2019-02-25 PROCEDURE — 25000125 ZZHC RX 250: Performed by: NURSE ANESTHETIST, CERTIFIED REGISTERED

## 2019-02-25 PROCEDURE — 45380 COLONOSCOPY AND BIOPSY: CPT | Mod: 59 | Performed by: FAMILY MEDICINE

## 2019-02-25 PROCEDURE — 25000125 ZZHC RX 250: Performed by: FAMILY MEDICINE

## 2019-02-25 PROCEDURE — 88305 TISSUE EXAM BY PATHOLOGIST: CPT | Performed by: FAMILY MEDICINE

## 2019-02-25 RX ORDER — PROPOFOL 10 MG/ML
INJECTION, EMULSION INTRAVENOUS CONTINUOUS PRN
Status: DISCONTINUED | OUTPATIENT
Start: 2019-02-25 | End: 2019-02-25

## 2019-02-25 RX ORDER — SODIUM CHLORIDE, SODIUM LACTATE, POTASSIUM CHLORIDE, CALCIUM CHLORIDE 600; 310; 30; 20 MG/100ML; MG/100ML; MG/100ML; MG/100ML
INJECTION, SOLUTION INTRAVENOUS CONTINUOUS
Status: DISCONTINUED | OUTPATIENT
Start: 2019-02-25 | End: 2019-02-25 | Stop reason: HOSPADM

## 2019-02-25 RX ORDER — NALOXONE HYDROCHLORIDE 0.4 MG/ML
.1-.4 INJECTION, SOLUTION INTRAMUSCULAR; INTRAVENOUS; SUBCUTANEOUS
Status: CANCELLED | OUTPATIENT
Start: 2019-02-25 | End: 2019-02-26

## 2019-02-25 RX ORDER — PROPOFOL 10 MG/ML
INJECTION, EMULSION INTRAVENOUS PRN
Status: DISCONTINUED | OUTPATIENT
Start: 2019-02-25 | End: 2019-02-25

## 2019-02-25 RX ORDER — SODIUM CHLORIDE, SODIUM LACTATE, POTASSIUM CHLORIDE, CALCIUM CHLORIDE 600; 310; 30; 20 MG/100ML; MG/100ML; MG/100ML; MG/100ML
INJECTION, SOLUTION INTRAVENOUS CONTINUOUS
Status: CANCELLED | OUTPATIENT
Start: 2019-02-25

## 2019-02-25 RX ORDER — LIDOCAINE 40 MG/G
CREAM TOPICAL
Status: DISCONTINUED | OUTPATIENT
Start: 2019-02-25 | End: 2019-02-25 | Stop reason: HOSPADM

## 2019-02-25 RX ORDER — ONDANSETRON 4 MG/1
4 TABLET, ORALLY DISINTEGRATING ORAL EVERY 30 MIN PRN
Status: CANCELLED | OUTPATIENT
Start: 2019-02-25

## 2019-02-25 RX ORDER — MEPERIDINE HYDROCHLORIDE 25 MG/ML
12.5 INJECTION INTRAMUSCULAR; INTRAVENOUS; SUBCUTANEOUS
Status: CANCELLED | OUTPATIENT
Start: 2019-02-25

## 2019-02-25 RX ORDER — ONDANSETRON 2 MG/ML
4 INJECTION INTRAMUSCULAR; INTRAVENOUS
Status: DISCONTINUED | OUTPATIENT
Start: 2019-02-25 | End: 2019-02-25 | Stop reason: HOSPADM

## 2019-02-25 RX ORDER — ONDANSETRON 2 MG/ML
4 INJECTION INTRAMUSCULAR; INTRAVENOUS EVERY 30 MIN PRN
Status: CANCELLED | OUTPATIENT
Start: 2019-02-25

## 2019-02-25 RX ADMIN — SODIUM CHLORIDE, POTASSIUM CHLORIDE, SODIUM LACTATE AND CALCIUM CHLORIDE: 600; 310; 30; 20 INJECTION, SOLUTION INTRAVENOUS at 11:37

## 2019-02-25 RX ADMIN — PROPOFOL 150 MCG/KG/MIN: 10 INJECTION, EMULSION INTRAVENOUS at 12:10

## 2019-02-25 RX ADMIN — SODIUM CHLORIDE, POTASSIUM CHLORIDE, SODIUM LACTATE AND CALCIUM CHLORIDE: 600; 310; 30; 20 INJECTION, SOLUTION INTRAVENOUS at 11:59

## 2019-02-25 RX ADMIN — PROPOFOL 50 MG: 10 INJECTION, EMULSION INTRAVENOUS at 12:10

## 2019-02-25 RX ADMIN — LIDOCAINE HYDROCHLORIDE 0.1 ML: 10 INJECTION, SOLUTION EPIDURAL; INFILTRATION; INTRACAUDAL; PERINEURAL at 11:37

## 2019-02-25 NOTE — ANESTHESIA CARE TRANSFER NOTE
Patient: Kameron Whitley    Procedure(s):  COMBINED COLONOSCOPY, SINGLE POLYPECTOMY BY BIOPSY  Combined Colonoscopy, Remove Polyp By Snare    Diagnosis: screening  Diagnosis Additional Information: No value filed.    Anesthesia Type:   MAC     Note:  Airway :Face Mask  Patient transferred to:Phase II  Handoff Report: Identifed the Patient, Identified the Reponsible Provider, Reviewed the pertinent medical history, Discussed the surgical course, Reviewed Intra-OP anesthesia mangement and issues during anesthesia, Set expectations for post-procedure period and Allowed opportunity for questions and acknowledgement of understanding      Vitals: (Last set prior to Anesthesia Care Transfer)    CRNA VITALS  2/25/2019 1207 - 2/25/2019 1248      2/25/2019             Pulse:  62    SpO2:  97 %    Resp Rate (observed):  14                Electronically Signed By: EMMY Warner CRNA  February 25, 2019  12:48 PM

## 2019-02-25 NOTE — ANESTHESIA POSTPROCEDURE EVALUATION
Patient: Kameron Whitley    Procedure(s):  COMBINED COLONOSCOPY, SINGLE POLYPECTOMY BY BIOPSY  Combined Colonoscopy, Remove Polyp By Snare    Diagnosis:screening  Diagnosis Additional Information: No value filed.    Anesthesia Type:  MAC    Note:  Anesthesia Post Evaluation    Patient location during evaluation: Phase 2 and Bedside  Patient participation: Able to fully participate in evaluation  Level of consciousness: awake and alert  Pain management: adequate  Airway patency: patent  Cardiovascular status: acceptable  Respiratory status: acceptable  Hydration status: acceptable  PONV: none     Anesthetic complications: None    Comments: Patient was pleased with his care today. No anesthesia related complications noted. Will follow as needed.        Last vitals:  Vitals:    02/25/19 1240 02/25/19 1253   BP: 100/66 108/68   Pulse: 53 (!) 49   Resp: 14 14   SpO2: 97% 96%         Electronically Signed By: EMMY Warner CRNA  February 25, 2019  12:59 PM

## 2019-02-25 NOTE — DISCHARGE INSTRUCTIONS
[facility]    Home Care Following Endoscopy          Activity:    You have just undergone an endoscopic procedure usually performed with conscious sedation.  Do not work or operate machinery (including a car) for at least 12 hours.      I encourage you to walk and attempt to pass this air as soon as possible.    Diet:    Return to the diet you were on before your procedure but eat lightly for the first 12-24 hours.    Drink plenty of water.    Resume any regular medications unless otherwise advised by your physician.  Please begin any new medication prescribed as a result of your procedure as directed by your physician.     If you had any biopsy or polyp removed please refrain from aspirin or aspirin products for 2 days.  If on Coumadin please restart as instructed by your physician.   Pain:    You may take Tylenol as needed for pain.  Expected Recovery:    You can expect some mild abdominal fullness and/or discomfort due to the air used to inflate your intestinal tract. It is also normal to have a mild sore throat after upper endoscopy.    Call Your Physician if You Have:    After Upper Endoscopy:  o Shoulder, back or chest pain.  o Difficulty breathing or swallowing.  o Vomiting blood.    After Colonoscopy:  o Worsening persisting abdominal pain which is worse with activity.  o Fevers (>101 degrees F), chills or shakes.  o Passage of continued blood with bowel movements.   Any questions or concerns about your recovery, please call [phone] or after hours [phone] Nurse Advice Line.    Follow-up Care:    You should receive a call or letter with your results within 1 week. Please call if you have not received a notification of your results.  If asked to return to clinic please make an appointment 1 week after your procedure.  Call [phone].

## 2019-02-26 ENCOUNTER — THERAPY VISIT (OUTPATIENT)
Dept: OCCUPATIONAL THERAPY | Facility: CLINIC | Age: 69
End: 2019-02-26
Payer: COMMERCIAL

## 2019-02-26 ENCOUNTER — ALLIED HEALTH/NURSE VISIT (OUTPATIENT)
Dept: NURSING | Facility: CLINIC | Age: 69
End: 2019-02-26
Payer: COMMERCIAL

## 2019-02-26 DIAGNOSIS — M79.644 PAIN OF FINGER OF RIGHT HAND: ICD-10-CM

## 2019-02-26 DIAGNOSIS — M19.90 ARTHRITIS: ICD-10-CM

## 2019-02-26 DIAGNOSIS — Z48.02 VISIT FOR SUTURE REMOVAL: ICD-10-CM

## 2019-02-26 DIAGNOSIS — M20.031 SWAN-NECK DEFORMITY OF FINGER OF RIGHT HAND: Primary | ICD-10-CM

## 2019-02-26 NOTE — PATIENT INSTRUCTIONS
"Patient Education     Stitches or Staple Removal  You were seen today for removal of your stitches or staples. Your wound is healing as expected. The wound has healed well enough that the stitches or staples can be removed. The wound will continue to heal for the next few months.  At this time there is no sign of infection.   Home care    If you have pain, take pain medicine as advised by your healthcare provider.     Keep your wound clean and protected by covering it with a bandage for the next week or so.     Wash your hands with soap and warm water before and after caring for your wound. This helps prevent infection.    Clean the wound gently with soap and warm water daily or as directed by your healthcare provider. Don't use iodine, alcohol, or other cleansers on the wound.  Gently pat it dry. Put on a new bandage, if needed. Don't reuse bandages.    If the area gets wet, gently pat it dry with a clean cloth. Replace the wet bandage with a dry one.    Check the wound daily for signs of infection. (These are listed under \"When to seek medical advice\" below.)    You may shower and bathe as usual. Swimming is now permitted.    Keep the wound out of prolonged direct sunlight. The new skin is very sensitive and can easily sunburn causing worse scarring.    Ask your provider about using over-the-counter scar removing creams if your wound is highly visible.  Follow-up care  Follow up with your healthcare provider as advised.  When to seek medical advice   Call your healthcare provider if any of the following occur:    Wound reopens or bleeds    Signs of an infection, such as:  ? Increasing redness or swelling around the wound  ? Increased warmth from the wound  ? Worsening pain  ? Red streaking lines away from the wound  ? Fluid draining from the wound    Fever of 100.4 F (38 C) or higher, or as directed by your healthcare provider  Date Last Reviewed: 4/1/2018 2000-2018 [employer]. [address], [place] [address]. All rights reserved. This information is not intended as a substitute for professional medical care. Always follow your healthcare professional's instructions.           Thanks for coming today.  Ortho/Sports Medicine Clinic  [address] [place] [address]    To schedule future appointments in Ortho Clinic, you may call [phone].    To schedule ordered imaging by your provider:   Call Central Imaging [phone]    To schedule an injection ordered by your provider:  Call Central Imaging Injection scheduling line: [phone]  [employer] available online at:  [email or web address]    Please call if any further questions or concerns ([phone]).  Clinic hours 8 am to 5 pm.    Return to clinic (call) if symptoms worsen or fail to improve.

## 2019-02-26 NOTE — PROGRESS NOTES
Kameron Whitley comes into clinic today at the request of  for removal of suture(s).    The patient is status post Right index finger PIP joint fusion, sx: 2/8/19.   There has been no history of infection or drainage.    O: 7 suture(s) are seen located on the dorsal aspect of the right 2nd finger. The wound is healing well with no signs of infection.    A: Suture removal.    P: All sutures were easily removed today. Routine wound care discussed. The patient will follow up in 3 weeks, per post op plan. If there are any concerns or signs and symptoms of infection, patient will follow up in clinic. Patient is agreeable with plan.    Alireza Langston RN

## 2019-02-27 LAB — COPATH REPORT: NORMAL

## 2019-03-19 ENCOUNTER — OFFICE VISIT (OUTPATIENT)
Dept: ORTHOPEDICS | Facility: CLINIC | Age: 69
End: 2019-03-19
Payer: COMMERCIAL

## 2019-03-19 DIAGNOSIS — M19.90 ARTHRITIS: Primary | ICD-10-CM

## 2019-03-19 PROCEDURE — 99024 POSTOP FOLLOW-UP VISIT: CPT | Performed by: PLASTIC SURGERY

## 2019-03-19 ASSESSMENT — PAIN SCALES - GENERAL: PAINLEVEL: NO PAIN (0)

## 2019-03-19 NOTE — PATIENT INSTRUCTIONS
Thanks for coming today.  Ortho/Sports Medicine Clinic  [address] [place] [address]    To schedule future appointments in Ortho Clinic, you may call [phone].    To schedule ordered imaging by your provider:   Call Central Imaging [phone]    To schedule an injection ordered by your provider:  Call Central Imaging Injection scheduling line: [phone]  [facility] available online at:  [email or web address]    Please call if any further questions or concerns ([phone]).  Clinic hours 8 am to 5 pm.    Return to clinic (call) if symptoms worsen or fail to improve.

## 2019-03-19 NOTE — LETTER
3/19/2019         RE: Kameron Whitley  [address] [place] [address]        Dear Colleague,    Thank you for referring your patient, Kameron Whitley, to the [facility]. Please see a copy of my visit note below.    POSTOPERATIVE PVISIT      PRESENTING COMPLAINT:  Postoperative visit status post right index finger PIP joint arthritis with PIP joint fusion with a cannulated screw, done 02/08/2019.       HISTORY OF PRESENTING COMPLAINT:  Mr. Whitley is 68 years old.  He is about 5 weeks out from surgery.  Doing well, no major issues.      PHYSICAL EXAMINATION:     VITALS SIGNS:  Stable.  He is afebrile, in no obvious distress.     EXTREMITIES:  His wound is completely healed.  Minimal swelling.  Joint is nontender and stable.      ASSESSMENT AND PLAN:  Based on above findings, a diagnosis of right finger PIP joint fusion was made.  Plan is to keep the splint on for another week to 2 weeks.  Thereafter, he can discontinue it.  I will see him back on a p.r.n. basis.         Again, thank you for allowing me to participate in the care of your patient.        Sincerely,        EMILIANA Aguilar MD

## 2019-03-19 NOTE — PROGRESS NOTES
POSTOPERATIVE PVISIT      PRESENTING COMPLAINT:  Postoperative visit status post right index finger PIP joint arthritis with PIP joint fusion with a cannulated screw, done 02/08/2019.       HISTORY OF PRESENTING COMPLAINT:  Mr. Whitley is 68 years old.  He is about 5 weeks out from surgery.  Doing well, no major issues.      PHYSICAL EXAMINATION:     VITALS SIGNS:  Stable.  He is afebrile, in no obvious distress.     EXTREMITIES:  His wound is completely healed.  Minimal swelling.  Joint is nontender and stable.      ASSESSMENT AND PLAN:  Based on above findings, a diagnosis of right finger PIP joint fusion was made.  Plan is to keep the splint on for another week to 2 weeks.  Thereafter, he can discontinue it.  I will see him back on a p.r.n. basis.

## 2019-04-19 DIAGNOSIS — E78.5 HYPERLIPIDEMIA LDL GOAL <130: ICD-10-CM

## 2019-04-22 RX ORDER — SIMVASTATIN 20 MG
TABLET ORAL
Qty: 90 TABLET | Refills: 1 | Status: SHIPPED | OUTPATIENT
Start: 2019-04-22 | End: 2019-08-12

## 2019-07-01 PROBLEM — M79.644 PAIN OF FINGER OF RIGHT HAND: Status: RESOLVED | Noted: 2019-02-19 | Resolved: 2019-07-01

## 2019-07-01 PROBLEM — M19.90 ARTHRITIS: Status: RESOLVED | Noted: 2019-02-19 | Resolved: 2019-07-01

## 2019-07-01 NOTE — PROGRESS NOTES
Discharge Summary - Hand Therapy    Patient did not return to therapy.  We will assume that patient's goals were met.    D/C from [facility].

## 2019-08-09 ENCOUNTER — TELEPHONE (OUTPATIENT)
Dept: FAMILY MEDICINE | Facility: OTHER | Age: 69
End: 2019-08-09

## 2019-08-09 DIAGNOSIS — E78.5 HYPERLIPIDEMIA LDL GOAL <130: ICD-10-CM

## 2019-08-09 NOTE — TELEPHONE ENCOUNTER
Reason for Call:  Medication or medication refill:    Do you use a [facility]?  Name of the pharmacy and phone number for the current request:  [facility] - [phone]    Name of the medication requested: statin    Other request: is currently taking simvastatin and would like to discuss change. Declined appt. He only has two left.      Can we leave a detailed message on this number? YES    Phone number patient can be reached at: Home number on file [phone] (home)    Best Time: any    Call taken on 8/9/2019 at 8:48 AM by Kavya Pickett

## 2019-08-12 RX ORDER — SIMVASTATIN 20 MG
20 TABLET ORAL AT BEDTIME
Qty: 90 TABLET | Refills: 0 | Status: SHIPPED | OUTPATIENT
Start: 2019-08-12 | End: 2019-11-13 | Stop reason: SINTOL

## 2019-08-12 NOTE — TELEPHONE ENCOUNTER
Patient was calling to check the status of his Statin medication refill. He is also wondering if he needs to switch type of statin medication. He had called previously and was routed to Dr Mack but needs to be signed off by PCP . Best number to reach patient at is [phone]. Please Advise.

## 2019-08-12 NOTE — TELEPHONE ENCOUNTER
I can give him 3 months worth of medication as he is not due for labs until November of this year.  No further refills without an office visit.

## 2019-10-28 NOTE — PATIENT INSTRUCTIONS
If you'd like to get a shingles vaccine, it's probably cheaper at the pharmacy.    You might want to check on cost of Viagra at a local pharmacy soon.  Cost is going down.    Let's change your simvastatin to atorvastatin to help with your aches.  Let me know if not responding adequately.      Can try dandruff shampoo for your itchy scalp.  (T-Sal or T-gel would also be good options).  Hydrocortisone cream can help too.    OK to try the Rx cream for hemorrhoids.    Contact us or return if questions or concerns.     Have a nice day!    Dr. Mack   Patient Education   Personalized Prevention Plan  You are due for the preventive services outlined below.  Your care team is available to assist you in scheduling these services.  If you have already completed any of these items, please share that information with your care team to update in your medical record.  Health Maintenance Due   Topic Date Due     Zoster (Shingles) Vaccine (2 of 3) 08/17/2017     LOW DOSE ASA FOR PRIMARY PREVENTION  11/09/2018     PHQ-2  01/01/2019     Flu Vaccine (1) 09/01/2019     Annual Wellness Visit  11/16/2019     FALL RISK ASSESSMENT  11/16/2019

## 2019-10-28 NOTE — PROGRESS NOTES
"SUBJECTIVE:   Kameron Whitley is a 69 year old male who presents for Preventive Visit.  Are you in the first 12 months of your Medicare coverage?  No    Healthy Habits:     In general, how would you rate your overall health?  Good    Frequency of exercise:  4-5 days/week    Duration of exercise:  45-60 minutes    Do you usually eat at least 4 servings of fruit and vegetables a day, include whole grains    & fiber and avoid regularly eating high fat or \"junk\" foods?  Yes    Taking medications regularly:  Yes    Medication side effects:  Muscle aches and Lightheadedness    Ability to successfully perform activities of daily living:  No assistance needed    Home Safety:  Lack of grab bars in the bathroom    Hearing Impairment:  Difficulty following a conversation in a noisy restaurant or crowded room and difficulty understanding soft or whispered speech    In the past 6 months, have you been bothered by leaking of urine?  No    In general, how would you rate your overall mental or emotional health?  Good      PHQ-2 Total Score: 0    Additional concerns today:  Yes    Discussed that any evaluation and treatment that is not considered preventive will incur additional charges.  Pt indicated that he understood and did want to proceed with an evaluation and management portion of the visit in addition to the preventive portion.    Pt wonders about changing from simvastatin to a different agent.  Getting some HA and achiness.  Seems to be related to the simvastatin.      Do you feel safe in your environment? Yes    Do you have a Health Care Directive? No, advance care planning information given to patient to review.  Patient declined advance care planning discussion at this time.      Fall risk  Fallen 2 or more times in the past year?: No  Any fall with injury in the past year?: No    Cognitive Screening   1) Repeat 3 items (Leader, Season, Table)    2) Clock draw: NORMAL  3) 3 item recall: Recalls 3 objects  Results: 3 " items recalled: COGNITIVE IMPAIRMENT LESS LIKELY    Mini-CogTM Copyright ADAL Crouch. Licensed by the author for use in [facility]; reprinted with permission (isrrael@[facility]). All rights reserved.      Do you have sleep apnea, excessive snoring or daytime drowsiness?: no    Reviewed and updated as needed this visit by clinical staff  Tobacco  Allergies  Meds  Med Hx  Surg Hx  Fam Hx  Soc Hx        Reviewed and updated as needed this visit by Provider        Social History     Tobacco Use     Smoking status: Former Smoker     Types: Cigarettes     Smokeless tobacco: Never Used   Substance Use Topics     Alcohol use: Yes     Alcohol/week: 1.0 standard drinks     Types: 1 Cans of beer per week     Comment: occ     If you drink alcohol do you typically have >3 drinks per day or >7 drinks per week? No    Alcohol Use 11/16/2018   Prescreen: >3 drinks/day or >7 drinks/week? No   Prescreen: >3 drinks/day or >7 drinks/week? -   No flowsheet data found.          Current providers sharing in care for this patient include:   Patient Care Team:  Nicola Andrews PA-C as PCP - General (Physician Assistant)  Nicola Andrews PA-C as Assigned PCP    The following health maintenance items are reviewed in Epic and correct as of today:  Health Maintenance   Topic Date Due     ZOSTER IMMUNIZATION (2 of 3) 08/17/2017     LOW DOSE ASA FOR PRIMARY PREVENTION  11/09/2018     PHQ-2  01/01/2019     INFLUENZA VACCINE (1) 09/01/2019     MEDICARE ANNUAL WELLNESS VISIT  11/16/2019     FALL RISK ASSESSMENT  11/16/2019     ADVANCE CARE PLANNING  11/16/2023     COLONOSCOPY  02/25/2024     LIPID  11/11/2024     DTAP/TDAP/TD IMMUNIZATION (3 - Td) 03/06/2025     HEPATITIS C SCREENING  Completed     PNEUMOCOCCAL IMMUNIZATION 65+ LOW/MEDIUM RISK  Completed     AORTIC ANEURYSM SCREENING (SYSTEM ASSIGNED)  Completed     IPV IMMUNIZATION  Aged Out     MENINGITIS IMMUNIZATION  Aged Out     BP Readings from Last 3 Encounters:   11/13/19 128/74    02/25/19 137/87   02/19/19 131/78    Wt Readings from Last 3 Encounters:   11/13/19 79.8 kg (175 lb 14.4 oz)   01/15/19 78.9 kg (174 lb)   11/27/18 78.5 kg (173 lb)                  Patient Active Problem List   Diagnosis     Impacted cerumen     Groin mass     ED (erectile dysfunction)     Erectile dysfunction due to diseases classified elsewhere     Cholelithiasis with acute cholecystitis without biliary obstruction     Hyperlipidemia LDL goal <130     Elevated blood pressure reading without diagnosis of hypertension     Disturbance of skin sensation     [place]-neck deformity of finger     Sinus bradycardia     Past Surgical History:   Procedure Laterality Date     ARTHRODESIS FINGER(S) Right 2/8/2019    Procedure: Right index finger fusion;  Surgeon: EMILIANA Aguilar MD;  Location: [facility] OR     CARPAL TUNNEL RELEASE RT/LT  1/2006    right hand     COLONOSCOPY  4/2009    Normal-f/u 10 years (At [facility])     COLONOSCOPY N/A 2/25/2019    Procedure: COMBINED COLONOSCOPY, SINGLE POLYPECTOMY BY BIOPSY;  Surgeon: Brendan Nunes MD;  Location: [facility]     LAPAROSCOPIC CHOLECYSTECTOMY N/A 10/5/2016    Procedure: LAPAROSCOPIC CHOLECYSTECTOMY;  Surgeon: Nikita Anderson MD;  Location: [facility]     LAPAROSCOPIC HERNIORRHAPHY INGUINAL BILATERAL  1/10/2014    Procedure: LAPAROSCOPIC HERNIORRHAPHY INGUINAL BILATERAL;  Laparoscopic Bilateral Inguinal Hernia Repair;  Surgeon: Nikita Anderson MD;  Location: [facility]       Social History     Tobacco Use     Smoking status: Former Smoker     Types: Cigarettes     Smokeless tobacco: Never Used   Substance Use Topics     Alcohol use: Yes     Alcohol/week: 1.0 standard drinks     Types: 1 Cans of beer per week     Comment: occ     Family History   Problem Relation Age of Onset     Diabetes Mother      Arthritis Mother      Diabetes Brother      Diabetes Brother          Current Outpatient Medications   Medication Sig Dispense Refill     aspirin 81 MG EC tablet Take 1 tablet (81  "mg) by mouth daily 90 tablet 3     LOW-DOSE ASPIRIN PO Take  by mouth.       sildenafil (VIAGRA) 100 MG tablet Take 1 tablet (100 mg) by mouth daily as needed (erectile dysfunction) Take 30 min to 4 hours before intercourse.  Never use with nitroglycerin, terazosin or doxazosin. 8 tablet 11     No Known Allergies  Recent Labs   Lab Test 11/11/19  0804 01/15/19  0920 11/16/18  0946 11/09/17  0918   LDL 49  --  102* 76   HDL 77  --  78 81   TRIG 78  --  91 89   ALT 30 33  --  30   CR 1.16 1.02  --  0.90   GFRESTIMATED 63 74  --  84   GFRESTBLACK 73 86  --  >90   POTASSIUM 4.0 4.5  --  4.1          Review of Systems   Constitutional: Negative for chills and fever.   HENT: Negative for ear pain, hearing loss and sore throat.    Eyes: Negative for pain and visual disturbance.   Respiratory: Negative for cough and shortness of breath.    Cardiovascular: Negative for chest pain, palpitations and peripheral edema.   Gastrointestinal: Positive for hematochezia. Negative for abdominal pain, constipation, diarrhea, heartburn and nausea.   Genitourinary: Positive for impotence. Negative for discharge, dysuria, frequency, genital sores, hematuria and urgency.   Musculoskeletal: Negative for arthralgias, joint swelling and myalgias.   Skin: Negative for rash.   Neurological: Negative for dizziness, weakness, headaches and paresthesias.   Psychiatric/Behavioral: Negative for mood changes. The patient is not nervous/anxious.      Constitutional, HEENT, cardiovascular, pulmonary, gi and gu systems are negative, except as otherwise noted.    OBJECTIVE:   /74   Pulse 59   Temp 97.3  F (36.3  C) (Temporal)   Resp 20   Ht 1.605 m (5' 3.19\")   Wt 79.8 kg (175 lb 14.4 oz)   SpO2 98%   BMI 30.97 kg/m   Estimated body mass index is 30.97 kg/m  as calculated from the following:    Height as of this encounter: 1.605 m (5' 3.19\").    Weight as of this encounter: 79.8 kg (175 lb 14.4 oz).  Physical Exam  GENERAL: healthy, alert and " no distress  EYES: Eyes grossly normal to inspection, PERRL and conjunctivae and sclerae normal  HENT: ear canals and TM's normal, nose and mouth without ulcers or lesions  NECK: no adenopathy, no asymmetry, masses, or scars and thyroid normal to palpation  RESP: lungs clear to auscultation - no rales, rhonchi or wheezes  CV: regular rate and rhythm, normal S1 S2, no S3 or S4, no murmur, click or rub, no peripheral edema and peripheral pulses strong  ABDOMEN: soft, nontender, no hepatosplenomegaly, no masses and bowel sounds normal  RECTAL: normal sphincter tone, no rectal masses, prostate 1-2+ enlarged, nontender and external hemorrhoid  MS: no gross musculoskeletal defects noted, no edema  SKIN: hemangioma on scalp, dry scalp posteriorly  NEURO: Normal strength and tone, mentation intact and speech normal  PSYCH: mentation appears normal, affect normal/bright    Diagnostic Test Results:  Labs reviewed in Epic  No results found for any visits on 11/13/19.    ASSESSMENT / PLAN:       ICD-10-CM    1. Encounter for Medicare annual wellness exam Z00.00    2. Hyperlipidemia LDL goal <130 E78.5 atorvastatin (LIPITOR) 20 MG tablet   3. Erectile dysfunction due to diseases classified elsewhere N52.1 sildenafil (VIAGRA) 100 MG tablet   4. Need for vaccination Z23    5. External hemorrhoids K64.4 hydrocortisone (ANUSOL-HC) 2.5 % cream     1.  Reviewed recommended screenings and ordered appropriate testing for pt's risks and per pt's request(s).   2.  Under good control, but patient is having side effects related to this.  Per his request, will change simvastatin to atorvastatin to see if this helps with his aches.  He will let me know if this is not effective.  3.  Currently under good control.  Medication was renewed.  4.  Immunized.  5.  Briefly discussed nature of these.  Given his irritation, hydrocortisone cream was provided.    Portions of this note were completed using Dragon dictation software.  Although reviewed,  "there may be typographical and other inadvertent errors that remain.           End of Life Planning:  Patient currently has an advanced directive: No.  I have verified the patient's ablity to prepare an advanced directive/make health care decisions.  Literature was provided to assist patient in preparing an advanced directive.    COUNSELING:  Reviewed preventive health counseling, as reflected in patient instructions       Regular exercise       Healthy diet/nutrition       Bladder control       Immunizations    Vaccinated for: Influenza             Alcohol Use       Prostate cancer screening       Osteoporosis Prevention/Bone Health       The 10-year ASCVD risk score (Angy PRICE Jr., et al., 2013) is: 13.8%    Values used to calculate the score:      Age: 69 years      Sex: Male      Is Non- : No      Diabetic: No      Tobacco smoker: No      Systolic Blood Pressure: 128 mmHg      Is BP treated: No      HDL Cholesterol: 77 mg/dL      Total Cholesterol: 142 mg/dL    Estimated body mass index is 30.97 kg/m  as calculated from the following:    Height as of this encounter: 1.605 m (5' 3.19\").    Weight as of this encounter: 79.8 kg (175 lb 14.4 oz).    Weight management plan: Discussed healthy diet and exercise guidelines     reports that he has quit smoking. His smoking use included cigarettes. He has never used smokeless tobacco.      Appropriate preventive services were discussed with this patient, including applicable screening as appropriate for cardiovascular disease, diabetes, osteopenia/osteoporosis, and glaucoma.  As appropriate for age/gender, discussed screening for colorectal cancer, prostate cancer, breast cancer, and cervical cancer. Checklist reviewing preventive services available has been given to the patient.    Reviewed patients plan of care and provided an AVS. The Intermediate Care Plan ( asthma action plan, low back pain action plan, and migraine action plan) for Kameron meets " the Care Plan requirement. This Care Plan has been established and reviewed with the Patient.    Counseling Resources:  ATP IV Guidelines  Pooled Cohorts Equation Calculator  Breast Cancer Risk Calculator  FRAX Risk Assessment  ICSI Preventive Guidelines  Dietary Guidelines for Americans, 2010  USDA's MyPlate  ASA Prophylaxis  Lung CA Screening    Arvind Mack MD, MD  [facility]    Identified Health Risks:

## 2019-10-30 ENCOUNTER — TELEPHONE (OUTPATIENT)
Dept: FAMILY MEDICINE | Facility: OTHER | Age: 69
End: 2019-10-30

## 2019-10-30 DIAGNOSIS — E78.5 HYPERLIPIDEMIA LDL GOAL <130: Primary | ICD-10-CM

## 2019-10-30 NOTE — TELEPHONE ENCOUNTER
Looks like patient will be due for cholesterol labs. I am not sure what else they could be due for. Patient is seeing Dr. Mack in November for a physical will route to him to see if he is due for anything else.     Carlos Garsia,

## 2019-10-30 NOTE — TELEPHONE ENCOUNTER
This patient has an appointment for lab work but does not have any orders. Please place some future orders as needed.    Thank You,  Afua Ardon MLT (ASCP)

## 2019-11-11 DIAGNOSIS — E78.5 HYPERLIPIDEMIA LDL GOAL <130: ICD-10-CM

## 2019-11-11 LAB
ALBUMIN SERPL-MCNC: 3.8 G/DL (ref 3.4–5)
ALP SERPL-CCNC: 66 U/L (ref 40–150)
ALT SERPL W P-5'-P-CCNC: 30 U/L (ref 0–70)
ANION GAP SERPL CALCULATED.3IONS-SCNC: 4 MMOL/L (ref 3–14)
AST SERPL W P-5'-P-CCNC: 24 U/L (ref 0–45)
BILIRUB SERPL-MCNC: 0.7 MG/DL (ref 0.2–1.3)
BUN SERPL-MCNC: 19 MG/DL (ref 7–30)
CALCIUM SERPL-MCNC: 8.6 MG/DL (ref 8.5–10.1)
CHLORIDE SERPL-SCNC: 107 MMOL/L (ref 94–109)
CHOLEST SERPL-MCNC: 142 MG/DL
CO2 SERPL-SCNC: 29 MMOL/L (ref 20–32)
CREAT SERPL-MCNC: 1.16 MG/DL (ref 0.66–1.25)
GFR SERPL CREATININE-BSD FRML MDRD: 63 ML/MIN/{1.73_M2}
GLUCOSE SERPL-MCNC: 99 MG/DL (ref 70–99)
HDLC SERPL-MCNC: 77 MG/DL
LDLC SERPL CALC-MCNC: 49 MG/DL
NONHDLC SERPL-MCNC: 65 MG/DL
POTASSIUM SERPL-SCNC: 4 MMOL/L (ref 3.4–5.3)
PROT SERPL-MCNC: 7.2 G/DL (ref 6.8–8.8)
SODIUM SERPL-SCNC: 140 MMOL/L (ref 133–144)
TRIGL SERPL-MCNC: 78 MG/DL

## 2019-11-11 PROCEDURE — 36415 COLL VENOUS BLD VENIPUNCTURE: CPT | Performed by: FAMILY MEDICINE

## 2019-11-11 PROCEDURE — 80061 LIPID PANEL: CPT | Performed by: FAMILY MEDICINE

## 2019-11-11 PROCEDURE — 80053 COMPREHEN METABOLIC PANEL: CPT | Performed by: FAMILY MEDICINE

## 2019-11-13 ENCOUNTER — OFFICE VISIT (OUTPATIENT)
Dept: FAMILY MEDICINE | Facility: OTHER | Age: 69
End: 2019-11-13
Payer: COMMERCIAL

## 2019-11-13 VITALS
WEIGHT: 175.9 LBS | TEMPERATURE: 97.3 F | HEART RATE: 59 BPM | OXYGEN SATURATION: 98 % | DIASTOLIC BLOOD PRESSURE: 74 MMHG | HEIGHT: 63 IN | SYSTOLIC BLOOD PRESSURE: 128 MMHG | BODY MASS INDEX: 31.17 KG/M2 | RESPIRATION RATE: 20 BRPM

## 2019-11-13 DIAGNOSIS — Z23 NEED FOR VACCINATION: ICD-10-CM

## 2019-11-13 DIAGNOSIS — Z00.00 ENCOUNTER FOR MEDICARE ANNUAL WELLNESS EXAM: Primary | ICD-10-CM

## 2019-11-13 DIAGNOSIS — N52.1 ERECTILE DYSFUNCTION DUE TO DISEASES CLASSIFIED ELSEWHERE: ICD-10-CM

## 2019-11-13 DIAGNOSIS — K64.4 EXTERNAL HEMORRHOIDS: ICD-10-CM

## 2019-11-13 DIAGNOSIS — E78.5 HYPERLIPIDEMIA LDL GOAL <130: ICD-10-CM

## 2019-11-13 PROCEDURE — G0008 ADMIN INFLUENZA VIRUS VAC: HCPCS | Performed by: FAMILY MEDICINE

## 2019-11-13 PROCEDURE — 90662 IIV NO PRSV INCREASED AG IM: CPT | Performed by: FAMILY MEDICINE

## 2019-11-13 PROCEDURE — 99214 OFFICE O/P EST MOD 30 MIN: CPT | Mod: 25 | Performed by: FAMILY MEDICINE

## 2019-11-13 PROCEDURE — 99397 PER PM REEVAL EST PAT 65+ YR: CPT | Mod: 25 | Performed by: FAMILY MEDICINE

## 2019-11-13 RX ORDER — ATORVASTATIN CALCIUM 20 MG/1
20 TABLET, FILM COATED ORAL DAILY
Qty: 90 TABLET | Refills: 3 | Status: SHIPPED | OUTPATIENT
Start: 2019-11-13 | End: 2020-11-05

## 2019-11-13 RX ORDER — SILDENAFIL 100 MG/1
100 TABLET, FILM COATED ORAL DAILY PRN
Qty: 8 TABLET | Refills: 11 | Status: SHIPPED | OUTPATIENT
Start: 2019-11-13 | End: 2020-11-13 | Stop reason: ALTCHOICE

## 2019-11-13 ASSESSMENT — ENCOUNTER SYMPTOMS
NAUSEA: 0
CONSTIPATION: 0
FREQUENCY: 0
DIZZINESS: 0
HEADACHES: 0
WEAKNESS: 0
PALPITATIONS: 0
HEMATOCHEZIA: 1
MYALGIAS: 0
SHORTNESS OF BREATH: 0
ABDOMINAL PAIN: 0
COUGH: 0
HEARTBURN: 0
SORE THROAT: 0
EYE PAIN: 0
ARTHRALGIAS: 0
FEVER: 0
JOINT SWELLING: 0
NERVOUS/ANXIOUS: 0
CHILLS: 0
HEMATURIA: 0
DYSURIA: 0
DIARRHEA: 0
PARESTHESIAS: 0

## 2019-11-13 ASSESSMENT — MIFFLIN-ST. JEOR: SCORE: 1451.01

## 2019-11-13 ASSESSMENT — ACTIVITIES OF DAILY LIVING (ADL): CURRENT_FUNCTION: NO ASSISTANCE NEEDED

## 2020-03-02 ENCOUNTER — OFFICE VISIT (OUTPATIENT)
Dept: FAMILY MEDICINE | Facility: OTHER | Age: 70
End: 2020-03-02
Payer: COMMERCIAL

## 2020-03-02 VITALS
WEIGHT: 174.6 LBS | DIASTOLIC BLOOD PRESSURE: 64 MMHG | HEART RATE: 71 BPM | OXYGEN SATURATION: 98 % | SYSTOLIC BLOOD PRESSURE: 110 MMHG | RESPIRATION RATE: 16 BRPM | BODY MASS INDEX: 29.81 KG/M2 | TEMPERATURE: 97.7 F | HEIGHT: 64 IN

## 2020-03-02 DIAGNOSIS — M54.2 CERVICALGIA: Primary | ICD-10-CM

## 2020-03-02 PROCEDURE — 99213 OFFICE O/P EST LOW 20 MIN: CPT | Performed by: STUDENT IN AN ORGANIZED HEALTH CARE EDUCATION/TRAINING PROGRAM

## 2020-03-02 ASSESSMENT — MIFFLIN-ST. JEOR: SCORE: 1465.73

## 2020-03-02 ASSESSMENT — PAIN SCALES - GENERAL: PAINLEVEL: SEVERE PAIN (6)

## 2020-03-02 NOTE — PROGRESS NOTES
Subjective     Kameron Whitley is a 69 year old male who presents to clinic today for the following health issues:    HPI   Neck Pain  Onset: 4 days - sheetrocked with son on Saturday, took some Tylenol to help him get through. Took ibuprofen 400 mg tid on Sunday.     Description:   Location: Right side  Radiation: into right shoulder and right side of head    Intensity: moderate    Progression of Symptoms:  worsening    Accompanying Signs & Symptoms:  Burning, prickly sensation (paresthesias) in arm(s): no   Numbness in arm(s): no   Weakness in arm(s):  no   Fever: no   Headache: YES  Nausea and/or vomiting: no     History:   Trauma: no   Previous neck pain: no   Previous surgery or injections: no   Previous Imaging (MRI,X ray): no     Precipitating factors:   Does movement increase the pain:  YES    Alleviating factors:  Tylenol and ibuprofen    Therapies Tried and outcome:  Tylenol, ibuprofen - Helps relieve pain a little      Patient Active Problem List   Diagnosis     Impacted cerumen     Groin mass     ED (erectile dysfunction)     Erectile dysfunction due to diseases classified elsewhere     Cholelithiasis with acute cholecystitis without biliary obstruction     Hyperlipidemia LDL goal <130     Elevated blood pressure reading without diagnosis of hypertension     Disturbance of skin sensation     [place]-neck deformity of finger     Sinus bradycardia     Past Surgical History:   Procedure Laterality Date     ARTHRODESIS FINGER(S) Right 2/8/2019    Procedure: Right index finger fusion;  Surgeon: EMILIANA Aguilar MD;  Location: [facility]     CARPAL TUNNEL RELEASE RT/LT  1/2006    right hand     COLONOSCOPY  4/2009    Normal-f/u 10 years (At [facility])     COLONOSCOPY N/A 2/25/2019    Procedure: COMBINED COLONOSCOPY, SINGLE POLYPECTOMY BY BIOPSY;  Surgeon: Brendan Nunes MD;  [facility]: [facility]     LAPAROSCOPIC CHOLECYSTECTOMY N/A 10/5/2016    Procedure: LAPAROSCOPIC CHOLECYSTECTOMY;  Surgeon: Monica  "MD Nikita;  Location: [facility]     LAPAROSCOPIC HERNIORRHAPHY INGUINAL BILATERAL  1/10/2014    Procedure: LAPAROSCOPIC HERNIORRHAPHY INGUINAL BILATERAL;  Laparoscopic Bilateral Inguinal Hernia Repair;  Surgeon: Nikita Anderson MD;  Location: [facility]       Social History     Tobacco Use     Smoking status: Former Smoker     Types: Cigarettes     Smokeless tobacco: Never Used   Substance Use Topics     Alcohol use: Yes     Alcohol/week: 1.0 standard drinks     Types: 1 Cans of beer per week     Comment: occ     Family History   Problem Relation Age of Onset     Diabetes Mother      Arthritis Mother      Diabetes Brother      Diabetes Brother          Current Outpatient Medications   Medication Sig Dispense Refill     aspirin 81 MG EC tablet Take 1 tablet (81 mg) by mouth daily 90 tablet 3     hydrocortisone (ANUSOL-HC) 2.5 % cream Place rectally 2 times daily as needed for hemorrhoids 30 g 5     atorvastatin (LIPITOR) 20 MG tablet Take 1 tablet (20 mg) by mouth daily (Patient not taking: Reported on 3/2/2020) 90 tablet 3     sildenafil (VIAGRA) 100 MG tablet Take 1 tablet (100 mg) by mouth daily as needed (erectile dysfunction) Take 30 min to 4 hours before intercourse.  Never use with nitroglycerin, terazosin or doxazosin. 8 tablet 11     No Known Allergies  BP Readings from Last 3 Encounters:   03/02/20 110/64   11/13/19 128/74   02/25/19 137/87    Wt Readings from Last 3 Encounters:   03/02/20 79.2 kg (174 lb 9.6 oz)   11/13/19 79.8 kg (175 lb 14.4 oz)   01/15/19 78.9 kg (174 lb)                    Reviewed and updated as needed this visit by Provider         Review of Systems   ROS COMP: Constitutional, HEENT, cardiovascular, pulmonary, gi and gu systems are negative, except as otherwise noted.      Objective    /64   Pulse 71   Temp 97.7  F (36.5  C) (Temporal)   Resp 16   Ht 1.638 m (5' 4.49\")   Wt 79.2 kg (174 lb 9.6 oz)   SpO2 98%   BMI 29.52 kg/m    Body mass index is 29.52 kg/m .  Physical Exam "   GENERAL: alert and no distress  MS: decreased ROM of neck, tenderness to palpation of muscles upper right cervical paraspinal under base of skull  SKIN: no suspicious lesions or rashes  NEURO: Normal strength and tone, mentation intact and speech normal  PSYCH: mentation appears normal, affect normal/bright    Diagnostic Test Results:  Labs reviewed in Epic        Assessment & Plan     1. Cervicalgia  Suspect strain of muscles on the right side of neck from sitting awkwardly in bed. Recommend the following to patient and to follow up if symptoms persist or worsen. He declined physical therapy.   Take ibuprofen 2-3 tablets (400-600 mg) three times daily for up to 10 days. Can alternate with Tylenol.    Apply heat for 20 minutes at a time 3-4 times a day. Can alternate with ice if you want.     May try tiger balm, brendan molina, gunnar hartman, aspercreme.    Try massage and progressive stretching.    Let me know if it's not getting better and I can put in a referral for physical therapy.    Avoid any strain to your neck such as heavy lifting.       No follow-ups on file.    EMMY Quach [facility]

## 2020-03-02 NOTE — PATIENT INSTRUCTIONS
Take ibuprofen 2-3 tablets (400-600 mg) three times daily for up to 10 days. Can alternate with Tylenol.    Apply heat for 20 minutes at a time 3-4 times a day. Can alternate with ice if you want.     May try tiger balm, brendan molina, gunnar hartman, asperector.    Try massage and progressive stretching.    Let me know if it's not getting better and I can put in a referral for physical therapy.    Avoid any strain to your neck such as heavy lifting.     Angelina Nava, KATHERIN-C

## 2020-09-14 ENCOUNTER — TELEPHONE (OUTPATIENT)
Dept: FAMILY MEDICINE | Facility: OTHER | Age: 70
End: 2020-09-14

## 2020-09-14 DIAGNOSIS — E78.5 HYPERLIPIDEMIA LDL GOAL <130: Primary | ICD-10-CM

## 2020-09-14 DIAGNOSIS — Z51.81 ENCOUNTER FOR THERAPEUTIC DRUG MONITORING: ICD-10-CM

## 2020-09-14 DIAGNOSIS — N52.1 ERECTILE DYSFUNCTION DUE TO DISEASES CLASSIFIED ELSEWHERE: ICD-10-CM

## 2020-09-14 NOTE — TELEPHONE ENCOUNTER
Reason for Call:  Other Lab Orders    Detailed comments: Patient calling and scheduling an annual Pe. Patient would like labs drawn prior to appt date 11/13/20. Please advise.    Phone Number Patient can be reached at: Cell number on file:    Telephone Information:   Mobile [phone]       Best Time: Any    Can we leave a detailed message on this number? YES    Call taken on 9/14/2020 at 10:32 AM by Claire Patel

## 2020-11-05 DIAGNOSIS — E78.5 HYPERLIPIDEMIA LDL GOAL <130: ICD-10-CM

## 2020-11-05 RX ORDER — ATORVASTATIN CALCIUM 20 MG/1
TABLET, FILM COATED ORAL
Qty: 90 TABLET | Refills: 3 | Status: SHIPPED | OUTPATIENT
Start: 2020-11-05 | End: 2021-11-17

## 2020-11-06 DIAGNOSIS — Z51.81 ENCOUNTER FOR THERAPEUTIC DRUG MONITORING: ICD-10-CM

## 2020-11-06 DIAGNOSIS — N52.1 ERECTILE DYSFUNCTION DUE TO DISEASES CLASSIFIED ELSEWHERE: ICD-10-CM

## 2020-11-06 DIAGNOSIS — E78.5 HYPERLIPIDEMIA LDL GOAL <130: ICD-10-CM

## 2020-11-06 LAB
ALBUMIN SERPL-MCNC: 3.6 G/DL (ref 3.4–5)
ALP SERPL-CCNC: 91 U/L (ref 40–150)
ALT SERPL W P-5'-P-CCNC: 30 U/L (ref 0–70)
ANION GAP SERPL CALCULATED.3IONS-SCNC: 6 MMOL/L (ref 3–14)
AST SERPL W P-5'-P-CCNC: 22 U/L (ref 0–45)
BILIRUB SERPL-MCNC: 0.9 MG/DL (ref 0.2–1.3)
BUN SERPL-MCNC: 17 MG/DL (ref 7–30)
CALCIUM SERPL-MCNC: 9.1 MG/DL (ref 8.5–10.1)
CHLORIDE SERPL-SCNC: 108 MMOL/L (ref 94–109)
CHOLEST SERPL-MCNC: 122 MG/DL
CO2 SERPL-SCNC: 27 MMOL/L (ref 20–32)
CREAT SERPL-MCNC: 0.96 MG/DL (ref 0.66–1.25)
ERYTHROCYTE [DISTWIDTH] IN BLOOD BY AUTOMATED COUNT: 12.7 % (ref 10–15)
GFR SERPL CREATININE-BSD FRML MDRD: 79 ML/MIN/{1.73_M2}
GLUCOSE SERPL-MCNC: 90 MG/DL (ref 70–99)
HCT VFR BLD AUTO: 44.7 % (ref 40–53)
HDLC SERPL-MCNC: 80 MG/DL
HGB BLD-MCNC: 15.4 G/DL (ref 13.3–17.7)
LDLC SERPL CALC-MCNC: 25 MG/DL
MCH RBC QN AUTO: 33 PG (ref 26.5–33)
MCHC RBC AUTO-ENTMCNC: 34.5 G/DL (ref 31.5–36.5)
MCV RBC AUTO: 96 FL (ref 78–100)
NONHDLC SERPL-MCNC: 42 MG/DL
PLATELET # BLD AUTO: 183 10E9/L (ref 150–450)
POTASSIUM SERPL-SCNC: 4 MMOL/L (ref 3.4–5.3)
PROT SERPL-MCNC: 7 G/DL (ref 6.8–8.8)
RBC # BLD AUTO: 4.67 10E12/L (ref 4.4–5.9)
SODIUM SERPL-SCNC: 141 MMOL/L (ref 133–144)
TRIGL SERPL-MCNC: 84 MG/DL
TSH SERPL DL<=0.005 MIU/L-ACNC: 1.76 MU/L (ref 0.4–4)
WBC # BLD AUTO: 7.1 10E9/L (ref 4–11)

## 2020-11-06 PROCEDURE — 84443 ASSAY THYROID STIM HORMONE: CPT | Performed by: FAMILY MEDICINE

## 2020-11-06 PROCEDURE — 85027 COMPLETE CBC AUTOMATED: CPT | Performed by: FAMILY MEDICINE

## 2020-11-06 PROCEDURE — 80061 LIPID PANEL: CPT | Performed by: FAMILY MEDICINE

## 2020-11-06 PROCEDURE — 36415 COLL VENOUS BLD VENIPUNCTURE: CPT | Performed by: FAMILY MEDICINE

## 2020-11-06 PROCEDURE — 80053 COMPREHEN METABOLIC PANEL: CPT | Performed by: FAMILY MEDICINE

## 2020-11-06 NOTE — TELEPHONE ENCOUNTER
Prescription approved per [facility] [place].  Susi Thurston RN  November 5, 2020

## 2020-11-12 ASSESSMENT — ENCOUNTER SYMPTOMS
HEARTBURN: 0
DYSURIA: 0
MYALGIAS: 1
PARESTHESIAS: 0
HEADACHES: 0
FEVER: 0
FREQUENCY: 0
ABDOMINAL PAIN: 0
HEMATOCHEZIA: 0
COUGH: 0
EYE PAIN: 0
ARTHRALGIAS: 1
PALPITATIONS: 0
DIZZINESS: 0
NERVOUS/ANXIOUS: 0
WEAKNESS: 0
CONSTIPATION: 0
SHORTNESS OF BREATH: 0
DIARRHEA: 0
NAUSEA: 0
CHILLS: 0
SORE THROAT: 0
HEMATURIA: 0
JOINT SWELLING: 0

## 2020-11-12 ASSESSMENT — ACTIVITIES OF DAILY LIVING (ADL): CURRENT_FUNCTION: NO ASSISTANCE NEEDED

## 2020-11-12 NOTE — PROGRESS NOTES
"SUBJECTIVE:   Kameron Whitley is a 70 year old male who presents for Preventive Visit.      Patient has been advised of split billing requirements and indicates understanding: yes  Are you in the first 12 months of your Medicare coverage?  yes    Healthy Habits:     In general, how would you rate your overall health?  Good    Frequency of exercise:  4-5 days/week    Duration of exercise:  30-45 minutes    Do you usually eat at least 4 servings of fruit and vegetables a day, include whole grains    & fiber and avoid regularly eating high fat or \"junk\" foods?  Yes    Taking medications regularly:  Yes    Medication side effects:  Lightheadedness    Ability to successfully perform activities of daily living:  No assistance needed    Home Safety:  Lack of grab bars in the bathroom    Hearing Impairment:  Difficulty following a conversation in a noisy restaurant or crowded room and difficulty understanding soft or whispered speech    In the past 6 months, have you been bothered by leaking of urine?  No    In general, how would you rate your overall mental or emotional health?  Excellent      PHQ-2 Total Score: 0    Additional concerns today:  No    Do you feel safe in your environment? Yes    Have you ever done Advance Care Planning? (For example, a Health Directive, POLST, or a discussion with a medical provider or your loved ones about your wishes): No, advance care planning information given to patient to review.  Patient plans to discuss their wishes with loved ones or provider.        He noted a big decrease in his aches after changing the cholesterol medication.  Tolerating the atorvastatin well.      Does get an occasional sharp pain in his hip.  Feels like a pulling sensation.      He does note that Viagra doesn't work as well as it used to.  Wonders about other options.        Fall risk  Fallen 2 or more times in the past year?: No  Any fall with injury in the past year?: No    Cognitive Screening   1) Repeat 3 " items (Leader, Season, Table)    2) Clock draw: NORMAL  3) 3 item recall: Recalls 3 objects  Results: 3 items recalled: COGNITIVE IMPAIRMENT LESS LIKELY    Mini-CogTM Copyright [employer]AL [employer]. Licensed by the author for use in [facility]; reprinted with permission (isrrael@[facility]). All rights reserved.      Do you have sleep apnea, excessive snoring or daytime drowsiness?: no    Reviewed and updated as needed this visit by clinical staff  Tobacco  Allergies  Meds              Reviewed and updated as needed this visit by Provider                Social History     Tobacco Use     Smoking status: Former Smoker     Types: Cigarettes     Smokeless tobacco: Never Used   Substance Use Topics     Alcohol use: Yes     Alcohol/week: 1.0 standard drinks     Types: 1 Cans of beer per week     Comment: occ         Alcohol Use 11/12/2020   Prescreen: >3 drinks/day or >7 drinks/week? No   Prescreen: >3 drinks/day or >7 drinks/week? -               Current providers sharing in care for this patient include:   Patient Care Team:  Nicola Andrews PA-C as PCP - General (Physician Assistant)  Arvind Mack MD as Assigned PCP    The following health maintenance items are reviewed in Epic and correct as of today:  Health Maintenance   Topic Date Due     ZOSTER IMMUNIZATION (2 of 3) 08/17/2017     FALL RISK ASSESSMENT  11/13/2020     MEDICARE ANNUAL WELLNESS VISIT  11/13/2021     COLORECTAL CANCER SCREENING  02/25/2024     DTAP/TDAP/TD IMMUNIZATION (3 - Td) 03/06/2025     LIPID  11/06/2025     ADVANCE CARE PLANNING  11/13/2025     HEPATITIS C SCREENING  Completed     PHQ-2  Completed     INFLUENZA VACCINE  Completed     Pneumococcal Vaccine: 65+ Years  Completed     AORTIC ANEURYSM SCREENING (SYSTEM ASSIGNED)  Completed     Pneumococcal Vaccine: Pediatrics (0 to 5 Years) and At-Risk Patients (6 to 64 Years)  Aged Out     IPV IMMUNIZATION  Aged Out     MENINGITIS IMMUNIZATION  Aged Out     HEPATITIS B IMMUNIZATION  Aged Out      BP Readings from Last 3 Encounters:   11/13/20 120/78   03/02/20 110/64   11/13/19 128/74    Wt Readings from Last 3 Encounters:   11/13/20 81.2 kg (179 lb)   03/02/20 79.2 kg (174 lb 9.6 oz)   11/13/19 79.8 kg (175 lb 14.4 oz)                  Patient Active Problem List   Diagnosis     Impacted cerumen     Groin mass     ED (erectile dysfunction)     Erectile dysfunction due to diseases classified elsewhere     Cholelithiasis with acute cholecystitis without biliary obstruction     Hyperlipidemia LDL goal <130     Elevated blood pressure reading without diagnosis of hypertension     Disturbance of skin sensation     [place]-neck deformity of finger     Sinus bradycardia     Past Surgical History:   Procedure Laterality Date     ARTHRODESIS FINGER(S) Right 2/8/2019    Procedure: Right index finger fusion;  Surgeon: EMILIANA Aguilar MD;  Location: [facility] OR     CARPAL TUNNEL RELEASE RT/LT  1/2006    right hand     COLONOSCOPY  4/2009    Normal-f/u 10 years (At [facility])     COLONOSCOPY N/A 2/25/2019    Procedure: COMBINED COLONOSCOPY, SINGLE POLYPECTOMY BY BIOPSY;  Surgeon: Brendan Nunes MD;  Location: [facility]     LAPAROSCOPIC CHOLECYSTECTOMY N/A 10/5/2016    Procedure: LAPAROSCOPIC CHOLECYSTECTOMY;  Surgeon: Nikita Anderson MD;  Location: [facility]     LAPAROSCOPIC HERNIORRHAPHY INGUINAL BILATERAL  1/10/2014    Procedure: LAPAROSCOPIC HERNIORRHAPHY INGUINAL BILATERAL;  Laparoscopic Bilateral Inguinal Hernia Repair;  Surgeon: Nikita Anderson MD;  Location: [facility] OR       Social History     Tobacco Use     Smoking status: Former Smoker     Types: Cigarettes     Smokeless tobacco: Never Used   Substance Use Topics     Alcohol use: Yes     Alcohol/week: 1.0 standard drinks     Types: 1 Cans of beer per week     Comment: occ     Family History   Problem Relation Age of Onset     Diabetes Mother      Arthritis Mother      Diabetes Brother      Diabetes Brother          Current Outpatient Medications   Medication Sig  "Dispense Refill     aspirin 81 MG EC tablet Take 1 tablet (81 mg) by mouth daily 90 tablet 3     atorvastatin (LIPITOR) 20 MG tablet TAKE ONE TABLET BY MOUTH ONCE DAILY 90 tablet 3     sildenafil (VIAGRA) 100 MG tablet Take 1 tablet (100 mg) by mouth daily as needed (erectile dysfunction) Take 30 min to 4 hours before intercourse.  Never use with nitroglycerin, terazosin or doxazosin. 8 tablet 11     tadalafil (CIALIS) 20 MG tablet Take 1 tablet (20 mg) by mouth daily as needed (erectile dysfunction) 30 tablet 1     No Known Allergies  Recent Labs   Lab Test 11/06/20  1045 11/11/19  0804 01/15/19  0920 11/16/18  0946   LDL 25 49  --  102*   HDL 80 77  --  78   TRIG 84 78  --  91   ALT 30 30 33  --    CR 0.96 1.16 1.02  --    GFRESTIMATED 79 63 74  --    GFRESTBLACK >90 73 86  --    POTASSIUM 4.0 4.0 4.5  --    TSH 1.76  --   --   --           Review of Systems   Constitutional: Negative for chills and fever.   HENT: Positive for hearing loss. Negative for congestion, ear pain and sore throat.    Eyes: Negative for pain and visual disturbance.   Respiratory: Negative for cough and shortness of breath.    Cardiovascular: Negative for chest pain, palpitations and peripheral edema.   Gastrointestinal: Negative for abdominal pain, constipation, diarrhea, heartburn, hematochezia and nausea.   Genitourinary: Positive for impotence. Negative for discharge, dysuria, frequency, genital sores, hematuria and urgency.   Musculoskeletal: Positive for arthralgias and myalgias. Negative for joint swelling.   Skin: Negative for rash.   Neurological: Negative for dizziness, weakness, headaches and paresthesias.   Psychiatric/Behavioral: Negative for mood changes. The patient is not nervous/anxious.          OBJECTIVE:   /78   Pulse 60   Temp 98  F (36.7  C) (Temporal)   Resp 16   Ht 1.626 m (5' 4\")   Wt 81.2 kg (179 lb)   SpO2 96%   BMI 30.73 kg/m   Estimated body mass index is 30.73 kg/m  as calculated from the " "following:    Height as of this encounter: 1.626 m (5' 4\").    Weight as of this encounter: 81.2 kg (179 lb).  Physical Exam  GENERAL: healthy, alert and no distress  EYES: Eyes grossly normal to inspection, PERRL and conjunctivae and sclerae normal  HENT: ear canals and TM's normal, nose and mouth without ulcers or lesions  NECK: no adenopathy, no asymmetry, masses, or scars and thyroid normal to palpation  RESP: lungs clear to auscultation - no rales, rhonchi or wheezes  CV: regular rate and rhythm, normal S1 S2, no S3 or S4, no murmur, click or rub, no peripheral edema and peripheral pulses strong  ABDOMEN: soft, nontender, no hepatosplenomegaly, no masses and bowel sounds normal  MS: no gross musculoskeletal defects noted, no edema  SKIN: no suspicious lesions or rashes and keratoses - seborrheic and one that might be an AK on right forehead, very small.  Some lentigines  NEURO: Normal strength and tone, mentation intact and speech normal  PSYCH: mentation appears normal, affect normal/bright    Diagnostic Test Results:  Labs reviewed in Epic      ASSESSMENT / PLAN:       ICD-10-CM    1. Encounter for Medicare annual wellness exam  Z00.00    2. Hyperlipidemia LDL goal <130  E78.5    3. Erectile dysfunction due to diseases classified elsewhere  N52.1 tadalafil (CIALIS) 20 MG tablet     DISCONTINUED: tadalafil (CIALIS) 20 MG tablet   4. Seborrheic keratoses  L82.1    5. Lentigo  L81.4      1.  Reviewed recommended screenings and ordered appropriate testing for pt's risks and per pt's request(s).   2.  Currently controlled.  Will continue current regimen of Lipitor.  6 to 12 months.  3.  Discussed options for treatment.  Patient would like to try Cialis instead of Viagra.  Prescription was given for this.  Follow-up as needed.  4, 5.  Reassured him about benign nature of these.  Will need to watch for evidence of actinic keratoses given the marker of sun exposure with lentigines present.    Portions of this note " "were completed using Dragon dictation software.  Although reviewed, there may be typographical and other inadvertent errors that remain.           Patient has been advised of split billing requirements and indicates understanding: Yes  COUNSELING:  Reviewed preventive health counseling, as reflected in patient instructions       Regular exercise       Healthy diet/nutrition       Prostate cancer screening       Osteoporosis Prevention/Bone Health       The ASCVD Risk score (Angy PRICE Jr., et al., 2013) failed to calculate for the following reasons:    The valid total cholesterol range is 130 to 320 mg/dL    Estimated body mass index is 30.73 kg/m  as calculated from the following:    Height as of this encounter: 1.626 m (5' 4\").    Weight as of this encounter: 81.2 kg (179 lb).    Weight management plan: Discussed healthy diet and exercise guidelines    He reports that he has quit smoking. His smoking use included cigarettes. He has never used smokeless tobacco.      Appropriate preventive services were discussed with this patient, including applicable screening as appropriate for cardiovascular disease, diabetes, osteopenia/osteoporosis, and glaucoma.  As appropriate for age/gender, discussed screening for colorectal cancer, prostate cancer, breast cancer, and cervical cancer. Checklist reviewing preventive services available has been given to the patient.    Reviewed patients plan of care and provided an AVS. The Intermediate Care Plan ( asthma action plan, low back pain action plan, and migraine action plan) for Kameron meets the Care Plan requirement. This Care Plan has been established and reviewed with the Patient.    Counseling Resources:  ATP IV Guidelines  Pooled Cohorts Equation Calculator  Breast Cancer Risk Calculator  Breast Cancer: Medication to Reduce Risk  FRAX Risk Assessment  ICSI Preventive Guidelines  Dietary Guidelines for Americans, 2010  USDA's MyPlate  ASA Prophylaxis  Lung CA Screening    Arvind" Les Mack MD, MD  [facility]    Identified Health Risks:    The patient was provided with written information regarding signs of hearing loss.

## 2020-11-12 NOTE — PATIENT INSTRUCTIONS
OK  To change to Cialis to see if that works better.  This might be less expensive locally too.  You can check around.    If not, we can consider other options.    Contact us or return if questions or concerns.      Have a nice day!    Dr. Mack       Preventive Health Recommendations:     See your health care provider every year to    Review health changes.     Discuss preventive care.      Review your medicines if your doctor has prescribed any.      Talk with your health care provider about whether you should have a test to screen for prostate cancer (PSA).    Every 3 years, have a diabetes test (fasting glucose). If you are at risk for diabetes, you should have this test more often.    Every 5 years, have a cholesterol test. Have this test more often if you are at risk for high cholesterol or heart disease.     Every 10 years, have a colonoscopy. Or, have a yearly FIT test (stool test). These exams will check for colon cancer.    Talk to with your health care provider about screening for Abdominal Aortic Aneurysm if you have a family history of AAA or have a history of smoking.    Shots:     Get a flu shot each year.     Get a tetanus shot every 10 years.     Talk to your doctor about your pneumonia vaccines. There are now two you should receive - Pneumovax (PPSV 23) and Prevnar (PCV 13).     Talk to your pharmacist about a shingles vaccine.     Talk to your doctor about the hepatitis B vaccine.  Nutrition:     Eat at least 5 servings of fruits and vegetables each day.     Eat whole-grain bread, whole-wheat pasta and brown rice instead of white grains and rice.     Get adequate Calcium and Vitamin D.   Lifestyle    Exercise for at least 150 minutes a week (30 minutes a day, 5 days a week). This will help you control your weight and prevent disease.     Limit alcohol to one drink per day.     No smoking.     Wear sunscreen to prevent skin cancer.    See your dentist every six months for an exam and  cleaning.    See your eye doctor every 1 to 2 years to screen for conditions such as glaucoma, macular degeneration, cataracts, etc.    Personalized Prevention Plan  You are due for the preventive services outlined below.  Your care team is available to assist you in scheduling these services.  If you have already completed any of these items, please share that information with your care team to update in your medical record.  Health Maintenance Due   Topic Date Due     Zoster (Shingles) Vaccine (2 of 3) 08/17/2017     Annual Wellness Visit  11/13/2020     FALL RISK ASSESSMENT  11/13/2020     Patient Education   Personalized Prevention Plan  You are due for the preventive services outlined below.  Your care team is available to assist you in scheduling these services.  If you have already completed any of these items, please share that information with your care team to update in your medical record.  Health Maintenance Due   Topic Date Due     Zoster (Shingles) Vaccine (2 of 3) 08/17/2017     FALL RISK ASSESSMENT  11/13/2020       Signs of Hearing Loss      Hearing much better with one ear can be a sign of hearing loss.   Hearing loss is a problem shared by many people. In fact, it is one of the most common health problems, particularly as people age. Most people age 65 and older have some hearing loss. By age 80, almost everyone does. Hearing loss often occurs slowly over the years. So you may not realize your hearing has gotten worse.  Have your hearing checked  Call your healthcare provider if you:    Have to strain to hear normal conversation    Have to watch other people s faces very carefully to follow what they re saying    Need to ask people to repeat what they ve said    Often misunderstand what people are saying    Turn the volume of the television or radio up so high that others complain    Feel that people are mumbling when they re talking to you    Find that the effort to hear leaves you feeling tired  and irritated    Notice, when using the phone, that you hear better with one ear than the other  [employer] last reviewed this educational content on 1/1/2020 2000-2020 [employer]. [address] [place] [address], [place] [address]. All rights reserved. This information is not intended as a substitute for professional medical care. Always follow your healthcare professional's instructions.

## 2020-11-13 ENCOUNTER — OFFICE VISIT (OUTPATIENT)
Dept: FAMILY MEDICINE | Facility: OTHER | Age: 70
End: 2020-11-13
Payer: COMMERCIAL

## 2020-11-13 VITALS
HEART RATE: 60 BPM | SYSTOLIC BLOOD PRESSURE: 120 MMHG | BODY MASS INDEX: 30.56 KG/M2 | WEIGHT: 179 LBS | TEMPERATURE: 98 F | OXYGEN SATURATION: 96 % | DIASTOLIC BLOOD PRESSURE: 78 MMHG | HEIGHT: 64 IN | RESPIRATION RATE: 16 BRPM

## 2020-11-13 DIAGNOSIS — L81.4 LENTIGO: ICD-10-CM

## 2020-11-13 DIAGNOSIS — N52.1 ERECTILE DYSFUNCTION DUE TO DISEASES CLASSIFIED ELSEWHERE: ICD-10-CM

## 2020-11-13 DIAGNOSIS — Z00.00 ENCOUNTER FOR MEDICARE ANNUAL WELLNESS EXAM: Primary | ICD-10-CM

## 2020-11-13 DIAGNOSIS — L82.1 SEBORRHEIC KERATOSES: ICD-10-CM

## 2020-11-13 DIAGNOSIS — E78.5 HYPERLIPIDEMIA LDL GOAL <130: ICD-10-CM

## 2020-11-13 PROCEDURE — 99397 PER PM REEVAL EST PAT 65+ YR: CPT | Performed by: FAMILY MEDICINE

## 2020-11-13 PROCEDURE — 99213 OFFICE O/P EST LOW 20 MIN: CPT | Mod: 25 | Performed by: FAMILY MEDICINE

## 2020-11-13 RX ORDER — TADALAFIL 20 MG/1
20 TABLET ORAL DAILY PRN
Qty: 30 TABLET | Refills: 1 | Status: SHIPPED | OUTPATIENT
Start: 2020-11-13 | End: 2020-11-13

## 2020-11-13 RX ORDER — TADALAFIL 20 MG/1
20 TABLET ORAL DAILY PRN
Qty: 30 TABLET | Refills: 1 | Status: SHIPPED | OUTPATIENT
Start: 2020-11-13 | End: 2021-11-17

## 2020-11-13 ASSESSMENT — ENCOUNTER SYMPTOMS
EYE PAIN: 0
HEMATURIA: 0
SHORTNESS OF BREATH: 0
SORE THROAT: 0
NAUSEA: 0
FREQUENCY: 0
NERVOUS/ANXIOUS: 0
DIARRHEA: 0
CONSTIPATION: 0
JOINT SWELLING: 0
COUGH: 0
MYALGIAS: 1
ARTHRALGIAS: 1
FEVER: 0
HEADACHES: 0
PARESTHESIAS: 0
WEAKNESS: 0
HEMATOCHEZIA: 0
ABDOMINAL PAIN: 0
DYSURIA: 0
CHILLS: 0
HEARTBURN: 0
PALPITATIONS: 0
DIZZINESS: 0

## 2020-11-13 ASSESSMENT — ACTIVITIES OF DAILY LIVING (ADL): CURRENT_FUNCTION: NO ASSISTANCE NEEDED

## 2020-11-13 ASSESSMENT — MIFFLIN-ST. JEOR: SCORE: 1472.94

## 2020-12-14 ENCOUNTER — MYC MEDICAL ADVICE (OUTPATIENT)
Dept: FAMILY MEDICINE | Facility: OTHER | Age: 70
End: 2020-12-14

## 2020-12-16 ENCOUNTER — MYC MEDICAL ADVICE (OUTPATIENT)
Dept: FAMILY MEDICINE | Facility: OTHER | Age: 70
End: 2020-12-16

## 2020-12-17 ENCOUNTER — MYC MEDICAL ADVICE (OUTPATIENT)
Dept: FAMILY MEDICINE | Facility: OTHER | Age: 70
End: 2020-12-17

## 2020-12-17 ENCOUNTER — TELEPHONE (OUTPATIENT)
Dept: FAMILY MEDICINE | Facility: OTHER | Age: 70
End: 2020-12-17

## 2020-12-17 DIAGNOSIS — R82.90 ABNORMAL URINE ODOR: Primary | ICD-10-CM

## 2020-12-17 NOTE — TELEPHONE ENCOUNTER
Pt is coming into lab tomorrow at 1130 to leave a urine sample. Please place order.    Thank you,  Kalli

## 2020-12-18 DIAGNOSIS — R82.90 ABNORMAL URINE ODOR: ICD-10-CM

## 2020-12-18 LAB
ALBUMIN UR-MCNC: NEGATIVE MG/DL
APPEARANCE UR: CLEAR
BILIRUB UR QL STRIP: NEGATIVE
COLOR UR AUTO: YELLOW
GLUCOSE UR STRIP-MCNC: NEGATIVE MG/DL
HGB UR QL STRIP: NEGATIVE
KETONES UR STRIP-MCNC: NEGATIVE MG/DL
LEUKOCYTE ESTERASE UR QL STRIP: NEGATIVE
NITRATE UR QL: NEGATIVE
PH UR STRIP: 7 PH (ref 5–7)
SOURCE: NORMAL
SP GR UR STRIP: 1.02 (ref 1–1.03)
UROBILINOGEN UR STRIP-ACNC: 0.2 EU/DL (ref 0.2–1)

## 2020-12-18 PROCEDURE — 81003 URINALYSIS AUTO W/O SCOPE: CPT | Performed by: FAMILY MEDICINE

## 2021-10-02 ENCOUNTER — HEALTH MAINTENANCE LETTER (OUTPATIENT)
Age: 71
End: 2021-10-02

## 2021-11-02 ENCOUNTER — MYC MEDICAL ADVICE (OUTPATIENT)
Dept: FAMILY MEDICINE | Facility: OTHER | Age: 71
End: 2021-11-02

## 2021-11-02 DIAGNOSIS — E78.5 HYPERLIPIDEMIA LDL GOAL <130: Primary | ICD-10-CM

## 2021-11-02 NOTE — TELEPHONE ENCOUNTER
Reason for Call: Request for an order or referral:    Order or referral being requested:  Lab orders for his annual physical.   He would like to come in for lab work prior to his physical appointment on 11-17-21 with Dr Mack.  Thank you.    Date needed:  By 11-12-21    Has the patient been seen by the PCP for this problem? annual physical  on 11-17-21    Additional comments:  Patient is already scheduled for lab on 11-12 and physical on 11-17.  Thank you    Phone number Patient can be reached at:  Cell number on file:    Telephone Information:   Mobile [phone]       Best Time:  any    Can we leave a detailed message on this number?  YES    Call taken on 11/2/2021 at 10:34 AM by Caroline Lowry

## 2021-11-12 ENCOUNTER — LAB (OUTPATIENT)
Dept: LAB | Facility: OTHER | Age: 71
End: 2021-11-12
Payer: COMMERCIAL

## 2021-11-12 DIAGNOSIS — E78.5 HYPERLIPIDEMIA LDL GOAL <130: ICD-10-CM

## 2021-11-12 LAB
ALBUMIN SERPL-MCNC: 3.6 G/DL (ref 3.4–5)
ALP SERPL-CCNC: 88 U/L (ref 40–150)
ALT SERPL W P-5'-P-CCNC: 32 U/L (ref 0–70)
ANION GAP SERPL CALCULATED.3IONS-SCNC: 4 MMOL/L (ref 3–14)
AST SERPL W P-5'-P-CCNC: 23 U/L (ref 0–45)
BILIRUB SERPL-MCNC: 0.8 MG/DL (ref 0.2–1.3)
BUN SERPL-MCNC: 18 MG/DL (ref 7–30)
CALCIUM SERPL-MCNC: 8.5 MG/DL (ref 8.5–10.1)
CHLORIDE BLD-SCNC: 111 MMOL/L (ref 94–109)
CHOLEST SERPL-MCNC: 106 MG/DL
CO2 SERPL-SCNC: 27 MMOL/L (ref 20–32)
CREAT SERPL-MCNC: 0.95 MG/DL (ref 0.66–1.25)
FASTING STATUS PATIENT QL REPORTED: YES
GFR SERPL CREATININE-BSD FRML MDRD: 79 ML/MIN/1.73M2
GLUCOSE BLD-MCNC: 106 MG/DL (ref 70–99)
HDLC SERPL-MCNC: 71 MG/DL
LDLC SERPL CALC-MCNC: 25 MG/DL
NONHDLC SERPL-MCNC: 35 MG/DL
POTASSIUM BLD-SCNC: 4.3 MMOL/L (ref 3.4–5.3)
PROT SERPL-MCNC: 6.9 G/DL (ref 6.8–8.8)
SODIUM SERPL-SCNC: 142 MMOL/L (ref 133–144)
TRIGL SERPL-MCNC: 50 MG/DL

## 2021-11-12 PROCEDURE — 80053 COMPREHEN METABOLIC PANEL: CPT

## 2021-11-12 PROCEDURE — 80061 LIPID PANEL: CPT

## 2021-11-12 PROCEDURE — 36415 COLL VENOUS BLD VENIPUNCTURE: CPT

## 2021-11-12 NOTE — RESULT ENCOUNTER NOTE
Kameron,    All of your labs were normal for you except your glucose.    Your blood sugar is in the prediabetic range.  Can try to prevent this from getting worse by exercising regularly and controlling weight and diet.      Have a nice day!    Dr. Mack

## 2021-11-16 NOTE — PROGRESS NOTES
"SUBJECTIVE:   Kameron Whitley is a 71 year old male who presents for Preventive Visit.    Pt reports pain in left hip that started a year ago that is worse with abduction. Pain started out sharp and transitioned to a sore pain that \"felt like a pinched nerve\". He does get pain in the right hip as well, however pain is greater in L>R. He feels pain in his hips more while he is sleeping on his side. Sitting upright in chair without legs elevated make it worse. Tylenol/ibuprofen does help the pain. He takes 200mg PRN very rarely of ibuprofen for the pian and that does help.     He also reports that he notices a musty smell from scrotum that has been going on for a year. He did have a UA done back in Dec 2020 that was negative however he continues to notice the smell. Denies any redness. Does notice more sensitive scrotum.     Blood pressure has been in the low 140s/high 130s at home, he has had increase in weight recently. Discussed healthy lifestyle habits.     Patient has been advised of split billing requirements and indicates understanding: Yes   Are you in the first 12 months of your Medicare coverage?  No    Healthy Habits:     In general, how would you rate your overall health?  Good    Frequency of exercise:  4-5 days/week    Duration of exercise:  30-45 minutes    Do you usually eat at least 4 servings of fruit and vegetables a day, include whole grains    & fiber and avoid regularly eating high fat or \"junk\" foods?  No    Taking medications regularly:  Yes    Medication side effects:  Lightheadedness    Ability to successfully perform activities of daily living:  No assistance needed    Home Safety:  No safety concerns identified    Hearing Impairment:  Difficulty following a conversation in a noisy restaurant or crowded room, feel that people are mumbling or not speaking clearly, find that men's voices are easier to understand than woman's and difficulty understanding soft or whispered speech    In the past " 6 months, have you been bothered by leaking of urine?  No    In general, how would you rate your overall mental or emotional health?  Excellent      PHQ-2 Total Score: 0    Additional concerns today:  Yes    Do you feel safe in your environment? Yes    Have you ever done Advance Care Planning? (For example, a Health Directive, POLST, or a discussion with a medical provider or your loved ones about your wishes): No, advance care planning information given to patient to review.  Patient declined advance care planning discussion at this time.       Fall risk  Fallen 2 or more times in the past year?: No  Any fall with injury in the past year?: No    Cognitive Screening   1) Repeat 3 items (Leader, Season, Table)    2) Clock draw: NORMAL  3) 3 item recall: Recalls 3 objects  Results: 3 items recalled: COGNITIVE IMPAIRMENT LESS LIKELY    Mini-CogTM Copyright [employer]. Licensed by the author for use in [employer]; reprinted with permission (isrrael@[email or web address]). All rights reserved.      Do you have sleep apnea, excessive snoring or daytime drowsiness?: no    Reviewed and updated as needed this visit by clinical staff  Tobacco  Allergies  Meds   Med Hx  Surg Hx  Fam Hx  Soc Hx       Reviewed and updated as needed this visit by Provider               Social History     Tobacco Use     Smoking status: Former Smoker     Types: Cigarettes     Smokeless tobacco: Never Used     Tobacco comment: quit in his 30s   Substance Use Topics     Alcohol use: Yes     Alcohol/week: 1.0 standard drink     Types: 1 Cans of beer per week     Comment: occ     If you drink alcohol do you typically have >3 drinks per day or >7 drinks per week? No    Alcohol Use 11/17/2021   Prescreen: >3 drinks/day or >7 drinks/week? No   Prescreen: >3 drinks/day or >7 drinks/week? -               Current providers sharing in care for this patient include:     Patient Care Team:  iNcola Andrews PA-C as PCP - General (Physician Assistant)  [place]  Arvind Saldana MD as Assigned PCP    The following health maintenance items are reviewed in Epic and correct as of today:  Health Maintenance Due   Topic Date Due     ANNUAL REVIEW OF HM ORDERS  Never done     ZOSTER IMMUNIZATION (2 of 3) 08/17/2017     FALL RISK ASSESSMENT  11/13/2021     BP Readings from Last 3 Encounters:   11/17/21 132/80   11/13/20 120/78   03/02/20 110/64    Wt Readings from Last 3 Encounters:   11/17/21 80.5 kg (177 lb 8 oz)   11/13/20 81.2 kg (179 lb)   03/02/20 79.2 kg (174 lb 9.6 oz)                  Patient Active Problem List   Diagnosis     Impacted cerumen     Groin mass     ED (erectile dysfunction)     Erectile dysfunction due to diseases classified elsewhere     Cholelithiasis with acute cholecystitis without biliary obstruction     Hyperlipidemia LDL goal <130     Elevated blood pressure reading without diagnosis of hypertension     Disturbance of skin sensation     Tenaha-neck deformity of finger     Sinus bradycardia     Benign essential hypertension with target blood pressure below 140/90     Past Surgical History:   Procedure Laterality Date     ARTHRODESIS FINGER(S) Right 2/8/2019    Procedure: Right index finger fusion;  Surgeon: EMILIANA Aguilar MD;  Location: [facility] OR     CARPAL TUNNEL RELEASE RT/LT  1/2006    right hand     COLONOSCOPY  4/2009    Normal-f/u 10 years ([facility])     COLONOSCOPY N/A 2/25/2019    Procedure: COMBINED COLONOSCOPY, SINGLE POLYPECTOMY BY BIOPSY;  Surgeon: Brendan Nunes MD;  Location:  [facility]     LAPAROSCOPIC CHOLECYSTECTOMY N/A 10/5/2016    Procedure: LAPAROSCOPIC CHOLECYSTECTOMY;  Surgeon: Nikita Anderson MD;  Location:  OR     LAPAROSCOPIC HERNIORRHAPHY INGUINAL BILATERAL  1/10/2014    Procedure: LAPAROSCOPIC HERNIORRHAPHY INGUINAL BILATERAL;  Laparoscopic Bilateral Inguinal Hernia Repair;  Surgeon: Nikita Anderson MD;  Location:  OR       Social History     Tobacco Use     Smoking status: Former Smoker     Types: Cigarettes  "    Smokeless tobacco: Never Used     Tobacco comment: quit in his 30s   Substance Use Topics     Alcohol use: Yes     Alcohol/week: 1.0 standard drink     Types: 1 Cans of beer per week     Comment: occ     Family History   Problem Relation Age of Onset     Diabetes Mother      Arthritis Mother      Diabetes Brother      Diabetes Brother          Current Outpatient Medications   Medication Sig Dispense Refill     aspirin 81 MG EC tablet Take 1 tablet (81 mg) by mouth daily 90 tablet 3     atorvastatin (LIPITOR) 20 MG tablet TAKE ONE TABLET BY MOUTH ONCE DAILY 90 tablet 3     No Known Allergies  Recent Labs   Lab Test 11/12/21  0758 11/06/20  1045 11/11/19  0804   LDL 25 25 49   HDL 71 80 77   TRIG 50 84 78   ALT 32 30 30   CR 0.95 0.96 1.16   GFRESTIMATED 79 79 63   GFRESTBLACK  --  >90 73   POTASSIUM 4.3 4.0 4.0   TSH  --  1.76  --               Review of Systems   Constitutional: Negative for chills and fever.   HENT: Positive for hearing loss. Negative for congestion, ear pain and sore throat.    Eyes: Negative for pain and visual disturbance.   Respiratory: Negative for cough and shortness of breath.    Cardiovascular: Negative for chest pain, palpitations and peripheral edema.   Gastrointestinal: Negative for abdominal pain, constipation, diarrhea, heartburn, hematochezia and nausea.   Genitourinary: Positive for impotence and urgency. Negative for dysuria, frequency, genital sores, hematuria and penile discharge.   Musculoskeletal: Positive for arthralgias. Negative for joint swelling and myalgias.   Skin: Negative for rash.   Neurological: Negative for dizziness, weakness, headaches and paresthesias.   Psychiatric/Behavioral: Negative for mood changes. The patient is not nervous/anxious.          OBJECTIVE:   /80   Pulse 56   Temp 97  F (36.1  C) (Temporal)   Resp 16   Ht 1.61 m (5' 3.39\")   Wt 80.5 kg (177 lb 8 oz)   SpO2 98%   BMI 31.06 kg/m   Estimated body mass index is 31.06 kg/m  as " "calculated from the following:    Height as of this encounter: 1.61 m (5' 3.39\").    Weight as of this encounter: 80.5 kg (177 lb 8 oz).  Physical Exam  GENERAL: healthy, alert and no distress  EYES: Eyes grossly normal to inspection, PERRL and conjunctivae and sclerae normal  HENT: ear canals and TM's normal, nose and mouth without ulcers or lesions  NECK: no adenopathy, no asymmetry, masses, or scars and thyroid normal to palpation  RESP: lungs clear to auscultation - no rales, rhonchi or wheezes  CV: regular rate and rhythm, normal S1 S2, no S3 or S4, no murmur, click or rub, no peripheral edema and peripheral pulses strong  ABDOMEN: soft, nontender, no hepatosplenomegaly, no masses and bowel sounds normal  MS: no gross musculoskeletal defects noted, no edema  SKIN: no suspicious lesions or rashes  NEURO: Normal strength and tone, mentation intact and speech normal  PSYCH: mentation appears normal, affect normal/bright    Diagnostic Test Results:  Labs reviewed in Epic  No results found for any visits on 11/17/21.    ASSESSMENT / PLAN:       ICD-10-CM    1. Encounter for Medicare annual wellness exam  Z00.00    2. Strain of left groin  S76.212A    3. Benign essential hypertension with target blood pressure below 140/90  I10    4. Hyperlipidemia LDL goal <130  E78.5 atorvastatin (LIPITOR) 20 MG tablet   5. Erectile dysfunction due to diseases classified elsewhere  N52.1 sildenafil (VIAGRA) 100 MG tablet     1. Reviewed recommended screenings and laboratory work done earlier this week for pt.   2. Suspected strain of left groin, discussed with pt to try ibuprofen 400mg 3-4 times/day for 1-2 weeks and follow up with clinic if his symptoms are not improving.   3.  Borderline control.  Discussed BP with patient and if half of his Bps are over 140/90s the he should follow up with clinic and we could discuss starting medications at that time.   4. Currently controlled, continue medication regimen of atorvastatin 20mg PO " "daily.  5. Dicussed with pt and his preference to switch to sildenafil 100mg  PO tablet for erectile dysfunction. Dicussed risks/benefits of medications.     Portions of this note were completed using Dragon dictation software.  Although reviewed, there may be typographical and other inadvertent errors that remain.     Physician Attestation   I, Arvind Mack MD, was present with the medical/MERE student who participated in the service and in the documentation of the note.  I have verified the history and personally performed the physical exam and medical decision making.  I agree with the assessment and plan of care as documented in the note.      I personally reviewed vital signs, medications, labs and imaging.      Arvind Mack MD, MD  Date of Service (when I saw the patient): 11/17/21   Patient has been advised of split billing requirements and indicates understanding: Yes  COUNSELING:  Reviewed preventive health counseling, as reflected in patient instructions       Regular exercise       Healthy diet/nutrition       Hearing screening       Dental care       Fall risk prevention    Estimated body mass index is 31.06 kg/m  as calculated from the following:    Height as of this encounter: 1.61 m (5' 3.39\").    Weight as of this encounter: 80.5 kg (177 lb 8 oz).    Weight management plan: Discussed healthy diet and exercise guidelines    He reports that he has quit smoking. His smoking use included cigarettes. He has never used smokeless tobacco.      Appropriate preventive services were discussed with this patient, including applicable screening as appropriate for cardiovascular disease, diabetes, osteopenia/osteoporosis, and glaucoma.  As appropriate for age/gender, discussed screening for colorectal cancer, prostate cancer, breast cancer, and cervical cancer. Checklist reviewing preventive services available has been given to the patient.    Reviewed patients plan of care and provided an AVS. " The Intermediate Care Plan ( asthma action plan, low back pain action plan, and migraine action plan) for Kameron meets the Care Plan requirement. This Care Plan has been established and reviewed with the Patient.    Counseling Resources:  ATP IV Guidelines  Pooled Cohorts Equation Calculator  Breast Cancer Risk Calculator  Breast Cancer: Medication to Reduce Risk  FRAX Risk Assessment  ICSI Preventive Guidelines  Dietary Guidelines for Americans, 2010  [employer]'s MyPlate  ASA Prophylaxis  Lung CA Screening    Arvind Mack MD, MD  [facility]    Identified Health Risks:

## 2021-11-17 ENCOUNTER — OFFICE VISIT (OUTPATIENT)
Dept: FAMILY MEDICINE | Facility: OTHER | Age: 71
End: 2021-11-17
Payer: COMMERCIAL

## 2021-11-17 VITALS
BODY MASS INDEX: 31.45 KG/M2 | SYSTOLIC BLOOD PRESSURE: 132 MMHG | WEIGHT: 177.5 LBS | HEART RATE: 56 BPM | DIASTOLIC BLOOD PRESSURE: 80 MMHG | RESPIRATION RATE: 16 BRPM | OXYGEN SATURATION: 98 % | HEIGHT: 63 IN | TEMPERATURE: 97 F

## 2021-11-17 DIAGNOSIS — S76.212A STRAIN OF LEFT GROIN: ICD-10-CM

## 2021-11-17 DIAGNOSIS — I10 BENIGN ESSENTIAL HYPERTENSION WITH TARGET BLOOD PRESSURE BELOW 140/90: ICD-10-CM

## 2021-11-17 DIAGNOSIS — E78.5 HYPERLIPIDEMIA LDL GOAL <130: ICD-10-CM

## 2021-11-17 DIAGNOSIS — N52.1 ERECTILE DYSFUNCTION DUE TO DISEASES CLASSIFIED ELSEWHERE: ICD-10-CM

## 2021-11-17 DIAGNOSIS — Z00.00 ENCOUNTER FOR MEDICARE ANNUAL WELLNESS EXAM: Primary | ICD-10-CM

## 2021-11-17 PROCEDURE — 99397 PER PM REEVAL EST PAT 65+ YR: CPT | Performed by: FAMILY MEDICINE

## 2021-11-17 PROCEDURE — 99214 OFFICE O/P EST MOD 30 MIN: CPT | Mod: 25 | Performed by: FAMILY MEDICINE

## 2021-11-17 RX ORDER — SILDENAFIL 100 MG/1
100 TABLET, FILM COATED ORAL DAILY PRN
Qty: 30 TABLET | Refills: 1 | Status: SHIPPED | OUTPATIENT
Start: 2021-11-17 | End: 2022-11-17

## 2021-11-17 RX ORDER — ATORVASTATIN CALCIUM 20 MG/1
20 TABLET, FILM COATED ORAL DAILY
Qty: 90 TABLET | Refills: 3 | Status: SHIPPED | OUTPATIENT
Start: 2021-11-17 | End: 2022-11-10

## 2021-11-17 ASSESSMENT — ENCOUNTER SYMPTOMS
HEARTBURN: 0
FEVER: 0
DYSURIA: 0
PARESTHESIAS: 0
JOINT SWELLING: 0
DIARRHEA: 0
FREQUENCY: 0
PALPITATIONS: 0
SORE THROAT: 0
DIZZINESS: 0
SHORTNESS OF BREATH: 0
HEMATOCHEZIA: 0
CONSTIPATION: 0
CHILLS: 0
HEMATURIA: 0
ABDOMINAL PAIN: 0
WEAKNESS: 0
EYE PAIN: 0
ARTHRALGIAS: 1
NAUSEA: 0
COUGH: 0
HEADACHES: 0
NERVOUS/ANXIOUS: 0
MYALGIAS: 0

## 2021-11-17 ASSESSMENT — ACTIVITIES OF DAILY LIVING (ADL): CURRENT_FUNCTION: NO ASSISTANCE NEEDED

## 2021-11-17 ASSESSMENT — MIFFLIN-ST. JEOR: SCORE: 1451.38

## 2021-11-17 NOTE — PATIENT INSTRUCTIONS
"For your groin pain, try ibuprofen 400 mg 3-4 times/day for 1-2 weeks.  Let me know if problems or not improving.    Let's try an otc cream for \"jock itch\".  One example would be clotrimazole twice daily for 2-4 weeks.  If that doesn't improve the smell, then we should take another look at things.    Work on weight loss, exercise, cutting salt in your diet to help with blood pressure.    If half your BPs are over 140/90, then we should start medication.    Contact us or return if questions or concerns.     Have a nice day!    Dr. Mack   Patient Education   Personalized Prevention Plan  You are due for the preventive services outlined below.  Your care team is available to assist you in scheduling these services.  If you have already completed any of these items, please share that information with your care team to update in your medical record.  Health Maintenance Due   Topic Date Due     ANNUAL REVIEW OF HM ORDERS  Never done     LUNG CANCER SCREENING  Never done     Zoster (Shingles) Vaccine (2 of 3) 08/17/2017     FALL RISK ASSESSMENT  11/13/2021       Understanding USDA MyPlate  The USDA has guidelines to help you make healthy food choices. These are called MyPlate. MyPlate shows the food groups that make up healthy meals using the image of a place setting. Before you eat, think about the healthiest choices for what to put on your plate or in your cup or bowl. To learn more about building a healthy plate, visit www.choosemyplate.gov.    The food groups    Fruits. Any fruit or 100% fruit juice counts as part of the Fruit Group. Fruits may be fresh, canned, frozen, or dried, and may be whole, cut-up, or pureed. Make 1/2 of your plate fruits and vegetables.    Vegetables. Any vegetable or 100% vegetable juice counts as a member of the Vegetable Group. Vegetables may be fresh, frozen, canned, or dried. They can be served raw or cooked and may be whole, cut-up, or mashed. Make 1/2 of your plate fruits and " vegetables.    Grains. All foods made from grains are part of the Grains Group. These include wheat, rice, oats, cornmeal, and barley. Grains are often used to make foods such as bread, pasta, oatmeal, cereal, tortillas, and grits. Grains should be no more than 1/4 of your plate. At least half of your grains should be whole grains.    Protein. This group includes meat, poultry, seafood, beans and peas, eggs, processed soy products (such as tofu), nuts (including nut butters), and seeds. Make protein choices no more than 1/4 of your plate. Meat and poultry choices should be lean or low fat.    Dairy. The Dairy Group includes all fluid milk products and foods made from milk that contain calcium, such as yogurt and cheese. (Foods that have little calcium, such as cream, butter, and cream cheese, are not part of this group.) Most dairy choices should be low-fat or fat-free.    Oils. Oils aren't a food group, but they do contain essential nutrients. However it's important to watch your intake of oils. These are fats that are liquid at room temperature. They include canola, corn, olive, soybean, vegetable, and sunflower oil. Foods that are mainly oil include mayonnaise, certain salad dressings, and soft margarines. You likely already get your daily oil allowance from the foods you eat.  Things to limit  Eating healthy also means limiting these things in your diet:       Salt (sodium). Many processed foods have a lot of sodium. To keep sodium intake down, eat fresh vegetables, meats, poultry, and seafood when possible. Purchase low-sodium, reduced-sodium, or no-salt-added food products at the store. And don't add salt to your meals at home. Instead, season them with herbs and spices such as dill, oregano, cumin, and paprika. Or try adding flavor with lemon or lime zest and juice.    Saturated fat. Saturated fats are most often found in animal products such as beef, pork, and chicken. They are often solid at room  temperature, such as butter. To reduce your saturated fat intake, choose leaner cuts of meat and poultry. And try healthier cooking methods such as grilling, broiling, roasting, or baking. For a simple lower-fat swap, use plain nonfat yogurt instead of mayonnaise when making potato salad or macaroni salad.    Added sugars. These are sugars added to foods. They are in foods such as ice cream, candy, soda, fruit drinks, sports drinks, energy drinks, cookies, pastries, jams, and syrups. Cut down on added sugars by sharing sweet treats with a family member or friend. You can also choose fruit for dessert, and drink water or other unsweetened beverages.     StayWell last reviewed this educational content on 6/1/2020 2000-2021 [employer]. All rights reserved. This information is not intended as a substitute for professional medical care. Always follow your healthcare professional's instructions.          Signs of Hearing Loss      Hearing much better with one ear can be a sign of hearing loss.   Hearing loss is a problem shared by many people. In fact, it is one of the most common health problems, particularly as people age. Most people age 65 and older have some hearing loss. By age 80, almost everyone does. Hearing loss often occurs slowly over the years. So you may not realize your hearing has gotten worse.  Have your hearing checked  Call your healthcare provider if you:    Have to strain to hear normal conversation    Have to watch other people s faces very carefully to follow what they re saying    Need to ask people to repeat what they ve said    Often misunderstand what people are saying    Turn the volume of the television or radio up so high that others complain    Feel that people are mumbling when they re talking to you    Find that the effort to hear leaves you feeling tired and irritated    Notice, when using the phone, that you hear better with one ear than the other  StayWell last  reviewed this educational content on 1/1/2020 2000-2021 [employer]. All rights reserved. This information is not intended as a substitute for professional medical care. Always follow your healthcare professional's instructions.

## 2022-05-29 ENCOUNTER — MYC MEDICAL ADVICE (OUTPATIENT)
Dept: FAMILY MEDICINE | Facility: OTHER | Age: 72
End: 2022-05-29
Payer: COMMERCIAL

## 2022-09-03 ENCOUNTER — HEALTH MAINTENANCE LETTER (OUTPATIENT)
Age: 72
End: 2022-09-03

## 2022-10-06 ENCOUNTER — IMMUNIZATION (OUTPATIENT)
Dept: FAMILY MEDICINE | Facility: CLINIC | Age: 72
End: 2022-10-06
Payer: COMMERCIAL

## 2022-10-06 PROCEDURE — G0008 ADMIN INFLUENZA VIRUS VAC: HCPCS

## 2022-10-06 PROCEDURE — 0134A COVID-19,PF,MODERNA BIVALENT: CPT

## 2022-10-06 PROCEDURE — 90662 IIV NO PRSV INCREASED AG IM: CPT

## 2022-10-06 PROCEDURE — 91313 COVID-19,PF,MODERNA BIVALENT: CPT

## 2022-11-04 ENCOUNTER — DOCUMENTATION ONLY (OUTPATIENT)
Dept: LAB | Facility: CLINIC | Age: 72
End: 2022-11-04

## 2022-11-04 DIAGNOSIS — E78.5 HYPERLIPIDEMIA LDL GOAL <130: Primary | ICD-10-CM

## 2022-11-04 DIAGNOSIS — I10 BENIGN ESSENTIAL HYPERTENSION WITH TARGET BLOOD PRESSURE BELOW 140/90: ICD-10-CM

## 2022-11-04 NOTE — PROGRESS NOTES
Kameron Whitley has an upcoming lab appointment:    Future Appointments   Date Time Provider Department Center   [facility]10[facility]2022  [facility]   11/17/2022 10:00 AM Nicola Andrews PA-C [facility]     Patient is scheduled for the following lab(s): pre visit labs    There is no order available. Please review and place either future orders or HMPO (Review of Health Maintenance Protocol Orders), as appropriate.    There are no preventive care reminders to display for this patient.  Narcisa Galindo

## 2022-11-04 NOTE — PROGRESS NOTES
It would appear that this patient's next visit with me on 10 November is too early for a Medicare annual wellness visit.  He may want to reconsider and schedule for later.  Electronically signed:    Nicola Banerjee PA-C

## 2022-11-10 ENCOUNTER — LAB (OUTPATIENT)
Dept: LAB | Facility: CLINIC | Age: 72
End: 2022-11-10
Payer: COMMERCIAL

## 2022-11-10 ENCOUNTER — MYC MEDICAL ADVICE (OUTPATIENT)
Dept: FAMILY MEDICINE | Facility: OTHER | Age: 72
End: 2022-11-10

## 2022-11-10 DIAGNOSIS — I10 BENIGN ESSENTIAL HYPERTENSION WITH TARGET BLOOD PRESSURE BELOW 140/90: ICD-10-CM

## 2022-11-10 DIAGNOSIS — E78.5 HYPERLIPIDEMIA LDL GOAL <130: ICD-10-CM

## 2022-11-10 LAB
ALBUMIN SERPL-MCNC: 3.7 G/DL (ref 3.4–5)
ALP SERPL-CCNC: 79 U/L (ref 40–150)
ALT SERPL W P-5'-P-CCNC: 44 U/L (ref 0–70)
ANION GAP SERPL CALCULATED.3IONS-SCNC: 2 MMOL/L (ref 3–14)
AST SERPL W P-5'-P-CCNC: 28 U/L (ref 0–45)
BILIRUB SERPL-MCNC: 0.9 MG/DL (ref 0.2–1.3)
BUN SERPL-MCNC: 18 MG/DL (ref 7–30)
CALCIUM SERPL-MCNC: 8.6 MG/DL (ref 8.5–10.1)
CHLORIDE BLD-SCNC: 108 MMOL/L (ref 94–109)
CHOLEST SERPL-MCNC: 110 MG/DL
CO2 SERPL-SCNC: 31 MMOL/L (ref 20–32)
CREAT SERPL-MCNC: 1.02 MG/DL (ref 0.66–1.25)
FASTING STATUS PATIENT QL REPORTED: YES
GFR SERPL CREATININE-BSD FRML MDRD: 77 ML/MIN/1.73M2
GLUCOSE BLD-MCNC: 112 MG/DL (ref 70–99)
HDLC SERPL-MCNC: 67 MG/DL
HGB BLD-MCNC: 15.9 G/DL (ref 13.3–17.7)
LDLC SERPL CALC-MCNC: 25 MG/DL
NONHDLC SERPL-MCNC: 43 MG/DL
POTASSIUM BLD-SCNC: 4.2 MMOL/L (ref 3.4–5.3)
PROT SERPL-MCNC: 7.2 G/DL (ref 6.8–8.8)
SODIUM SERPL-SCNC: 141 MMOL/L (ref 133–144)
TRIGL SERPL-MCNC: 89 MG/DL

## 2022-11-10 PROCEDURE — 36415 COLL VENOUS BLD VENIPUNCTURE: CPT

## 2022-11-10 PROCEDURE — 85018 HEMOGLOBIN: CPT

## 2022-11-10 PROCEDURE — 80053 COMPREHEN METABOLIC PANEL: CPT

## 2022-11-10 PROCEDURE — 80061 LIPID PANEL: CPT

## 2022-11-10 RX ORDER — ATORVASTATIN CALCIUM 20 MG/1
20 TABLET, FILM COATED ORAL DAILY
Qty: 90 TABLET | Refills: 3 | Status: SHIPPED | OUTPATIENT
Start: 2022-11-10 | End: 2022-11-17

## 2022-11-10 NOTE — TELEPHONE ENCOUNTER
Pt calling to get a few days of atorvastatin to get him to his annual wellness visit 11/17. He said he has 3 left and would need around 5 to get to appt.

## 2022-11-15 ASSESSMENT — ENCOUNTER SYMPTOMS
WEAKNESS: 0
HEMATURIA: 0
DIARRHEA: 0
ABDOMINAL PAIN: 0
HEMATOCHEZIA: 0
PALPITATIONS: 0
SORE THROAT: 0
NAUSEA: 0
SHORTNESS OF BREATH: 0
DYSURIA: 0
CONSTIPATION: 0
COUGH: 0
ARTHRALGIAS: 1
PARESTHESIAS: 0
FREQUENCY: 0
MYALGIAS: 0
JOINT SWELLING: 0
CHILLS: 0
FEVER: 0
HEADACHES: 0
DIZZINESS: 0
NERVOUS/ANXIOUS: 0
EYE PAIN: 0
HEARTBURN: 0

## 2022-11-15 ASSESSMENT — ACTIVITIES OF DAILY LIVING (ADL): CURRENT_FUNCTION: NO ASSISTANCE NEEDED

## 2022-11-17 ENCOUNTER — OFFICE VISIT (OUTPATIENT)
Dept: FAMILY MEDICINE | Facility: OTHER | Age: 72
End: 2022-11-17
Payer: COMMERCIAL

## 2022-11-17 VITALS
DIASTOLIC BLOOD PRESSURE: 74 MMHG | WEIGHT: 174 LBS | BODY MASS INDEX: 30.83 KG/M2 | TEMPERATURE: 96.9 F | OXYGEN SATURATION: 97 % | SYSTOLIC BLOOD PRESSURE: 132 MMHG | RESPIRATION RATE: 20 BRPM | HEIGHT: 63 IN | HEART RATE: 64 BPM

## 2022-11-17 DIAGNOSIS — Z00.00 ROUTINE HISTORY AND PHYSICAL EXAMINATION OF ADULT: ICD-10-CM

## 2022-11-17 DIAGNOSIS — R00.1 SINUS BRADYCARDIA: ICD-10-CM

## 2022-11-17 DIAGNOSIS — Z00.00 ENCOUNTER FOR MEDICARE ANNUAL WELLNESS EXAM: ICD-10-CM

## 2022-11-17 DIAGNOSIS — N52.1 ERECTILE DYSFUNCTION DUE TO DISEASES CLASSIFIED ELSEWHERE: ICD-10-CM

## 2022-11-17 DIAGNOSIS — E78.5 HYPERLIPIDEMIA LDL GOAL <130: Primary | ICD-10-CM

## 2022-11-17 DIAGNOSIS — I10 BENIGN ESSENTIAL HYPERTENSION WITH TARGET BLOOD PRESSURE BELOW 140/90: ICD-10-CM

## 2022-11-17 PROBLEM — R03.0 ELEVATED BLOOD PRESSURE READING WITHOUT DIAGNOSIS OF HYPERTENSION: Status: RESOLVED | Noted: 2017-11-09 | Resolved: 2022-11-17

## 2022-11-17 PROCEDURE — G0439 PPPS, SUBSEQ VISIT: HCPCS | Performed by: PHYSICIAN ASSISTANT

## 2022-11-17 PROCEDURE — 99213 OFFICE O/P EST LOW 20 MIN: CPT | Mod: 25 | Performed by: PHYSICIAN ASSISTANT

## 2022-11-17 RX ORDER — SILDENAFIL 100 MG/1
100 TABLET, FILM COATED ORAL DAILY PRN
Qty: 30 TABLET | Refills: 1 | Status: SHIPPED | OUTPATIENT
Start: 2022-11-17

## 2022-11-17 RX ORDER — ATORVASTATIN CALCIUM 20 MG/1
10 TABLET, FILM COATED ORAL DAILY
Qty: 1 TABLET | Refills: 0 | COMMUNITY
Start: 2022-11-17 | End: 2023-04-06

## 2022-11-17 ASSESSMENT — ACTIVITIES OF DAILY LIVING (ADL): CURRENT_FUNCTION: NO ASSISTANCE NEEDED

## 2022-11-17 ASSESSMENT — ENCOUNTER SYMPTOMS
DYSURIA: 0
FREQUENCY: 0
NAUSEA: 0
CHILLS: 0
COUGH: 0
SHORTNESS OF BREATH: 0
WEAKNESS: 0
DIZZINESS: 0
HEMATURIA: 0
FEVER: 0
NERVOUS/ANXIOUS: 0
EYE PAIN: 0
CONSTIPATION: 0
DIARRHEA: 0
PALPITATIONS: 0
SORE THROAT: 0
HEMATOCHEZIA: 0
HEADACHES: 0
MYALGIAS: 0
ABDOMINAL PAIN: 0
JOINT SWELLING: 0
HEARTBURN: 0
ARTHRALGIAS: 1
PARESTHESIAS: 0

## 2022-11-17 ASSESSMENT — PAIN SCALES - GENERAL: PAINLEVEL: NO PAIN (0)

## 2022-11-17 NOTE — PATIENT INSTRUCTIONS
Patient Education   Personalized Prevention Plan  You are due for the preventive services outlined below.  Your care team is available to assist you in scheduling these services.  If you have already completed any of these items, please share that information with your care team to update in your medical record.  Health Maintenance Due   Topic Date Due     Zoster (Shingles) Vaccine (2 of 3) 08/17/2017       Understanding USDA MyPlate  The USDA has guidelines to help you make healthy food choices. These are called MyPlate. MyPlate shows the food groups that make up healthy meals using the image of a place setting. Before you eat, think about the healthiest choices for what to put on your plate or in your cup or bowl. To learn more about building a healthy plate, visit www.choosemyplate.gov.    The food groups    Fruits. Any fruit or 100% fruit juice counts as part of the Fruit Group. Fruits may be fresh, canned, frozen, or dried, and may be whole, cut-up, or pureed. Make 1/2 of your plate fruits and vegetables.    Vegetables. Any vegetable or 100% vegetable juice counts as a member of the Vegetable Group. Vegetables may be fresh, frozen, canned, or dried. They can be served raw or cooked and may be whole, cut-up, or mashed. Make 1/2 of your plate fruits and vegetables.    Grains. All foods made from grains are part of the Grains Group. These include wheat, rice, oats, cornmeal, and barley. Grains are often used to make foods such as bread, pasta, oatmeal, cereal, tortillas, and grits. Grains should be no more than 1/4 of your plate. At least half of your grains should be whole grains.    Protein. This group includes meat, poultry, seafood, beans and peas, eggs, processed soy products (such as tofu), nuts (including nut butters), and seeds. Make protein choices no more than 1/4 of your plate. Meat and poultry choices should be lean or low fat.    Dairy. The Dairy Group includes all fluid milk products and foods made  from milk that contain calcium, such as yogurt and cheese. (Foods that have little calcium, such as cream, butter, and cream cheese, are not part of this group.) Most dairy choices should be low-fat or fat-free.    Oils. Oils aren't a food group, but they do contain essential nutrients. However it's important to watch your intake of oils. These are fats that are liquid at room temperature. They include canola, corn, olive, soybean, vegetable, and sunflower oil. Foods that are mainly oil include mayonnaise, certain salad dressings, and soft margarines. You likely already get your daily oil allowance from the foods you eat.  Things to limit  Eating healthy also means limiting these things in your diet:       Salt (sodium). Many processed foods have a lot of sodium. To keep sodium intake down, eat fresh vegetables, meats, poultry, and seafood when possible. Purchase low-sodium, reduced-sodium, or no-salt-added food products at the store. And don't add salt to your meals at home. Instead, season them with herbs and spices such as dill, oregano, cumin, and paprika. Or try adding flavor with lemon or lime zest and juice.    Saturated fat. Saturated fats are most often found in animal products such as beef, pork, and chicken. They are often solid at room temperature, such as butter. To reduce your saturated fat intake, choose leaner cuts of meat and poultry. And try healthier cooking methods such as grilling, broiling, roasting, or baking. For a simple lower-fat swap, use plain nonfat yogurt instead of mayonnaise when making potato salad or macaroni salad.    Added sugars. These are sugars added to foods. They are in foods such as ice cream, candy, soda, fruit drinks, sports drinks, energy drinks, cookies, pastries, jams, and syrups. Cut down on added sugars by sharing sweet treats with a family member or friend. You can also choose fruit for dessert, and drink water or other unsweetened beverages.     StayWell last  reviewed this educational content on 6/1/2020 2000-2021 [employer]. All rights reserved. This information is not intended as a substitute for professional medical care. Always follow your healthcare professional's instructions.          Signs of Hearing Loss      Hearing much better with one ear can be a sign of hearing loss.   Hearing loss is a problem shared by many people. In fact, it is one of the most common health problems, particularly as people age. Most people age 65 and older have some hearing loss. By age 80, almost everyone does. Hearing loss often occurs slowly over the years. So you may not realize your hearing has gotten worse.  Have your hearing checked  Call your healthcare provider if you:    Have to strain to hear normal conversation    Have to watch other people s faces very carefully to follow what they re saying    Need to ask people to repeat what they ve said    Often misunderstand what people are saying    Turn the volume of the television or radio up so high that others complain    Feel that people are mumbling when they re talking to you    Find that the effort to hear leaves you feeling tired and irritated    Notice, when using the phone, that you hear better with one ear than the other  StayWell last reviewed this educational content on 1/1/2020 2000-2021 [employer]. All rights reserved. This information is not intended as a substitute for professional medical care. Always follow your healthcare professional's instructions.

## 2022-11-17 NOTE — PROGRESS NOTES
"SUBJECTIVE:   Kameron is a 72 year old who presents for Preventive Visit.      Patient has been advised of split billing requirements and indicates understanding: Yes     Are you in the first 12 months of your Medicare coverage?  No    Healthy Habits:     In general, how would you rate your overall health?  Good    Frequency of exercise:  4-5 days/week    Duration of exercise:  30-45 minutes    Do you usually eat at least 4 servings of fruit and vegetables a day, include whole grains    & fiber and avoid regularly eating high fat or \"junk\" foods?  No    Taking medications regularly:  Yes    Medication side effects:  None    Ability to successfully perform activities of daily living:  No assistance needed    Home Safety:  No safety concerns identified    Hearing Impairment:  Difficulty following a conversation in a noisy restaurant or crowded room, feel that people are mumbling or not speaking clearly and difficulty understanding soft or whispered speech    In the past 6 months, have you been bothered by leaking of urine?  No    In general, how would you rate your overall mental or emotional health?  Excellent      PHQ-2 Total Score: 0    Additional concerns today:  No      Have you ever done Advance Care Planning? (For example, a Health Directive, POLST, or a discussion with a medical provider or your loved ones about your wishes): No, advance care planning information given to patient to review.  Patient plans to discuss their wishes with loved ones or provider.         Fall risk  Fallen 2 or more times in the past year?: No  Any fall with injury in the past year?: No    Cognitive Screening   1) Repeat 3 items (Leader, Season, Table)    2) Clock draw: NORMAL  3) 3 item recall: Recalls 3 objects  Results: 3 items recalled: COGNITIVE IMPAIRMENT LESS LIKELY    Mini-CogTM Copyright S Miko. Licensed by the author for use in [facility]; reprinted with permission (isrrael@.[email or web address]). All rights reserved.  "     Reviewed and updated as needed this visit by clinical staff   Tobacco  Allergies  Meds              Reviewed and updated as needed this visit by Provider                 Social History     Tobacco Use     Smoking status: Former     Packs/day: 0.50     Years: 10.00     Pack years: 5.00     Types: Cigarettes     Start date: 1969     Quit date: 3/15/1978     Years since quittin.7     Smokeless tobacco: Never     Tobacco comments:     quit in his 30s   Substance Use Topics     Alcohol use: Yes     Alcohol/week: 1.0 standard drink     Types: 1 Cans of beer per week     Comment: occ         Alcohol Use 11/15/2022   Prescreen: >3 drinks/day or >7 drinks/week? No   Prescreen: >3 drinks/day or >7 drinks/week? -         Current providers sharing in care for this patient include:   Patient Care Team:  Nicola Andrews PA-C as PCP - General (Physician Assistant)  Arvind Mack MD as Assigned PCP    The following health maintenance items are reviewed in Epic and correct as of today:  Health Maintenance   Topic Date Due     ZOSTER IMMUNIZATION (2 of 3) 2017     MEDICARE ANNUAL WELLNESS VISIT  2022     ANNUAL REVIEW OF HM ORDERS  2022     FALL RISK ASSESSMENT  2023     COLORECTAL CANCER SCREENING  2024     DTAP/TDAP/TD IMMUNIZATION (3 - Td or Tdap) 2025     ADVANCE CARE PLANNING  2026     LIPID  11/10/2027     HEPATITIS C SCREENING  Completed     PHQ-2 (once per calendar year)  Completed     INFLUENZA VACCINE  Completed     Pneumococcal Vaccine: 65+ Years  Completed     AORTIC ANEURYSM SCREENING (SYSTEM ASSIGNED)  Completed     COVID-19 Vaccine  Completed     IPV IMMUNIZATION  Aged Out     MENINGITIS IMMUNIZATION  Aged Out     LUNG CANCER SCREENING  Discontinued     Lab work is in process  Labs reviewed in EPIC  BP Readings from Last 3 Encounters:   22 132/74   21 132/80   20 120/78    Wt Readings from Last 3 Encounters:   22 78.9 kg (174 lb)    21 80.5 kg (177 lb 8 oz)   20 81.2 kg (179 lb)                  Patient Active Problem List   Diagnosis     Impacted cerumen     Groin mass     ED (erectile dysfunction)     Erectile dysfunction due to diseases classified elsewhere     Cholelithiasis with acute cholecystitis without biliary obstruction     Hyperlipidemia LDL goal <130     Disturbance of skin sensation     [place]-neck deformity of finger     Sinus bradycardia     Benign essential hypertension with target blood pressure below 140/90     Past Surgical History:   Procedure Laterality Date     ARTHRODESIS FINGER(S) Right 2019    Procedure: Right index finger fusion;  Surgeon: EMILIANA Aguilar MD;  Location: [facility] OR     CARPAL TUNNEL RELEASE RT/LT  2006    right hand     COLONOSCOPY  2009    Normal-f/u 10 years (At [facility])     COLONOSCOPY N/A 2019    Procedure: COMBINED COLONOSCOPY, SINGLE POLYPECTOMY BY BIOPSY;  Surgeon: Brendan Nunes MD;  Location: [facility]     LAPAROSCOPIC CHOLECYSTECTOMY N/A 10/5/2016    Procedure: LAPAROSCOPIC CHOLECYSTECTOMY;  Surgeon: Nikita Anderson MD;  Location: [facility] OR     LAPAROSCOPIC HERNIORRHAPHY INGUINAL BILATERAL  1/10/2014    Procedure: LAPAROSCOPIC HERNIORRHAPHY INGUINAL BILATERAL;  Laparoscopic Bilateral Inguinal Hernia Repair;  Surgeon: Nikita Anderson MD;  Location: [facility] OR       Social History     Tobacco Use     Smoking status: Former     Packs/day: 0.50     Years: 10.00     Pack years: 5.00     Types: Cigarettes     Start date: 1969     Quit date: 3/15/1978     Years since quittin.7     Smokeless tobacco: Never     Tobacco comments:     quit in his 30s   Substance Use Topics     Alcohol use: Yes     Alcohol/week: 1.0 standard drink     Types: 1 Cans of beer per week     Comment: occ     Family History   Problem Relation Age of Onset     Diabetes Mother      Arthritis Mother      Diabetes Brother      Diabetes Brother          Current Outpatient Medications   Medication  "Sig Dispense Refill     atorvastatin (LIPITOR) 20 MG tablet Take 0.5 tablets (10 mg) by mouth daily 1 tablet 0     sildenafil (VIAGRA) 100 MG tablet Take 1 tablet (100 mg) by mouth daily as needed (erectile dysfunction) 30 tablet 1     No Known Allergies  Recent Labs   Lab Test 11/10/22  0827 11/12/21  0758 11/06/20  1045 11/11/19  0804   LDL 25 25 25 49   HDL 67 71 80 77   TRIG 89 50 84 78   ALT 44 32 30 30   CR 1.02 0.95 0.96 1.16   GFRESTIMATED 77 79 79 63   GFRESTBLACK  --   --  >90 73   POTASSIUM 4.2 4.3 4.0 4.0   TSH  --   --  1.76  --          Review of Systems   Constitutional: Negative for chills and fever.   HENT: Positive for hearing loss. Negative for congestion, ear pain and sore throat.    Eyes: Negative for pain and visual disturbance.   Respiratory: Negative for cough and shortness of breath.    Cardiovascular: Negative for chest pain, palpitations and peripheral edema.   Gastrointestinal: Negative for abdominal pain, constipation, diarrhea, heartburn, hematochezia and nausea.   Genitourinary: Positive for impotence. Negative for dysuria, frequency, genital sores, hematuria, penile discharge and urgency.   Musculoskeletal: Positive for arthralgias. Negative for joint swelling and myalgias.   Skin: Negative for rash.   Neurological: Negative for dizziness, weakness, headaches and paresthesias.   Psychiatric/Behavioral: Negative for mood changes. The patient is not nervous/anxious.        OBJECTIVE:   /74   Pulse 64   Temp 96.9  F (36.1  C) (Temporal)   Resp 20   Ht 1.61 m (5' 3.39\")   Wt 78.9 kg (174 lb)   SpO2 97%   BMI 30.45 kg/m   Estimated body mass index is 30.45 kg/m  as calculated from the following:    Height as of this encounter: 1.61 m (5' 3.39\").    Weight as of this encounter: 78.9 kg (174 lb).  Physical Exam  GENERAL: healthy, alert and no distress  EYES: Eyes grossly normal to inspection, PERRL and conjunctivae and sclerae normal  HENT: ear canals and TM's normal, nose and " mouth without ulcers or lesions  NECK: no adenopathy, no asymmetry, masses, or scars and thyroid normal to palpation  RESP: lungs clear to auscultation - no rales, rhonchi or wheezes  CV: regular rate and rhythm, normal S1 S2, no S3 or S4, no murmur, click or rub, no peripheral edema and peripheral pulses strong  ABDOMEN: soft, nontender, no hepatosplenomegaly, no masses and bowel sounds normal   (male): declined today  MS: no gross musculoskeletal defects noted, no edema  SKIN: no suspicious lesions or rashes  NEURO: Normal strength and tone, mentation intact and speech normal  PSYCH: mentation appears normal, affect normal/bright    Diagnostic Test Results:  Labs reviewed in Epic    ASSESSMENT / PLAN:   Kameron was seen today for wellness visit.    Diagnoses and all orders for this visit:    Hyperlipidemia LDL goal <130    Routine history and physical examination of adult    Sinus bradycardia    Benign essential hypertension with target blood pressure below 140/90    Erectile dysfunction due to diseases classified elsewhere  -     sildenafil (VIAGRA) 100 MG tablet; Take 1 tablet (100 mg) by mouth daily as needed (erectile dysfunction)    Encounter for Medicare annual wellness exam    Other orders  -     ZOSTER VACCINE RECOMBINANT ADJUVANTED (SHINGRIX); Future  -     REVIEW OF HEALTH MAINTENANCE PROTOCOL ORDERS    72-year-old male here for routine physical and Medicare wellness exam.  Repeat in 1 year.    LDL goal discussed.  He is well below the goal and we discussed backing off on his medications to half a tablet daily and following up in 6 months.    Historically noted sinus bradycardia with normal blood pressure today.  No new concerns with respect to this.  Encouraged him to continue monitoring this on a regular basis and follow-up based on results.    Continues to struggle with erectile dysfunction and would like refill of his medications.  This is granted.  Follow-up as needed.      Patient has been advised  "of split billing requirements and indicates understanding: Yes      COUNSELING:  Reviewed preventive health counseling, as reflected in patient instructions       Consider AAA screening for ages 65-75 and smoking history       Regular exercise       Healthy diet/nutrition       Vision screening       Hearing screening       Dental care       Bladder control       Fall risk prevention       Aspirin prophylaxis        Alcohol Use       BMI:   Estimated body mass index is 30.45 kg/m  as calculated from the following:    Height as of this encounter: 1.61 m (5' 3.39\").    Weight as of this encounter: 78.9 kg (174 lb).   Weight management plan: Discussed healthy diet and exercise guidelines      He reports that he quit smoking about 44 years ago. His smoking use included cigarettes. He started smoking about 53 years ago. He has a 5.00 pack-year smoking history. He has never used smokeless tobacco.      Appropriate preventive services were discussed with this patient, including applicable screening as appropriate for cardiovascular disease, diabetes, osteopenia/osteoporosis, and glaucoma.  As appropriate for age/gender, discussed screening for colorectal cancer, prostate cancer, breast cancer, and cervical cancer. Checklist reviewing preventive services available has been given to the patient.    Reviewed patients plan of care and provided an AVS. The Basic Care Plan (routine screening as documented in Health Maintenance) for Kameron meets the Care Plan requirement. This Care Plan has been established and reviewed with the Patient.    Nicola Banerjee PA-C  [facility]    Identified Health Risks:  "

## 2023-01-20 ENCOUNTER — OFFICE VISIT (OUTPATIENT)
Dept: FAMILY MEDICINE | Facility: OTHER | Age: 73
End: 2023-01-20
Payer: COMMERCIAL

## 2023-01-20 VITALS
OXYGEN SATURATION: 100 % | DIASTOLIC BLOOD PRESSURE: 78 MMHG | HEART RATE: 48 BPM | TEMPERATURE: 97.6 F | WEIGHT: 169 LBS | RESPIRATION RATE: 12 BRPM | HEIGHT: 63 IN | BODY MASS INDEX: 29.95 KG/M2 | SYSTOLIC BLOOD PRESSURE: 118 MMHG

## 2023-01-20 DIAGNOSIS — E78.5 HYPERLIPIDEMIA LDL GOAL <130: ICD-10-CM

## 2023-01-20 DIAGNOSIS — L98.9 SKIN LESION: Primary | ICD-10-CM

## 2023-01-20 DIAGNOSIS — R00.1 SINUS BRADYCARDIA: ICD-10-CM

## 2023-01-20 PROCEDURE — 99214 OFFICE O/P EST MOD 30 MIN: CPT | Mod: 25 | Performed by: PHYSICIAN ASSISTANT

## 2023-01-20 PROCEDURE — 11301 SHAVE SKIN LESION 0.6-1.0 CM: CPT | Performed by: PHYSICIAN ASSISTANT

## 2023-01-20 PROCEDURE — 88342 IMHCHEM/IMCYTCHM 1ST ANTB: CPT | Performed by: PATHOLOGY

## 2023-01-20 PROCEDURE — 88305 TISSUE EXAM BY PATHOLOGIST: CPT | Performed by: PATHOLOGY

## 2023-01-20 ASSESSMENT — PAIN SCALES - GENERAL: PAINLEVEL: NO PAIN (0)

## 2023-01-20 NOTE — PROGRESS NOTES
Assessment & Plan     Skin lesion - right anterior chest at the clavicle.  Patient presents to the clinic with a lesion to the right anterior chest just inferior to the clavicle at the midline that is slow to heal and he cannot recall injuring himself in this area.  It has a slight pearlescence.  And does give me concern for basal cell carcinoma.  It is removed this afternoon with a shave type biopsy.  - Surgical Pathology Exam    Sinus bradycardia  Asymptomatic at this point time.  Advised that he continue to monitor.  - Lipid panel reflex to direct LDL Fasting; Future  - Comprehensive metabolic panel (BMP + Alb, Alk Phos, ALT, AST, Total. Bili, TP); Future    Hyperlipidemia LDL goal <130  Due for recheck of labs in the near future.  No new concerns.  Follow-up..  - Lipid panel reflex to direct LDL Fasting; Future  - Comprehensive metabolic panel (BMP + Alb, Alk Phos, ALT, AST, Total. Bili, TP); Future     No follow-ups on file.    MATTI Pascual [facility] CARLEY Melo is a 72 year old, presenting for the following health issues:  Derm Problem      History of Present Illness       Reason for visit:  Have a spot on my skin that I would like looked at by medical team  Symptom onset:  More than a month  Symptoms include:  None  Symptom intensity:  Mild  Symptom progression:  Staying the same  Had these symptoms before:  No  What makes it worse:  No  What makes it better:  No    He eats 2-3 servings of fruits and vegetables daily.He consumes 0 sweetened beverage(s) daily.He exercises with enough effort to increase his heart rate 20 to 29 minutes per day.  He exercises with enough effort to increase his heart rate 5 days per week.   He is taking medications regularly.     Review of Systems   Constitutional, HEENT, cardiovascular, pulmonary, GI, , musculoskeletal, neuro, skin, endocrine and psych systems are negative, except as otherwise noted.      Objective    /78    "Pulse (!) 48   Temp 97.6  F (36.4  C) (Temporal)   Resp 12   Ht 1.61 m (5' 3.39\")   Wt 76.7 kg (169 lb)   SpO2 100%   BMI 29.57 kg/m    Body mass index is 29.57 kg/m .  Physical Exam   GENERAL: healthy, alert and no distress  NECK: no adenopathy, no asymmetry, masses, or scars and thyroid normal to palpation  RESP: lungs clear to auscultation - no rales, rhonchi or wheezes  CV: regular rate and rhythm, normal S1 S2, no S3 or S4, no murmur, click or rub, no peripheral edema and peripheral pulses strong  ABDOMEN: soft, nontender, no hepatosplenomegaly, no masses and bowel sounds normal  MS: no gross musculoskeletal defects noted, no edema  SKIN: no suspicious lesions or rashes to exposed visible skin with exception of to the right anterior chest at the inferior midline of the clavicle there is an area of erythema and pearlescence that he states is a wound that does not heal well.  He thinks he may have scratched this area but does not remember an initial insult or injury.  This in my opinion is reminiscent of basal cell carcinoma in its early stages.  NEURO: Normal strength and tone, mentation intact and speech normal  PSYCH: mentation appears normal, affect normal/bright    Procedure:  Informed consent is obtained and risk factors discussed to the patients satisfaction.  I answered questions the patient had.  The area of concern is/are cleansed with betadine 3x's.  The base of the lesion(s) is / are infiltrated with 0.5% bupivacaine local with good effect.  The lesion(s) are removed with sharp dissection and placed in pathology containers which have been labeled appropriately.  Cautery is used to obtain hemostasis.  Surgical sites are cleansed and dressed in the usual fashion.  Patient tolerated the procedure well.    No results found for this or any previous visit (from the past 24 hour(s)).    Labs pending at time of dictation.            "

## 2023-01-26 ENCOUNTER — LAB (OUTPATIENT)
Dept: LAB | Facility: CLINIC | Age: 73
End: 2023-01-26
Payer: COMMERCIAL

## 2023-01-26 DIAGNOSIS — R00.1 SINUS BRADYCARDIA: ICD-10-CM

## 2023-01-26 DIAGNOSIS — E78.5 HYPERLIPIDEMIA LDL GOAL <130: ICD-10-CM

## 2023-01-26 LAB
ALBUMIN SERPL BCG-MCNC: 4.3 G/DL (ref 3.5–5.2)
ALP SERPL-CCNC: 79 U/L (ref 40–129)
ALT SERPL W P-5'-P-CCNC: 27 U/L (ref 10–50)
ANION GAP SERPL CALCULATED.3IONS-SCNC: 10 MMOL/L (ref 7–15)
AST SERPL W P-5'-P-CCNC: 31 U/L (ref 10–50)
BILIRUB SERPL-MCNC: 0.9 MG/DL
BUN SERPL-MCNC: 16.3 MG/DL (ref 8–23)
CALCIUM SERPL-MCNC: 9.6 MG/DL (ref 8.8–10.2)
CHLORIDE SERPL-SCNC: 103 MMOL/L (ref 98–107)
CHOLEST SERPL-MCNC: 128 MG/DL
CREAT SERPL-MCNC: 1.09 MG/DL (ref 0.67–1.17)
DEPRECATED HCO3 PLAS-SCNC: 28 MMOL/L (ref 22–29)
GFR SERPL CREATININE-BSD FRML MDRD: 71 ML/MIN/1.73M2
GLUCOSE SERPL-MCNC: 108 MG/DL (ref 70–99)
HDLC SERPL-MCNC: 68 MG/DL
LDLC SERPL CALC-MCNC: 42 MG/DL
NONHDLC SERPL-MCNC: 60 MG/DL
PATH REPORT.COMMENTS IMP SPEC: NORMAL
PATH REPORT.COMMENTS IMP SPEC: NORMAL
PATH REPORT.FINAL DX SPEC: NORMAL
PATH REPORT.GROSS SPEC: NORMAL
PATH REPORT.MICROSCOPIC SPEC OTHER STN: NORMAL
PATH REPORT.RELEVANT HX SPEC: NORMAL
PHOTO IMAGE: NORMAL
POTASSIUM SERPL-SCNC: 4.1 MMOL/L (ref 3.4–5.3)
PROT SERPL-MCNC: 6.9 G/DL (ref 6.4–8.3)
SODIUM SERPL-SCNC: 141 MMOL/L (ref 136–145)
TRIGL SERPL-MCNC: 91 MG/DL

## 2023-01-26 PROCEDURE — 80061 LIPID PANEL: CPT

## 2023-01-26 PROCEDURE — 36415 COLL VENOUS BLD VENIPUNCTURE: CPT

## 2023-01-26 PROCEDURE — 80053 COMPREHEN METABOLIC PANEL: CPT

## 2023-01-26 NOTE — RESULT ENCOUNTER NOTE
Mr. Whitley    Your recent test results are attached.  I am resulting your labs as a provider is out of office.  Cholesterol levels are in the normal range.  Blood chemistries show normal kidney and liver functions.  Blood glucose levels are marginally elevated.  Low-carb diet and regular exercise to help improve this.    If you have any questions or concerns please contact me via My Chart or call the clinic at [phone]     Thank You  Ana M García MD.

## 2023-01-27 ENCOUNTER — TELEPHONE (OUTPATIENT)
Dept: FAMILY MEDICINE | Facility: OTHER | Age: 73
End: 2023-01-27
Payer: COMMERCIAL

## 2023-01-27 ENCOUNTER — TELEPHONE (OUTPATIENT)
Dept: DERMATOLOGY | Facility: CLINIC | Age: 73
End: 2023-01-27
Payer: COMMERCIAL

## 2023-01-27 DIAGNOSIS — C44.529 SCC (SQUAMOUS CELL CARCINOMA), TRUNK: Primary | ICD-10-CM

## 2023-01-27 NOTE — TELEPHONE ENCOUNTER
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 Pt previously had the spot removed, but is being referred because the referring provider wants to be sure that it was all removed, and to remove any additional cancer tissue. Pt will be going on vacation for 1 month starting on 2/8, would like to try to get a consult in prior to leaving, if possible. Please review and contact pt, thanks!     Action Taken: Message routed to: [facility]     Travel Screening: Not Applicable

## 2023-01-30 NOTE — TELEPHONE ENCOUNTER
Patient has a narrowly excised SCC, KA type on right upper chest.  Biopsy done by PCP on 1/20/23.  Please schedule for consult with Dr. Easton or Dr. Nicholson.    Sarita Che RN

## 2023-02-01 ENCOUNTER — OFFICE VISIT (OUTPATIENT)
Dept: DERMATOLOGY | Facility: CLINIC | Age: 73
End: 2023-02-01
Payer: COMMERCIAL

## 2023-02-01 DIAGNOSIS — L57.8 SUN-DAMAGED SKIN: ICD-10-CM

## 2023-02-01 DIAGNOSIS — C44.529 SQUAMOUS CELL CARCINOMA OF SKIN OF CHEST: Primary | ICD-10-CM

## 2023-02-01 DIAGNOSIS — D22.9 MULTIPLE BENIGN NEVI: ICD-10-CM

## 2023-02-01 DIAGNOSIS — R23.8 SCALP IRRITATION: ICD-10-CM

## 2023-02-01 DIAGNOSIS — L82.1 SEBORRHEIC KERATOSIS: ICD-10-CM

## 2023-02-01 PROCEDURE — 99203 OFFICE O/P NEW LOW 30 MIN: CPT | Performed by: DERMATOLOGY

## 2023-02-01 NOTE — PATIENT INSTRUCTIONS
For the dry spot on your scalp, start using a shampoo with salicylic acid as an ingredient. Some examples are: [employer] T plus; Neutrogena T/Sal                Dermatological Surgery Unit  Javier Urstam, DO      Pre-Surgical Instruction Sheet    Take all normal medications -- unless otherwise indicated by your physician.    If you have an artifical heart valve, or joint replacement within two years, take your antibiotic as prescribed. Otherwise, we will administer antibiotic in our office.    You will need a  to be with you if your surgical site is close to your eyes. You can get swelling/bruising immediately after surgery and will go home with a big bulky bandage that could obstruct your view of driving safely.    Wear comfortable clothing -- preferably a button down shirt or a loose fitted shirt. (to avoid pulling over pressure bandage off when you get home) If your surgery site is on your lower extremity, try wearing loose fitted pants. If your surgery site is on your upper extremity, try wearing a short-sleeve shirt.    Women - do NOT wear make-up. We will be washing it off anyone needing surgery on the face.    Do NOT stop blood thinning medication unless instructed to do so by your surgeon. This will include: Warfarin, Coumadin, Jantoven, Aspirin, Plavix and Pradaxa.    Tylenol is a good alternative to take for headaches and pain, and can be taken throughout your surgery and postoperative recovery.    If your surgery is on your trunk, arms, hands, legs, feet, fingernail or a toenail, please wash the area with Hibiclens, an over-the-counter antiseptic soap, the night before and morning of your surgery.     If you have any questions, please feel free to contact us.  Phone numbers:  During business hours (M-F 8:00-4:30 p.m.)  [facility]  [place] [phone]  ---------------------------------------------------------  Evenings/Weekends/Holidays  [facility] - [phone]    TTY for hearing ldtkahcp-[phone]  *Ask  to page dermatologist on-call  Emergency Zvvb-[phone]  TTY for hearing impaired- [phone]

## 2023-02-01 NOTE — PROGRESS NOTES
[facility] Dermatology Note  Encounter Date: Feb 1, 2023  Office Visit     Dermatology Problem List:  1. SCC, KA type - right upper chest, s/p bx 1/20/23; excision scheduled 3/27/23  2. Scalp irritation, start OTC sal acid shampoo, 2/1/23  ____________________________________________    Assessment & Plan:    # SCC, KA type - right upper chest, s/p bx 1/20/23  - diagnosis and prognosis reviewed with the patient.    - The nature, risks, benefits, and alternatives to excision surgery were discussed. The patient would like to proceed with excision surgery.  - He does not take anticoagulants.   - No indication for preop antibiotics.   - likely repair linear.   - Excision scheduled 3/27/23    # scalp irritation, vertex scalp  Minimal inflammation, but fine scale present.   Recommended patient try salicylic acid shampoo over the counter.   Could consider topical steroid, but it's relatively asymptomatic.     # Benign diagnoses  - Seborrheic keratosis, benign nature reviewed.   - Multiple benign appearing nevi, Clinically benign appearing according to ABCDE criteria.  - Sun damaged skin, though solar lentigines are not themselves dangerous they are a sign of sun damage and future skin cancer risk.     Staff and Scribe:     Scribe Disclosure:   I, Jean Paul Salinas, am serving as a scribe to document services personally performed by this physician, Dr. Javier Easton, based on data collection and the provider's statements to me.     Provider Disclosure:   The documentation recorded by the scribe accurately reflects the services I personally performed and the decisions made by me.    Javier Easton DO    Department of Dermatology  [facility]: Phone: [phone], Fax:[phone]  [facility]: Phone: [phone], Fax: [phone]    ____________________________________________    CC:  Consult For (SCC, KA - right upper chest.  This is his first skin cancer.  No known family hx of melanoma)    HPI:  Mr. Kameron Whitley is a(n) 72 year old male who presents today as a new patient for consult.    Today, the patient notes the biopsy site is healing well. He had a new bump appear and had it biopsied by his primary doctor. He's not had skin cancer screening before. This is his first encounter with skin cancer and skin surgery. He will be traveling starting next week and would prefer to have this treated when he gets back in town.     He notes his scalp has been more dry and scaly in recent years. There is a rough spot on top. He notes intermittent small pimples and sores on his scalp too. He's using a dandruff shampoo with some improvement. He has self diagnosed this as psoriasis.     Patient is otherwise feeling well, without additional skin concerns.    Labs Reviewed:  N/A    Physical Exam:  Vitals: There were no vitals taken for this visit.  SKIN: Waist-up skin, which includes the head/face, neck, both arms, chest, back, abdomen, digits and/or nails was examined.  - R clavicle with pink plaque with central ulcer. ~2cm.  - Multiple tan waxy stuck on appearing papules on the trunk and extremities.   - Multiple skin colored, tan, and pink papules scattered on the trunk and extremities.   - Vertex scalp with poorly defined plaque with fine scale, no erythema or silvery scale.   - scattered follicularly based papules with hemorrhagic crust.   - multiple scattered tan macules reaching confluence in some areas on the forehead, neck, shoulders.     - No other lesions of concern on areas examined.     Medications:  Current Outpatient Medications   Medication     atorvastatin (LIPITOR) 20 MG tablet     sildenafil (VIAGRA) 100 MG tablet     No current facility-administered medications for this visit.      Past Medical History:   Patient Active Problem List   Diagnosis     Impacted cerumen     Groin mass      ED (erectile dysfunction)     Erectile dysfunction due to diseases classified elsewhere     Cholelithiasis with acute cholecystitis without biliary obstruction     Hyperlipidemia LDL goal <130     Disturbance of skin sensation     [place]-neck deformity of finger     Sinus bradycardia     Benign essential hypertension with target blood pressure below 140/90     Skin lesion - right anterior chest at the clavicle.     Past Medical History:   Diagnosis Date     Arthritis      Closed fracture of clavicle     Age 2 - Right Clavicle

## 2023-02-01 NOTE — LETTER
2/1/2023         RE: Kameron Whitley  [address] [place] [address]        Dear Colleague,    Thank you for referring your patient, Kameron Whitley, to the [facility]. Please see a copy of my visit note below.    [facility] D[facility]atology Note  Encounter Date: Feb 1, 2023  Office Visit     Dermatology Problem List:  1. SCC, KA type - right upper chest, s/p bx 1/20/23; excision scheduled 3/27/23  2. Scalp irritation, start OTC sal acid shampoo, 2/1/23  ____________________________________________    Assessment & Plan:    # SCC, KA type - right upper chest, s/p bx 1/20/23  - diagnosis and prognosis reviewed with the patient.    - The nature, risks, benefits, and alternatives to excision surgery were discussed. The patient would like to proceed with excision surgery.  - He does not take anticoagulants.   - No indication for preop antibiotics.   - likely repair linear.   - Excision scheduled 3/27/23    # scalp irritation, vertex scalp  Minimal inflammation, but fine scale present.   Recommended patient try salicylic acid shampoo over the counter.   Could consider topical steroid, but it's relatively asymptomatic.     # Benign diagnoses  - Seborrheic keratosis, benign nature reviewed.   - Multiple benign appearing nevi, Clinically benign appearing according to ABCDE criteria.  - Sun damaged skin, though solar lentigines are not themselves dangerous they are a sign of sun damage and future skin cancer risk.     Staff and Scribe:     Scribe Disclosure:   I, Jean Paul Salinas, am serving as a scribe to document services personally performed by this physician, Dr. Javier Easton, based on data collection and the provider's statements to me.     Provider Disclosure:   The documentation recorded by the scribe accurately reflects the services I personally performed and the decisions made by me.    Javier Easton DO    Department of Dermatology  [facility]: Phone: [phone], Fax:[phone]  [facility]: Phone: [phone], Fax: [phone]    ____________________________________________    CC: Consult For (SCC, KA - right upper chest.  This is his first skin cancer.  No known family hx of melanoma)    HPI:  Mr. Kameron Whitley is a(n) 72 year old male who presents today as a new patient for consult.    Today, the patient notes the biopsy site is healing well. He had a new bump appear and had it biopsied by his primary doctor. He's not had skin cancer screening before. This is his first encounter with skin cancer and skin surgery. He will be traveling starting next week and would prefer to have this treated when he gets back in town.     He notes his scalp has been more dry and scaly in recent years. There is a rough spot on top. He notes intermittent small pimples and sores on his scalp too. He's using a dandruff shampoo with some improvement. He has self diagnosed this as psoriasis.     Patient is otherwise feeling well, without additional skin concerns.    Labs Reviewed:  N/A    Physical Exam:  Vitals: There were no vitals taken for this visit.  SKIN: Waist-up skin, which includes the head/face, neck, both arms, chest, back, abdomen, digits and/or nails was examined.  - R clavicle with pink plaque with central ulcer. ~2cm.  - Multiple tan waxy stuck on appearing papules on the trunk and extremities.   - Multiple skin colored, tan, and pink papules scattered on the trunk and extremities.   - Vertex scalp with poorly defined plaque with fine scale, no erythema or silvery scale.   - scattered follicularly based papules with hemorrhagic crust.   - multiple scattered tan macules reaching confluence in some areas on the forehead, neck, shoulders.     - No other lesions of concern on areas examined.     Medications:  Current Outpatient Medications   Medication      atorvastatin (LIPITOR) 20 MG tablet     sildenafil (VIAGRA) 100 MG tablet     No current facility-administered medications for this visit.      Past Medical History:   Patient Active Problem List   Diagnosis     Impacted cerumen     Groin mass     ED (erectile dysfunction)     Erectile dysfunction due to diseases classified elsewhere     Cholelithiasis with acute cholecystitis without biliary obstruction     Hyperlipidemia LDL goal <130     Disturbance of skin sensation     [place]-neck deformity of finger     Sinus bradycardia     Benign essential hypertension with target blood pressure below 140/90     Skin lesion - right anterior chest at the clavicle.     Past Medical History:   Diagnosis Date     Arthritis      Closed fracture of clavicle     Age 2 - Right Clavicle           Again, thank you for allowing me to participate in the care of your patient.        Sincerely,        Javier Easton MD

## 2023-04-03 ENCOUNTER — OFFICE VISIT (OUTPATIENT)
Dept: URGENT CARE | Facility: URGENT CARE | Age: 73
End: 2023-04-03
Payer: COMMERCIAL

## 2023-04-03 ENCOUNTER — NURSE TRIAGE (OUTPATIENT)
Dept: NURSING | Facility: CLINIC | Age: 73
End: 2023-04-03
Payer: COMMERCIAL

## 2023-04-03 VITALS
SYSTOLIC BLOOD PRESSURE: 145 MMHG | TEMPERATURE: 96.9 F | BODY MASS INDEX: 29.75 KG/M2 | RESPIRATION RATE: 12 BRPM | OXYGEN SATURATION: 97 % | HEART RATE: 60 BPM | DIASTOLIC BLOOD PRESSURE: 84 MMHG | WEIGHT: 170 LBS

## 2023-04-03 DIAGNOSIS — H61.23 EXCESSIVE CERUMEN IN BOTH EAR CANALS: Primary | ICD-10-CM

## 2023-04-03 PROCEDURE — 69209 REMOVE IMPACTED EAR WAX UNI: CPT | Performed by: NURSE PRACTITIONER

## 2023-04-03 NOTE — PROGRESS NOTES
Subjective  Kameorn Whitley is a 72 year old  male who presents today for Ear Wash. with the complaint of  dizziness and blockage  Has been using wax softener drops    O: Patient appears to be a healthy 72 year old year old who denies recent URI symptoms.   Patient is Alert; Oriented to Name, Place and Time,  skin signs are normal.  Ear exam showing wax occlusion in the bilateral ear.     A:(H61.23) Excessive cerumen in both ear canals  (primary encounter diagnosis)    Plan: REMOVE IMPACTED CERUMEN done by MA   Patient tolerated procedure and ears normal after lavage.     Patient instructed to irrigate ears with warm water daily.      EMMY Fairbanks CNP

## 2023-04-03 NOTE — TELEPHONE ENCOUNTER
Nurse Triage SBAR  [place]    Is this a 2nd Level Triage? NO    Situation:   Spoke with 72 yr old Kameron who c/o:    Dizziness and spinning when he gets up from laying down or sitting for the past 10 days.  Some days are worse than others.  Foggy, lightheadedness continues during triage.  Denies severe dizziness.  Current /68, pulse 58.  BP Yesterday 106/65  Pulse mid to upper 50's  to low 60's   Mild HA when feeling dizzy.  Rates pain a 2-3 on a 0-10 pain scale.   Feels some distortion on the outside of the eyes comes and goes.     Background:   No new medications.  Hx of high blood pressure.   Hx of Sinus bradycardia.     Assessment:   Evaluation needed.      Protocol Recommended Disposition:   Go to office now    Recommendation:  Advised OV now per protocol.  Transferred to [facility], no available appointments.  Pt intends to go to [facility].  Care advice given per Dizziness protocol.  Pt voiced understanding.    Tammie Dominique RN  [facility]      Reason for Disposition    Lightheadedness (dizziness) present now, after 2 hours of rest and fluids    Additional Information    Negative: SEVERE difficulty breathing (e.g., struggling for each breath, speaks in single words)    Negative: Shock suspected (e.g., cold/pale/clammy skin, too weak to stand, low BP, rapid pulse)    Negative: Difficult to awaken or acting confused (e.g., disoriented, slurred speech)    Negative: Fainted, and still feels dizzy afterwards    Negative: Overdose (accidental or intentional) of medications    Negative: New neurologic deficit that is present now: * Weakness of the face, arm, or leg on one side of the body * Numbness of the face, arm, or leg on one side of the body * Loss of speech or garbled speech    Negative: Heart beating < 50 beats per minute OR > 140 beats per minute    Negative: Sounds like a life-threatening emergency to the triager    Negative: Chest pain    Negative: Rectal bleeding, bloody stool, or tarry-black  stool    Negative: Vomiting is main symptom    Negative: Diarrhea is main symptom    Negative: Headache is main symptom    Negative: Heat exhaustion suspected (i.e., dehydration from heat exposure)    Negative: Patient states that they are having an anxiety or panic attack    Negative: Dizziness from low blood sugar (i.e., < 60 mg/dl or 3.5 mmol/l)    Negative: SEVERE dizziness (e.g., unable to stand, requires support to walk, feels like passing out now)    Negative: SEVERE headache or neck pain    Negative: Spinning or tilting sensation (vertigo) present now and one or more stroke risk factors (i.e., hypertension, diabetes mellitus, prior stroke/TIA, heart attack, age over 60) (Exception: prior physician evaluation for this AND no different/worse than usual)    Negative: Neurologic deficit that was brief (now gone), ANY of the following:* Weakness of the face, arm, or leg on one side of the body* Numbness of the face, arm, or leg on one side of the body* Loss of speech or garbled speech    Negative: Loss of vision or double vision  (Exception: Similar to previous migraines.)     Comes and goes.  Denies symptoms currently.    Negative: Extra heart beats OR irregular heart beating (i.e., 'palpitations')    Negative: Difficulty breathing    Negative: Drinking very little and has signs of dehydration (e.g., no urine > 12 hours, very dry mouth, very lightheaded)    Negative: Follows bleeding (e.g., stomach, rectum, vagina)  (Exception: Became dizzy from sight of small amount blood.)    Negative: Patient sounds very sick or weak to the triager    Protocols used: DIZZINESS-A-OH

## 2023-04-05 ENCOUNTER — OFFICE VISIT (OUTPATIENT)
Dept: DERMATOLOGY | Facility: CLINIC | Age: 73
End: 2023-04-05
Payer: COMMERCIAL

## 2023-04-05 VITALS — DIASTOLIC BLOOD PRESSURE: 80 MMHG | SYSTOLIC BLOOD PRESSURE: 129 MMHG | OXYGEN SATURATION: 98 % | HEART RATE: 56 BPM

## 2023-04-05 DIAGNOSIS — C44.529 SQUAMOUS CELL CARCINOMA OF CLAVICULAR REGION: ICD-10-CM

## 2023-04-05 PROCEDURE — 11603 EXC TR-EXT MAL+MARG 2.1-3 CM: CPT | Performed by: DERMATOLOGY

## 2023-04-05 PROCEDURE — 88305 TISSUE EXAM BY PATHOLOGIST: CPT | Performed by: DERMATOLOGY

## 2023-04-05 PROCEDURE — 12032 INTMD RPR S/A/T/EXT 2.6-7.5: CPT | Performed by: DERMATOLOGY

## 2023-04-05 ASSESSMENT — PAIN SCALES - GENERAL: PAINLEVEL: NO PAIN (0)

## 2023-04-05 NOTE — PATIENT INSTRUCTIONS
Excision Wound Care Instructions with Steri Strips    Possible complications of any surgical procedure are bleeding, infection, scarring, alteration in skin color and sensation, muscle weakness in the area, wound dehiscence or seperation, or recurrence of the lesion or disease. On occasion, after healing, a secondary procedure or revision may be recommended in order to obtain the best cosmetic or functional result.     After your surgery, a pressure bandage will be placed over the area that has sutures. This will help prevent bleeding. Please, follow these instructions as they will help you to prevent complications as your wound heals.    For the First 48 hours After Surgery:    Leave the pressure bandage on and keep it dry. If it should come loose, you may retape it, but do not take it off.    Relax and take it easy. Do not do any vigorous exercise, heavy lifting, or bending forward. This could cause the wound to bleed.    Post-operative pain is usually mild. You may alternate between 1000 mg of Tylenol (acetaminophen) and 400 mg of Ibuprofen every 4 hours.  Do not take more than 4,000 mg of acetaminophen and more than 3200 mg of Ibuprofen in a 24 hr period.  Avoid alcohol and vitamin E as these may increase your tendency to bleed.    You may put an ice pack around the bandaged area for 20 minutes every 2-3 hours. This may help reduce swelling, bruising, and pain. Make sure the ice pack is waterproof so that the pressure bandage does not get wet.     You may see a small amount of drainage or blood on your pressure bandage.  This is normal.  However, if drainage or bleeding continues or saturates the bandage, you will need to apply firm pressure over the bandage with a washcloth for 15 minutes. If bleeding continues after applying pressure for 15 minutes then go to the nearest emergency room.      48 Hours After Surgery:    Remove outer white bandage down to clear plastic film (Tegaderm).    Leave the clear plastic  film (Tegaderm) on for up to 2 weeks, as long as it is intact.  If it falls off prior to 2 weeks follow daily wound care below.  If it stays intact for the full 2 weeks, then remove and treat as normal, healthy skin.      Daily Wound Care (if Tegaderm and Steri-Strips fall off prior to 2 weeks):    Wash wound with a mild soap and water.  Use caution when washing the wound, be gentle and do not let the forceful shower stream hit the wound directly. DO NOT WASH WITH HYDROGEN PEROXIDE AS THIS MIGHT CAUSE THE STITCHES TO DISSOLVE FASTER THAN WHAT WE WANT.    Pat dry.    Apply Vaseline (from a new container or tube) over the suture line with a Q-tip until it is completely healed. It is very important to keep the wound continuously moist, as wounds heal best in a moist environment.    Keep the site covered until it's healed.  You can cover it with a Telfa (non-stick) dressing and tape or a band-aid until healed with normal, healthy skin.      Call Us If:    You have pain that is not controlled with Tylenol/Ibuprofen.    You have signs or symptoms of an infection, such as: fever over 100 degrees F, redness, warmth, or foul-smelling or yellow/creamy drainage from the wound.      Who should I call with questions?  [facility]: [phone]  [facility]: [phone]  For urgent needs outside of business hours call the [facility] at [phone] and ask for the dermatology resident on call

## 2023-04-05 NOTE — PROGRESS NOTES
The following medication was given:     MEDICATION:  Lidocaine with epinephrine 1% 1:[id number]  ROUTE: SQ  SITE: see procedure note  DOSE: 6 mL  LOT #: [id number]  : [employer]  EXPIRATION DATE: 09/30/2024  NDC#: 66562-426-23  Was there drug waste? no  Multi-dose vial: Yes    Zohreh Ramachandran LPN  April 5, 2023    Mastisol, Steri-Strips, Tegaderm and pressure dressing applied to excision site on Right upper chest.  Wound care instructions reviewed with patient and AVS provided.  Patient verbalized understanding.  Patient will follow up for suture removal: N/A.  No further questions or concerns at this time.

## 2023-04-05 NOTE — PROGRESS NOTES
DERMATOLOGY EXCISION PROCEDURE NOTE    Dermatology Problem List:  1. 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 Scalp irritation, start OTC sal acid shampoo, 2/1/23  ____________________________________________    NAME OF PROCEDURE: Excision intermediate layered linear closure  Staff surgeon: Dr. Easton  Scrub Nurse: Zohreh Ramachandran LPN    PRE-OPERATIVE DIAGNOSIS:  Squamous cell carcinoma  POST-OPERATIVE DIAGNOSIS: Same   LOCATION: Right upper chest   FINAL EXCISION SIZE(DEFECT SIZE):  2.6 x 1.6 cm  MARGIN: 0.4 cm  FINAL REPAIR LENGTH: 5.0 cm   ANESTHESIA:  1% lidocaine with 1:100,000 epinephrine    INDICATIONS: This patient presented with a 1.8 x 0.8 cm Squamous cell carcinoma. Excision was indicated. We discussed the principles of treatment and most likely complications including scarring, bleeding, infection, incomplete excision, wound dehiscence, pain, nerve damage, and recurrence. Informed consent was obtained and the patient underwent the procedure as follows:    PROCEDURE: The patient was taken to the operative suite. Time-out was performed.  The treatment area was anesthetized with 1% lidocaine with epinephrine. The area was prepped with Chlorhexidine and rinsed with sterile saline and draped with sterile towels. The lesion was delineated and excised down to subcutaneous fat in a elliptical manner. Hemostasis was obtained by electrocoagulation.     REPAIR: An intermediate layered linear closure was selected as the procedure which would maximally preserve both function and cosmesis.    After the excision of the tumor, the area was carefully  undermined. Hemostasis was obtained with bipolar electrocoagulation.  Closure was oriented so that the wound was in the patient's natural skin tension lines. The deep subcutaneous and dermal layers were then closed with 4-0 Monocryl sutures. The epidermis was then carefully approximated along the length of the wound using 4-0 Monocryl running  subcuticular sutures.     Estimated blood loss was less than 10 ml for all surgical sites. A sterile pressure dressing was applied and wound care instructions, with a written handout, were given. The patient was discharged from the [facility] alert and ambulatory.    The patient elected for pathology results to automatically release and understands that the clinical staff will contact them as soon as possible to notify them of the results.      Dr. Easton was immediately available for the entire surgery and was physicially present for the key portions of the procedure.    Anatomic Pathology Results: pending    Clinical Follow-Up: 6 month skin check in October with Dr. Coffman.     Staff Involved:  Staff Only    I personally performed the procedures today.    Javier Easton DO    Department of Dermatology  [facility]: Phone: [phone], Fax:[phone]  [facility]: Phone: [phone], Fax: [phone]

## 2023-04-05 NOTE — LETTER
4/5/2023         RE: Kameron Whitley  [address] [place] [address]        Dear Colleague,    Thank you for referring your patient, Kameron Whitley, to the [facility]. Please see a copy of my visit note below.    DERMATOLOGY EXCISION PROCEDURE NOTE    Dermatology Problem List:  1. 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 Scalp irritation, start OTC sal acid shampoo, 2/1/23  ____________________________________________    NAME OF PROCEDURE: Excision intermediate layered linear closure  Staff surgeon: Dr. Easton  Scrub Nurse: Zohreh Ramachandran LPN    PRE-OPERATIVE DIAGNOSIS:  Squamous cell carcinoma  POST-OPERATIVE DIAGNOSIS: Same   LOCATION: Right upper chest   FINAL EXCISION SIZE(DEFECT SIZE):  2.6 x 1.6 cm  MARGIN: 0.4 cm  FINAL REPAIR LENGTH: 5.0 cm   ANESTHESIA:  1% lidocaine with 1:100,000 epinephrine    INDICATIONS: This patient presented with a 1.8 x 0.8 cm Squamous cell carcinoma. Excision was indicated. We discussed the principles of treatment and most likely complications including scarring, bleeding, infection, incomplete excision, wound dehiscence, pain, nerve damage, and recurrence. Informed consent was obtained and the patient underwent the procedure as follows:    PROCEDURE: The patient was taken to the operative suite. Time-out was performed.  The treatment area was anesthetized with 1% lidocaine with epinephrine. The area was prepped with Chlorhexidine and rinsed with sterile saline and draped with sterile towels. The lesion was delineated and excised down to subcutaneous fat in a elliptical manner. Hemostasis was obtained by electrocoagulation.     REPAIR: An intermediate layered linear closure was selected as the procedure which would maximally preserve both function and cosmesis.    After the excision of the tumor, the area was carefully  undermined. Hemostasis was obtained with bipolar electrocoagulation.  Closure was oriented so that  the wound was in the patient's natural skin tension lines. The deep subcutaneous and dermal layers were then closed with 4-0 Monocryl sutures. The epidermis was then carefully approximated along the length of the wound using 4-0 Monocryl running subcuticular sutures.     Estimated blood loss was less than 10 ml for all surgical sites. A sterile pressure dressing was applied and wound care instructions, with a written handout, were given. [facility] and [facility]ulatory.    The patient elected for pathology results to automatically release and understands that the clinical staff will contact them as soon as possible to notify them of the results.      Dr. Easton was immediately available for the entire surgery and was physicially present for the key portions of the procedure.    Anatomic Pathology Results: pending    Clinical Follow-Up: 6 month skin check in October with Dr. Coffman.     Staff Involved:  Staff Only    I personally performed the procedures today.    Javier Easton DO    Department of Dermatology  [facility]: Phone: [phone], Fax:[phone]  [facility]: Phone: [phone], Fax: [phone]        The following medication was given:     MEDICATION:  Lidocaine with epinephrine 1% [id number]  ROUTE: SQ  SITE: see procedure note  DOSE: 6 mL  LOT #: [id number]  : [employer]  EXPIRATION DATE: 09/30/2024  NDC#: 23736-238-80  Was there drug waste? no  Multi-dose vial: Yes    Zohreh Ramachandran LPN  April 5, 2023    Mastisol, Steri-Strips, Tegaderm and pressure dressing applied to excision site on Right upper chest.  Wound care instructions reviewed with patient and AVS provided.  Patient verbalized understanding.  Patient will follow up for suture removal: N/A.  No further questions or concerns at this time.      Again,  thank you for allowing me to participate in the care of your patient.        Sincerely,        Javier Easton MD

## 2023-04-06 DIAGNOSIS — E78.5 HYPERLIPIDEMIA LDL GOAL <130: ICD-10-CM

## 2023-04-06 RX ORDER — ATORVASTATIN CALCIUM 20 MG/1
10 TABLET, FILM COATED ORAL DAILY
Qty: 90 TABLET | Refills: 1 | Status: SHIPPED | OUTPATIENT
Start: 2023-04-06 | End: 2023-11-17

## 2023-04-06 NOTE — TELEPHONE ENCOUNTER
Pending Prescriptions:                       Disp   Refills    atorvastatin (LIPITOR) 20 MG tablet        1 tabl*0        Sig: Take 0.5 tablets (10 mg) by mouth daily    Routing refill request to provider for review/approval because:  Medication is reported/historical

## 2023-04-07 LAB
PATH REPORT.COMMENTS IMP SPEC: NORMAL
PATH REPORT.COMMENTS IMP SPEC: NORMAL
PATH REPORT.FINAL DX SPEC: NORMAL
PATH REPORT.GROSS SPEC: NORMAL
PATH REPORT.MICROSCOPIC SPEC OTHER STN: NORMAL
PATH REPORT.RELEVANT HX SPEC: NORMAL

## 2023-04-10 ENCOUNTER — TELEPHONE (OUTPATIENT)
Dept: DERMATOLOGY | Facility: CLINIC | Age: 73
End: 2023-04-10
Payer: COMMERCIAL

## 2023-04-10 NOTE — TELEPHONE ENCOUNTER
Pt called and notified of pathology results. He stated that the wound is feeling well. He will call with any concerns.    Elva Brar RN on 4/10/2023 at 2:55 PM

## 2023-10-12 ENCOUNTER — IMMUNIZATION (OUTPATIENT)
Dept: FAMILY MEDICINE | Facility: CLINIC | Age: 73
End: 2023-10-12
Payer: COMMERCIAL

## 2023-10-12 PROCEDURE — G0008 ADMIN INFLUENZA VIRUS VAC: HCPCS

## 2023-10-12 PROCEDURE — 90662 IIV NO PRSV INCREASED AG IM: CPT

## 2023-10-30 ENCOUNTER — OFFICE VISIT (OUTPATIENT)
Dept: DERMATOLOGY | Facility: CLINIC | Age: 73
End: 2023-10-30
Payer: COMMERCIAL

## 2023-10-30 DIAGNOSIS — D22.9 MULTIPLE MELANOCYTIC NEVI: ICD-10-CM

## 2023-10-30 DIAGNOSIS — Z85.828 HISTORY OF NONMELANOMA SKIN CANCER: ICD-10-CM

## 2023-10-30 DIAGNOSIS — L21.9 DERMATITIS, SEBORRHEIC: ICD-10-CM

## 2023-10-30 DIAGNOSIS — L82.1 SEBORRHEIC KERATOSIS: Primary | ICD-10-CM

## 2023-10-30 DIAGNOSIS — D18.01 CHERRY ANGIOMA: ICD-10-CM

## 2023-10-30 DIAGNOSIS — D17.0 LIPOMA OF NECK: ICD-10-CM

## 2023-10-30 DIAGNOSIS — L57.0 ACTINIC KERATOSIS: ICD-10-CM

## 2023-10-30 DIAGNOSIS — L81.4 SOLAR LENTIGO: ICD-10-CM

## 2023-10-30 PROCEDURE — 17003 DESTRUCT PREMALG LES 2-14: CPT | Performed by: DERMATOLOGY

## 2023-10-30 PROCEDURE — 99213 OFFICE O/P EST LOW 20 MIN: CPT | Mod: 25 | Performed by: DERMATOLOGY

## 2023-10-30 PROCEDURE — 17000 DESTRUCT PREMALG LESION: CPT | Performed by: DERMATOLOGY

## 2023-10-30 NOTE — NURSING NOTE
Kameron Whitley's goals for this visit include:   Chief Complaint   Patient presents with    Skin Check     Full body skin check. Spot on right side. Personal history of NMSC.       He requests these members of his care team be copied on today's visit information:     PCP: Nicola Godoy    Referring Provider:  No referring provider defined for this encounter.    There were no vitals taken for this visit.    Do you need any medication refills at today's visit? China Hoffman CMA

## 2023-10-30 NOTE — LETTER
10/30/2023         RE: Kameron Whitley  [address] [place] [address]        Dear Colleague,    Thank you for referring your patient, Kameron Whitley, to the [facility]. Please see a copy of my visit note below.    [facility] Dermatology Note    Encounter Date: Oct 30, 2023    Dermatology Problem List:  1. 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 Scalp irritation, start OTC sal acid shampoo, 2/1/23    ______________________________________    Impression/Plan:  1. History of nonmelanoma skin cancer  - no evidence of recurrence today  2. AKs x3  - Cryotherapy procedure note: After verbal consent and discussion of risks and benefits including, but not limited to, dyspigmentation/scar, blister, and pain,3 were treated with 1-2 mm freeze border for 1-2 cycles with liquid nitrogen. Post cryotherapy instructions were provided.    3. Reassurance provided for benign lesions not treated today including cherry angiomata, solar lentigines, seborrheic keratoses, lipoma, and banal-appearing melanocytic nevi.   4. Seborrheic dermatitis  - start OTC moisturizers and anti-dandruff shampoos      Follow-up in 6 months.      Staff Involved:  Staff and Scribe    I, Carmela Carlson, am serving as a scribe; to document services personally performed by Jean Marie Green MD -based on data collection and the provider's statements to me.     Provider Disclosure:   The documentation recorded by the scribe accurately reflects the services I personally performed and the decisions made by me.    Jean Marie Green MD   of Dermatology  Department of Dermatology  [facility]        CC:   Chief Complaint   Patient presents with     Skin Check     Full body skin check. Spot on right side. Personal history of NMSC.       History of Present Illness:  Mr. Kameron Whitley is a 73 year old male who presents as a return  patient. He is here today for a FBSE, has a spot of concern on the back. Reports dryness and scabbing on scalp, reports using special shampoo for this.       Labs:  N/A    Physical exam:  Vitals: There were no vitals taken for this visit.  GEN: This is a well developed, well-nourished male in no acute distress, in a pleasant mood.    SKIN: [place] phototype 2  - Full skin, which includes the head/face, both arms, chest, back, abdomen,both legs, genitalia and/or groin buttocks, digits and/or nails, was examined.   - There are dome shaped bright red papules on the head/neck, trunk, extremities.   - Multiple regular brown pigmented macules and papules are identified on the head/neck, trunk, extremities.   - Scattered brown macules on sun exposed areas.  - There are waxy stuck on tan to brown papules on the head/neck, trunk, extremities.   - soft subcutaneous nodule on the posterior neck  - scaling on the crown of the scalp  - No other lesions of concern on areas examined.     Past Medical History:   Past Medical History:   Diagnosis Date     Arthritis      Closed fracture of clavicle     Age 2 - Right Clavicle     Past Surgical History:   Procedure Laterality Date     ARTHRODESIS FINGER(S) Right 2/8/2019    Procedure: Right index finger fusion;  Surgeon: EMILIANA Aguilar MD;  Location: [facility] OR     CARPAL TUNNEL RELEASE RT/LT  1/2006    right hand     COLONOSCOPY  4/2009    Normal-f/u 10 years (At [facility])     COLONOSCOPY N/A 2/25/2019    Procedure: COMBINED COLONOSCOPY, SINGLE POLYPECTOMY BY BIOPSY;  Surgeon: Brendan Nunes MD;  Location:  GI     LAPAROSCOPIC CHOLECYSTECTOMY N/A 10/5/2016    Procedure: LAPAROSCOPIC CHOLECYSTECTOMY;  Surgeon: Nikita Anderson MD;  Location: [facility] OR     LAPAROSCOPIC HERNIORRHAPHY INGUINAL BILATERAL  1/10/2014    Procedure: LAPAROSCOPIC HERNIORRHAPHY INGUINAL BILATERAL;  Laparoscopic Bilateral Inguinal Hernia Repair;  Surgeon: Nikita Anderson MD;  Location:  OR        Social History:   reports that he quit smoking about 45 years ago. His smoking use included cigarettes. He started smoking about 54 years ago. He has a 5.00 pack-year smoking history. He has never used smokeless tobacco. He reports current alcohol use of about 1.0 standard drink of alcohol per week. He reports that he does not use drugs.    Family History:  Family History   Problem Relation Age of Onset     Diabetes Mother      Arthritis Mother      Diabetes Brother      Diabetes Brother        Medications:  Current Outpatient Medications   Medication Sig Dispense Refill     atorvastatin (LIPITOR) 20 MG tablet Take 0.5 tablets (10 mg) by mouth daily 90 tablet 1     sildenafil (VIAGRA) 100 MG tablet Take 1 tablet (100 mg) by mouth daily as needed (erectile dysfunction) 30 tablet 1     No Known Allergies                Again, thank you for allowing me to participate in the care of your patient.        Sincerely,        Jean Marie Green MD

## 2023-10-30 NOTE — PROGRESS NOTES
[facility] Dermatology Note    Encounter Date: Oct 30, 2023    Dermatology Problem List:  1. 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 Scalp irritation, start OTC sal acid shampoo, 2/1/23    ______________________________________    Impression/Plan:  1. History of nonmelanoma skin cancer  - no evidence of recurrence today  2. AKs x3  - Cryotherapy procedure note: After verbal consent and discussion of risks and benefits including, but not limited to, dyspigmentation/scar, blister, and pain,3 were treated with 1-2 mm freeze border for 1-2 cycles with liquid nitrogen. Post cryotherapy instructions were provided.    3. Reassurance provided for benign lesions not treated today including cherry angiomata, solar lentigines, seborrheic keratoses, lipoma, and banal-appearing melanocytic nevi.   4. Seborrheic dermatitis  - start OTC moisturizers and anti-dandruff shampoos      Follow-up in 6 months.      Staff Involved:  Staff and Scribe    I, Carmela Carlson, am serving as a scribe; to document services personally performed by Jean Marie Green MD -based on data collection and the provider's statements to me.     Provider Disclosure:   The documentation recorded by the scribe accurately reflects the services I personally performed and the decisions made by me.    Jean Marie Green MD   of Dermatology  Department of Dermatology  [facility]        CC:   Chief Complaint   Patient presents with    Skin Check     Full body skin check. Spot on right side. Personal history of NMSC.       History of Present Illness:  Mr. Kameron Whitley is a 73 year old male who presents as a return patient. He is here today for a FBSE, has a spot of concern on the back. Reports dryness and scabbing on scalp, reports using special shampoo for this.       Labs:  N/A    Physical exam:  Vitals: There were no vitals taken for this visit.  GEN: This is a well  developed, well-nourished male in no acute distress, in a pleasant mood.    SKIN: [place] phototype 2  - Full skin, which includes the head/face, both arms, chest, back, abdomen,both legs, genitalia and/or groin buttocks, digits and/or nails, was examined.   - There are dome shaped bright red papules on the head/neck, trunk, extremities.   - Multiple regular brown pigmented macules and papules are identified on the head/neck, trunk, extremities.   - Scattered brown macules on sun exposed areas.  - There are waxy stuck on tan to brown papules on the head/neck, trunk, extremities.   - soft subcutaneous nodule on the posterior neck  - scaling on the crown of the scalp  - No other lesions of concern on areas examined.     Past Medical History:   Past Medical History:   Diagnosis Date    Arthritis     Closed fracture of clavicle     Age 2 - Right Clavicle     Past Surgical History:   Procedure Laterality Date    ARTHRODESIS FINGER(S) Right 2/8/2019    Procedure: Right index finger fusion;  Surgeon: EMILIANA Aguilar MD;  Location: [facility] OR    CARPAL TUNNEL RELEASE RT/LT  1/2006    right hand    COLONOSCOPY  4/2009    Normal-f/u 10 years (At [facility])    COLONOSCOPY N/A 2/25/2019    Procedure: COMBINED COLONOSCOPY, SINGLE POLYPECTOMY BY BIOPSY;  Surgeon: Brendan Nunes MD;  Location:  GI    LAPAROSCOPIC CHOLECYSTECTOMY N/A 10/5/2016    Procedure: LAPAROSCOPIC CHOLECYSTECTOMY;  Surgeon: Nikita Anderson MD;  Location:  OR    LAPAROSCOPIC HERNIORRHAPHY INGUINAL BILATERAL  1/10/2014    Procedure: LAPAROSCOPIC HERNIORRHAPHY INGUINAL BILATERAL;  Laparoscopic Bilateral Inguinal Hernia Repair;  Surgeon: Nikita Anderson MD;  Location:  OR       Social History:   reports that he quit smoking about 45 years ago. His smoking use included cigarettes. He started smoking about 54 years ago. He has a 5.00 pack-year smoking history. He has never used smokeless tobacco. He reports current alcohol use of about 1.0  standard drink of alcohol per week. He reports that he does not use drugs.    Family History:  Family History   Problem Relation Age of Onset    Diabetes Mother     Arthritis Mother     Diabetes Brother     Diabetes Brother        Medications:  Current Outpatient Medications   Medication Sig Dispense Refill    atorvastatin (LIPITOR) 20 MG tablet Take 0.5 tablets (10 mg) by mouth daily 90 tablet 1    sildenafil (VIAGRA) 100 MG tablet Take 1 tablet (100 mg) by mouth daily as needed (erectile dysfunction) 30 tablet 1     No Known Allergies

## 2023-11-02 ENCOUNTER — MYC MEDICAL ADVICE (OUTPATIENT)
Dept: FAMILY MEDICINE | Facility: OTHER | Age: 73
End: 2023-11-02
Payer: COMMERCIAL

## 2023-11-02 DIAGNOSIS — R00.1 SINUS BRADYCARDIA: ICD-10-CM

## 2023-11-02 DIAGNOSIS — I10 BENIGN ESSENTIAL HYPERTENSION WITH TARGET BLOOD PRESSURE BELOW 140/90: Primary | ICD-10-CM

## 2023-11-02 DIAGNOSIS — E78.5 HYPERLIPIDEMIA LDL GOAL <130: ICD-10-CM

## 2023-11-09 ENCOUNTER — IMMUNIZATION (OUTPATIENT)
Dept: FAMILY MEDICINE | Facility: CLINIC | Age: 73
End: 2023-11-09
Payer: COMMERCIAL

## 2023-11-09 ENCOUNTER — MYC MEDICAL ADVICE (OUTPATIENT)
Dept: FAMILY MEDICINE | Facility: OTHER | Age: 73
End: 2023-11-09

## 2023-11-09 ENCOUNTER — LAB (OUTPATIENT)
Dept: LAB | Facility: CLINIC | Age: 73
End: 2023-11-09
Payer: COMMERCIAL

## 2023-11-09 DIAGNOSIS — R00.1 SINUS BRADYCARDIA: ICD-10-CM

## 2023-11-09 DIAGNOSIS — I10 BENIGN ESSENTIAL HYPERTENSION WITH TARGET BLOOD PRESSURE BELOW 140/90: ICD-10-CM

## 2023-11-09 DIAGNOSIS — R73.9 HYPERGLYCEMIA: ICD-10-CM

## 2023-11-09 DIAGNOSIS — E78.5 HYPERLIPIDEMIA LDL GOAL <130: ICD-10-CM

## 2023-11-09 DIAGNOSIS — R73.9 HYPERGLYCEMIA: Primary | ICD-10-CM

## 2023-11-09 LAB
ALBUMIN SERPL BCG-MCNC: 4.2 G/DL (ref 3.5–5.2)
ALP SERPL-CCNC: 77 U/L (ref 40–129)
ALT SERPL W P-5'-P-CCNC: 28 U/L (ref 0–70)
ANION GAP SERPL CALCULATED.3IONS-SCNC: 10 MMOL/L (ref 7–15)
AST SERPL W P-5'-P-CCNC: 30 U/L (ref 0–45)
BILIRUB SERPL-MCNC: 0.8 MG/DL
BUN SERPL-MCNC: 17.3 MG/DL (ref 8–23)
CALCIUM SERPL-MCNC: 9.4 MG/DL (ref 8.8–10.2)
CHLORIDE SERPL-SCNC: 103 MMOL/L (ref 98–107)
CHOLEST SERPL-MCNC: 128 MG/DL
CREAT SERPL-MCNC: 1.12 MG/DL (ref 0.67–1.17)
DEPRECATED HCO3 PLAS-SCNC: 28 MMOL/L (ref 22–29)
EGFRCR SERPLBLD CKD-EPI 2021: 69 ML/MIN/1.73M2
GLUCOSE SERPL-MCNC: 110 MG/DL (ref 70–99)
HBA1C MFR BLD: 5.7 %
HDLC SERPL-MCNC: 70 MG/DL
HGB BLD-MCNC: 15.7 G/DL (ref 13.3–17.7)
LDLC SERPL CALC-MCNC: 43 MG/DL
NONHDLC SERPL-MCNC: 58 MG/DL
POTASSIUM SERPL-SCNC: 4 MMOL/L (ref 3.4–5.3)
PROT SERPL-MCNC: 7 G/DL (ref 6.4–8.3)
PSA SERPL DL<=0.01 NG/ML-MCNC: 0.77 NG/ML (ref 0–6.5)
SODIUM SERPL-SCNC: 141 MMOL/L (ref 135–145)
TRIGL SERPL-MCNC: 77 MG/DL

## 2023-11-09 PROCEDURE — 80053 COMPREHEN METABOLIC PANEL: CPT

## 2023-11-09 PROCEDURE — 90480 ADMN SARSCOV2 VAC 1/ONLY CMP: CPT

## 2023-11-09 PROCEDURE — G0103 PSA SCREENING: HCPCS

## 2023-11-09 PROCEDURE — 83036 HEMOGLOBIN GLYCOSYLATED A1C: CPT

## 2023-11-09 PROCEDURE — 91320 SARSCV2 VAC 30MCG TRS-SUC IM: CPT

## 2023-11-09 PROCEDURE — 85018 HEMOGLOBIN: CPT

## 2023-11-09 PROCEDURE — 80061 LIPID PANEL: CPT

## 2023-11-09 PROCEDURE — 36415 COLL VENOUS BLD VENIPUNCTURE: CPT

## 2023-11-15 ASSESSMENT — ENCOUNTER SYMPTOMS
SHORTNESS OF BREATH: 0
HEMATOCHEZIA: 0
WEAKNESS: 0
ABDOMINAL PAIN: 0
HEARTBURN: 0
DYSURIA: 0
HEMATURIA: 0
FREQUENCY: 0
MYALGIAS: 0
PARESTHESIAS: 0
EYE PAIN: 0
DIZZINESS: 0
FEVER: 0
PALPITATIONS: 0
COUGH: 0
NAUSEA: 0
CHILLS: 0
SORE THROAT: 0
JOINT SWELLING: 0
HEADACHES: 0
ARTHRALGIAS: 1
CONSTIPATION: 0
DIARRHEA: 0
NERVOUS/ANXIOUS: 0

## 2023-11-15 ASSESSMENT — ACTIVITIES OF DAILY LIVING (ADL): CURRENT_FUNCTION: NO ASSISTANCE NEEDED

## 2023-11-17 ENCOUNTER — ANCILLARY PROCEDURE (OUTPATIENT)
Dept: GENERAL RADIOLOGY | Facility: OTHER | Age: 73
End: 2023-11-17
Attending: PHYSICIAN ASSISTANT
Payer: COMMERCIAL

## 2023-11-17 ENCOUNTER — OFFICE VISIT (OUTPATIENT)
Dept: FAMILY MEDICINE | Facility: OTHER | Age: 73
End: 2023-11-17
Payer: COMMERCIAL

## 2023-11-17 VITALS
WEIGHT: 164.5 LBS | TEMPERATURE: 97.2 F | OXYGEN SATURATION: 97 % | DIASTOLIC BLOOD PRESSURE: 78 MMHG | BODY MASS INDEX: 29.15 KG/M2 | HEART RATE: 60 BPM | RESPIRATION RATE: 20 BRPM | HEIGHT: 63 IN | SYSTOLIC BLOOD PRESSURE: 130 MMHG

## 2023-11-17 DIAGNOSIS — M25.552 HIP PAIN, LEFT: ICD-10-CM

## 2023-11-17 DIAGNOSIS — Z00.00 ENCOUNTER FOR MEDICARE ANNUAL WELLNESS EXAM: ICD-10-CM

## 2023-11-17 DIAGNOSIS — M25.562 CHRONIC PAIN OF LEFT KNEE: ICD-10-CM

## 2023-11-17 DIAGNOSIS — E78.5 HYPERLIPIDEMIA LDL GOAL <130: ICD-10-CM

## 2023-11-17 DIAGNOSIS — G89.29 CHRONIC PAIN OF LEFT KNEE: ICD-10-CM

## 2023-11-17 DIAGNOSIS — N39.44 NOCTURNAL ENURESIS: ICD-10-CM

## 2023-11-17 DIAGNOSIS — S46.911A RIGHT SHOULDER STRAIN, INITIAL ENCOUNTER: ICD-10-CM

## 2023-11-17 DIAGNOSIS — Z12.5 SCREENING FOR PROSTATE CANCER: ICD-10-CM

## 2023-11-17 DIAGNOSIS — R73.03 PREDIABETES: ICD-10-CM

## 2023-11-17 DIAGNOSIS — H91.93 HEARING DEFICIT, BILATERAL: ICD-10-CM

## 2023-11-17 DIAGNOSIS — Z23 NEED FOR SHINGLES VACCINE: ICD-10-CM

## 2023-11-17 DIAGNOSIS — Z00.00 ROUTINE HISTORY AND PHYSICAL EXAMINATION OF ADULT: Primary | ICD-10-CM

## 2023-11-17 DIAGNOSIS — Z29.11 NEED FOR VACCINATION AGAINST RESPIRATORY SYNCYTIAL VIRUS: ICD-10-CM

## 2023-11-17 PROCEDURE — 73560 X-RAY EXAM OF KNEE 1 OR 2: CPT | Mod: TC | Performed by: RADIOLOGY

## 2023-11-17 PROCEDURE — 73522 X-RAY EXAM HIPS BI 3-4 VIEWS: CPT | Mod: TC | Performed by: RADIOLOGY

## 2023-11-17 PROCEDURE — 99214 OFFICE O/P EST MOD 30 MIN: CPT | Mod: 25 | Performed by: PHYSICIAN ASSISTANT

## 2023-11-17 PROCEDURE — 73030 X-RAY EXAM OF SHOULDER: CPT | Mod: TC | Performed by: RADIOLOGY

## 2023-11-17 PROCEDURE — G0439 PPPS, SUBSEQ VISIT: HCPCS | Performed by: PHYSICIAN ASSISTANT

## 2023-11-17 RX ORDER — TAMSULOSIN HYDROCHLORIDE 0.4 MG/1
0.4 CAPSULE ORAL DAILY
Qty: 90 CAPSULE | Refills: 3 | Status: SHIPPED | OUTPATIENT
Start: 2023-11-17

## 2023-11-17 RX ORDER — RESPIRATORY SYNCYTIAL VIRUS VACCINE 120MCG/0.5
0.5 KIT INTRAMUSCULAR ONCE
Qty: 1 EACH | Refills: 0 | Status: CANCELLED | OUTPATIENT
Start: 2023-11-17 | End: 2023-11-17

## 2023-11-17 RX ORDER — ATORVASTATIN CALCIUM 20 MG/1
10 TABLET, FILM COATED ORAL DAILY
Qty: 90 TABLET | Refills: 1 | Status: SHIPPED | OUTPATIENT
Start: 2023-11-17

## 2023-11-17 ASSESSMENT — ENCOUNTER SYMPTOMS
JOINT SWELLING: 0
SORE THROAT: 0
HEMATOCHEZIA: 0
DYSURIA: 0
HEMATURIA: 0
WEAKNESS: 0
COUGH: 0
DIARRHEA: 0
NAUSEA: 0
DIZZINESS: 0
ABDOMINAL PAIN: 0
CHILLS: 0
PALPITATIONS: 0
ARTHRALGIAS: 1
FEVER: 0
SHORTNESS OF BREATH: 0
PARESTHESIAS: 0
HEARTBURN: 0
CONSTIPATION: 0
NERVOUS/ANXIOUS: 0
HEADACHES: 0
FREQUENCY: 0
MYALGIAS: 0
EYE PAIN: 0

## 2023-11-17 ASSESSMENT — PAIN SCALES - GENERAL: PAINLEVEL: MILD PAIN (2)

## 2023-11-17 ASSESSMENT — ACTIVITIES OF DAILY LIVING (ADL): CURRENT_FUNCTION: NO ASSISTANCE NEEDED

## 2023-11-17 NOTE — PROGRESS NOTES
"SUBJECTIVE:   Kameron is a 73 year old, presenting for the following:  Physical        11/17/2023     9:10 AM   Additional Questions   Roomed by Johana   Accompanied by Self         11/17/2023     9:10 AM   Patient Reported Additional Medications   Patient reports taking the following new medications NA       Are you in the first 12 months of your Medicare coverage?  No    Healthy Habits:     In general, how would you rate your overall health?  Good    Frequency of exercise:  4-5 days/week    Duration of exercise:  30-45 minutes    Do you usually eat at least 4 servings of fruit and vegetables a day, include whole grains    & fiber and avoid regularly eating high fat or \"junk\" foods?  Yes    Taking medications regularly:  Yes    Barriers to taking medications:  None    Medication side effects:  None    Ability to successfully perform activities of daily living:  No assistance needed    Home Safety:  No safety concerns identified    Hearing Impairment:  Difficulty following a conversation in a noisy restaurant or crowded room, feel that people are mumbling or not speaking clearly, need to ask people to speak up or repeat themselves, find that men's voices are easier to understand than woman's and difficulty understanding soft or whispered speech    In the past 6 months, have you been bothered by leaking of urine?  No    In general, how would you rate your overall mental or emotional health?  Excellent    Additional concerns today:  No      Today's PHQ-2 Score:       11/17/2023     9:07 AM   PHQ-2 ( 1999 [employer])   Q1: Little interest or pleasure in doing things 0   Q2: Feeling down, depressed or hopeless 0   PHQ-2 Score 0   Q1: Little interest or pleasure in doing things Not at all   Q2: Feeling down, depressed or hopeless Not at all   PHQ-2 Score 0           Have you ever done Advance Care Planning? (For example, a Health Directive, POLST, or a discussion with a medical provider or your loved ones about your wishes): " No, advance care planning information given to patient to review.  Patient plans to discuss their wishes with loved ones or provider.         Fall risk  Fallen 2 or more times in the past year?: No  Any fall with injury in the past year?: No    Cognitive Screening   1) Repeat 3 items (Leader, Season, Table)    2) Clock draw: NORMAL  3) 3 item recall: Recalls 3 objects  Results: 3 items recalled: COGNITIVE IMPAIRMENT LESS LIKELY    Mini-CogTM Copyright ADAL Crouch. Licensed by the author for use in [facility]; reprinted with permission (isrrael@[email or web address]). All rights reserved.      Do you have sleep apnea, excessive snoring or daytime drowsiness? : no    Reviewed and updated as needed this visit by clinical staff   Tobacco  Allergies  Meds              Reviewed and updated as needed this visit by Provider                 Social History     Tobacco Use     Smoking status: Former     Packs/day: 0.50     Years: 10.00     Additional pack years: 0.00     Total pack years: 5.00     Types: Cigarettes     Start date: 1969     Quit date: 3/15/1978     Years since quittin.7     Smokeless tobacco: Never     Tobacco comments:     quit in his 30s   Substance Use Topics     Alcohol use: Yes     Alcohol/week: 1.0 standard drink of alcohol     Types: 1 Cans of beer per week     Comment: occ             11/15/2023     4:00 PM   Alcohol Use   Prescreen: >3 drinks/day or >7 drinks/week? No     Do you have a current opioid prescription? No  Do you use any other controlled substances or medications that are not prescribed by a provider? None    Hyperlipidemia Follow-Up    Are you regularly taking any medication or supplement to lower your cholesterol?   Yes- Atorvastatin  Are you having muscle aches or other side effects that you think could be caused by your cholesterol lowering medication?  No    Current providers sharing in care for this patient include:   Patient Care Team:  Nicola Godoy PA-C as PCP - General  (Physician Assistant)  Nicola Godoy PA-C as Assigned PCP  Javier Easton MD as Assigned Surgical Provider    The following health maintenance items are reviewed in Epic and correct as of today:  Health Maintenance   Topic Date Due     RSV VACCINE (Pregnancy & 60+) (1 - 1-dose 60+ series) Never done     ZOSTER IMMUNIZATION (2 of 3) 08/17/2017     MEDICARE ANNUAL WELLNESS VISIT  11/17/2023     ANNUAL REVIEW OF HM ORDERS  11/17/2023     COLORECTAL CANCER SCREENING  02/25/2024     FALL RISK ASSESSMENT  11/17/2024     DTAP/TDAP/TD IMMUNIZATION (2 - Td or Tdap) 03/06/2025     ADVANCE CARE PLANNING  11/17/2027     LIPID  11/09/2028     HEPATITIS C SCREENING  Completed     PHQ-2 (once per calendar year)  Completed     INFLUENZA VACCINE  Completed     Pneumococcal Vaccine: 65+ Years  Completed     AORTIC ANEURYSM SCREENING (SYSTEM ASSIGNED)  Completed     COVID-19 Vaccine  Completed     IPV IMMUNIZATION  Aged Out     HPV IMMUNIZATION  Aged Out     MENINGITIS IMMUNIZATION  Aged Out     RSV MONOCLONAL ANTIBODY  Aged Out     Lab work is in process  Labs reviewed in EPIC  BP Readings from Last 3 Encounters:   11/17/23 130/78   04/05/23 129/80   04/03/23 (!) 145/84    Wt Readings from Last 3 Encounters:   11/17/23 74.6 kg (164 lb 8 oz)   04/03/23 77.1 kg (170 lb)   01/20/23 76.7 kg (169 lb)                  Patient Active Problem List   Diagnosis     Impacted cerumen     Groin mass     ED (erectile dysfunction)     Erectile dysfunction due to diseases classified elsewhere     Cholelithiasis with acute cholecystitis without biliary obstruction     Hyperlipidemia LDL goal <130     Disturbance of skin sensation     [place]-neck deformity of finger     Sinus bradycardia     Benign essential hypertension with target blood pressure below 140/90     Skin lesion - right anterior chest at the clavicle.     Right shoulder strain, initial encounter     Chronic pain of left knee     Prediabetes     Hip pain, left     Past Surgical History:    Procedure Laterality Date     ARTHRODESIS FINGER(S) Right 2019    Procedure: Right index finger fusion;  Surgeon: EMILIANA Aguilar MD;  Location: [facility] OR     CARPAL TUNNEL RELEASE RT/LT  2006    right hand     COLONOSCOPY  2009    Normal-f/u 10 years (At [facility])     COLONOSCOPY N/A 2019    Procedure: COMBINED COLONOSCOPY, SINGLE POLYPECTOMY BY BIOPSY;  Surgeon: Brendan Nunes MD;  Location: [facility]     LAPAROSCOPIC CHOLECYSTECTOMY N/A 10/5/2016    Procedure: LAPAROSCOPIC CHOLECYSTECTOMY;  Surgeon: Nikita Anderson MD;  Location: [facility] OR     LAPAROSCOPIC HERNIORRHAPHY INGUINAL BILATERAL  1/10/2014    Procedure: LAPAROSCOPIC HERNIORRHAPHY INGUINAL BILATERAL;  Laparoscopic Bilateral Inguinal Hernia Repair;  Surgeon: Nikita Anderson MD;  Location: [facility] OR       Social History     Tobacco Use     Smoking status: Former     Packs/day: 0.50     Years: 10.00     Additional pack years: 0.00     Total pack years: 5.00     Types: Cigarettes     Start date: 1969     Quit date: 3/15/1978     Years since quittin.7     Smokeless tobacco: Never     Tobacco comments:     quit in his 30s   Substance Use Topics     Alcohol use: Yes     Alcohol/week: 1.0 standard drink of alcohol     Types: 1 Cans of beer per week     Comment: occ     Family History   Problem Relation Age of Onset     Diabetes Mother      Arthritis Mother      Diabetes Brother      Diabetes Brother          Current Outpatient Medications   Medication Sig Dispense Refill     atorvastatin (LIPITOR) 20 MG tablet Take 0.5 tablets (10 mg) by mouth daily 90 tablet 1     sildenafil (VIAGRA) 100 MG tablet Take 1 tablet (100 mg) by mouth daily as needed (erectile dysfunction) 30 tablet 1     tamsulosin (FLOMAX) 0.4 MG capsule Take 1 capsule (0.4 mg) by mouth daily 90 capsule 3     No Known Allergies  Recent Labs   Lab Test 23  0817 23  0837 11/10/22  0827 21  0758 20  1045 19  0804   A1C 5.7*  --   --   --   --   --  "   LDL 43 42 25   < > 25 49   HDL 70 68 67   < > 80 77   TRIG 77 91 89   < > 84 78   ALT 28 27 44   < > 30 30   CR 1.12 1.09 1.02   < > 0.96 1.16   GFRESTIMATED 69 71 77   < > 79 63   GFRESTBLACK  --   --   --   --  >90 73   POTASSIUM 4.0 4.1 4.2   < > 4.0 4.0   TSH  --   --   --   --  1.76  --     < > = values in this interval not displayed.        Review of Systems   Constitutional:  Negative for chills and fever.   HENT:  Positive for hearing loss. Negative for congestion, ear pain and sore throat.    Eyes:  Negative for pain and visual disturbance.   Respiratory:  Negative for cough and shortness of breath.    Cardiovascular:  Negative for chest pain, palpitations and peripheral edema.   Gastrointestinal:  Negative for abdominal pain, constipation, diarrhea, heartburn, hematochezia and nausea.   Genitourinary:  Positive for impotence. Negative for dysuria, frequency, genital sores, hematuria, penile discharge and urgency.   Musculoskeletal:  Positive for arthralgias. Negative for joint swelling and myalgias.   Skin:  Negative for rash.   Neurological:  Negative for dizziness, weakness, headaches and paresthesias.   Psychiatric/Behavioral:  Negative for mood changes. The patient is not nervous/anxious.        OBJECTIVE:   /78   Pulse 60   Temp 97.2  F (36.2  C) (Temporal)   Resp 20   Ht 1.61 m (5' 3.39\")   Wt 74.6 kg (164 lb 8 oz)   SpO2 97%   BMI 28.79 kg/m   Estimated body mass index is 28.79 kg/m  as calculated from the following:    Height as of this encounter: 1.61 m (5' 3.39\").    Weight as of this encounter: 74.6 kg (164 lb 8 oz).  Physical Exam  GENERAL: healthy, alert and no distress  EYES: Eyes grossly normal to inspection, PERRL and conjunctivae and sclerae normal  HENT: ear canals and TM's normal, nose and mouth without ulcers or lesions  NECK: no adenopathy, no asymmetry, masses, or scars and thyroid normal to palpation  RESP: lungs clear to auscultation - no rales, rhonchi or " wheezes  CV: regular rate and rhythm, normal S1 S2, no S3 or S4, no murmur, click or rub, no peripheral edema and peripheral pulses strong  ABDOMEN: soft, nontender, no hepatosplenomegaly, no masses and bowel sounds normal  MS: no gross musculoskeletal defects noted, no edema  SKIN: no suspicious lesions or rashes  NEURO: Normal strength and tone, mentation intact and speech normal  PSYCH: mentation appears normal, affect normal/bright    Diagnostic Test Results:  Labs reviewed in Epic  No results found for any visits on 11/17/23.    ASSESSMENT / PLAN:   Kameron was seen today for physical.    Diagnoses and all orders for this visit:    Routine history and physical examination of adult    Encounter for Medicare annual wellness exam    Hyperlipidemia LDL goal <130  -     atorvastatin (LIPITOR) 20 MG tablet; Take 0.5 tablets (10 mg) by mouth daily    Right shoulder strain, initial encounter  -     XR Shoulder Right G/E 3 Views; Future  -     Physical Therapy Referral; Future    Hip pain, left  -     XR Pelvis and Hip Bilateral 2 Views; Future  -     Physical Therapy Referral; Future    Chronic pain of left knee  -     XR Knee Standing Left 2 Views; Future  -     Physical Therapy Referral; Future    Prediabetes    Need for shingles vaccine    Need for vaccination against respiratory syncytial virus    Screening for prostate cancer    Nocturnal enuresis  -     tamsulosin (FLOMAX) 0.4 MG capsule; Take 1 capsule (0.4 mg) by mouth daily    Hearing deficit, bilateral  -     Adult Audiology  Referral; Future    Other orders  -     REVIEW OF HEALTH MAINTENANCE PROTOCOL ORDERS  -     PRIMARY CARE FOLLOW-UP SCHEDULING; Future      73-year-old male here for routine physical/Medicare well exam.  Follow-up in 1 year.    LDL goal established.  Labs show that he is doing well.  Continue with current medication.  Follow-up in 1 year.    Patient has had some right shoulder strain from pulling some weeds and I would advise  "physical therapy.  X-ray pending.    Patient has had some left hip and knee pain.  Think that this is arthritis.  X-rays pending.  Follow-up with physical therapy with respect to this as well.    He does have prediabetes and lifestyle modifications are discussed.  Follow-up in 6 months encouraged.    Patient has had respiratory syncytial virus and shingles vaccines and is due for repeat shingles.  No new concerns with respect to this.  Follow-up with me as needed.    Screening for prostate has been done recently.  Normal values noted though he still has nocturnal urination.  Advised trial of Flomax and observe.    He has noted that his hearing has decreased.  Would like to see audiology and a referral was placed.    Patient has been advised of split billing requirements and indicates understanding: Yes      COUNSELING:  Reviewed preventive health counseling, as reflected in patient instructions       Consider AAA screening for ages 65-75 and smoking history       Regular exercise       Healthy diet/nutrition       Vision screening       Hearing screening       Dental care       Bladder control       Fall risk prevention      BMI:   Estimated body mass index is 28.79 kg/m  as calculated from the following:    Height as of this encounter: 1.61 m (5' 3.39\").    Weight as of this encounter: 74.6 kg (164 lb 8 oz).   Weight management plan: Discussed healthy diet and exercise guidelines      He reports that he quit smoking about 45 years ago. His smoking use included cigarettes. He started smoking about 54 years ago. He has a 5.00 pack-year smoking history. He has never used smokeless tobacco.      Appropriate preventive services were discussed with this patient, including applicable screening as appropriate for fall prevention, nutrition, physical activity, Tobacco-use cessation, weight loss and cognition.  Checklist reviewing preventive services available has been given to the patient.    Reviewed patients plan of care " and provided an AVS. The Basic Care Plan (routine screening as documented in Health Maintenance) for Kameron meets the Care Plan requirement. This Care Plan has been established and reviewed with the Patient.        Nicola Godoy PA-C  [facility]    Identified Health Risks:  I have reviewed Opioid Use Disorder and Substance Use Disorder risk factors and made any needed referrals.

## 2023-11-17 NOTE — PROGRESS NOTES
"The patient was provided with written information regarding signs of hearing loss.  Answers submitted by the patient for this visit:  Annual Preventive Visit (Submitted on 11/15/2023)  Chief Complaint: Annual Exam:  In general, how would you rate your overall physical health?: good  Frequency of exercise:: 4-5 days/week  Do you usually eat at least 4 servings of fruit and vegetables a day, include whole grains & fiber, and avoid regularly eating high fat or \"junk\" foods? : Yes  Taking medications regularly:: Yes  Medication side effects:: None  Activities of Daily Living: no assistance needed  Home safety: no safety concerns identified  Hearing Impairment:: difficulty following a conversation in a noisy restaurant or crowded room, feel that people are mumbling or not speaking clearly, need to ask people to speak up or repeat themselves, find that men's voices are easier to understand than woman's, difficulty understanding soft or whispered speech  In the past 6 months, have you been bothered by leaking of urine?: No  abdominal pain: No  Blood in stool: No  Blood in urine: No  chest pain: No  chills: No  congestion: No  constipation: No  cough: No  diarrhea: No  dizziness: No  ear pain: No  eye pain: No  nervous/anxious: No  fever: No  frequency: No  genital sores: No  headaches: No  hearing loss: Yes  heartburn: No  arthralgias: Yes  joint swelling: No  peripheral edema: No  mood changes: No  myalgias: No  nausea: No  dysuria: No  palpitations: No  Skin sensation changes: No  sore throat: No  urgency: No  rash: No  shortness of breath: No  visual disturbance: No  weakness: No  impotence: Yes  penile discharge: No  In general, how would you rate your overall mental or emotional health?: excellent  Additional concerns today:: No  Exercise outside of work (Submitted on 11/15/2023)  Chief Complaint: Annual Exam:  Duration of exercise:: 30-45 minutes    "

## 2023-11-17 NOTE — PATIENT INSTRUCTIONS
Patient Education   Personalized Prevention Plan  You are due for the preventive services outlined below.  Your care team is available to assist you in scheduling these services.  If you have already completed any of these items, please share that information with your care team to update in your medical record.  Health Maintenance Due   Topic Date Due     RSV VACCINE (Pregnancy & 60+) (1 - 1-dose 60+ series) Never done     Zoster (Shingles) Vaccine (2 of 3) 08/17/2017     Annual Wellness Visit  11/17/2023     ANNUAL REVIEW OF HM ORDERS  11/17/2023     Hearing Loss: Care Instructions  Overview     Hearing loss is a sudden or slow decrease in how well you hear. It can range from slight to profound. Permanent hearing loss can occur with aging. It also can happen when you are exposed long-term to loud noise. Examples include listening to loud music, riding motorcycles, or being around other loud machines.  Hearing loss can affect your work and home life. It can make you feel lonely or depressed. You may feel that you have lost your independence. But hearing aids and other devices can help you hear better and feel connected to others.  Follow-up care is a key part of your treatment and safety. Be sure to make and go to all appointments, and call your doctor if you are having problems. It's also a good idea to know your test results and keep a list of the medicines you take.  How can you care for yourself at home?  Avoid loud noises whenever possible. This helps keep your hearing from getting worse.  Always wear hearing protection around loud noises.  Wear a hearing aid as directed.  A professional can help you pick a hearing aid that will work best for you.  You can also get hearing aids over the counter for mild to moderate hearing loss.  Have hearing tests as your doctor suggests. They can show whether your hearing has changed. Your hearing aid may need to be adjusted.  Use other devices as needed. These may  "include:  Telephone amplifiers and hearing aids that can connect to a television, stereo, radio, or microphone.  Devices that use lights or vibrations. These alert you to the doorbell, a ringing telephone, or a baby monitor.  Television closed-captioning. This shows the words at the bottom of the screen. Most new TVs can do this.  TTY (text telephone). This lets you type messages back and forth on the telephone instead of talking or listening. These devices are also called TDD. When messages are typed on the keyboard, they are sent over the phone line to a receiving TTY. The message is shown on a monitor.  Use text messaging, social media, and email if it is hard for you to communicate by telephone.  Try to learn a listening technique called speechreading. It is not lipreading. You pay attention to people's gestures, expressions, posture, and tone of voice. These clues can help you understand what a person is saying. Face the person you are talking to, and have them face you. Make sure the lighting is good. You need to see the other person's face clearly.  Think about counseling if you need help to adjust to your hearing loss.  When should you call for help?  Watch closely for changes in your health, and be sure to contact your doctor if:    You think your hearing is getting worse.     You have new symptoms, such as dizziness or nausea.   Where can you learn more?  Go to https://www.[employer].net/patiented  Enter R798 in the search box to learn more about \"Hearing Loss: Care Instructions.\"  Current as of: February 28, 2023               Content Version: 13.8    5891-6016 [employer].   Care instructions adapted under license by your healthcare professional. If you have questions about a medical condition or this instruction, always ask your healthcare professional. [employer] disclaims any warranty or liability for your use of this information.         "

## 2023-11-27 ASSESSMENT — ACTIVITIES OF DAILY LIVING (ADL)
GETTING INTO AND OUT OF AN AVERAGE CAR: SLIGHT DIFFICULTY
HOW_WOULD_YOU_RATE_YOUR_CURRENT_LEVEL_OF_FUNCTION_DURING_YOUR_USUAL_ACTIVITIES_OF_DAILY_LIVING_FROM_0_TO_100_WITH_100_BEING_YOUR_LEVEL_OF_FUNCTION_PRIOR_TO_YOUR_HIP_PROBLEM_AND_0_BEING_THE_INABILITY_TO_PERFORM_ANY_OF_YOUR_USUAL_DAILY_ACTIVITIES?: 90
GOING UP 1 FLIGHT OF STAIRS: SLIGHT DIFFICULTY
WALKING_DOWN_STEEP_HILLS: SLIGHT DIFFICULTY
REACHING_FOR_SOMETHING_ON_A_HIGH_SHELF: 3
LANDING: SLIGHT DIFFICULTY
JUMPING: SLIGHT DIFFICULTY
DEEP_SQUATTING: SLIGHT DIFFICULTY
RECREATIONAL_ACTIVITIES: MODERATE DIFFICULTY
KNEE_ACTIVITY_OF_DAILY_LIVING_SCORE: 95.71
HOW_WOULD_YOU_RATE_YOUR_CURRENT_LEVEL_OF_FUNCTION_DURING_YOUR_SPORTS_RELATED_ACTIVITIES_FROM_0_TO_100_WITH_100_BEING_YOUR_LEVEL_OF_FUNCTION_PRIOR_TO_YOUR_HIP_PROBLEM_AND_0_BEING_THE_INABILITY_TO_PERFORM_ANY_OF_YOUR_USUAL_DAILY_ACTIVITIES?: 80
WALKING_APPROXIMATELY_10_MINUTES: NO DIFFICULTY AT ALL
KNEEL ON THE FRONT OF YOUR KNEE: ACTIVITY IS MINIMALLY DIFFICULT
PUTTING_ON_YOUR_PANTS: 1
WALKING_FOR_APPROXIMATELY_10_MINUTES: NO DIFFICULTY AT ALL
HOW_WOULD_YOU_RATE_YOUR_CURRENT_LEVEL_OF_FUNCTION?: NEARLY NORMAL
SITTING_FOR_15_MINUTES: NO DIFFICULTY AT ALL
SITTING FOR 15 MINUTES: NO DIFFICULTY AT ALL
TWISTING/PIVOTING_ON_INVOLVED_LEG: SLIGHT DIFFICULTY
REMOVING_SOMETHING_FROM_YOUR_BACK_POCKET: 1
ADL_COUNT: 17
SPORTS_COUNT: 9
HOW_WOULD_YOU_RATE_THE_OVERALL_FUNCTION_OF_YOUR_KNEE_DURING_YOUR_USUAL_DAILY_ACTIVITIES?: NORMAL
SPORTS_HIGHEST_POTENTIAL_SCORE: 36
RUNNING_ONE_MILE: SLIGHT DIFFICULTY
WALKING_15_MINUTES_OR_GREATER: NO DIFFICULTY AT ALL
LIGHT_TO_MODERATE_WORK: SLIGHT DIFFICULTY
STIFFNESS: I DO NOT HAVE THE SYMPTOM
SWELLING: I DO NOT HAVE THE SYMPTOM
RAW_SCORE: 67
GOING_DOWN_1_FLIGHT_OF_STAIRS: NO DIFFICULTY AT ALL
GIVING WAY, BUCKLING OR SHIFTING OF KNEE: I DO NOT HAVE THE SYMPTOM
GO UP STAIRS: ACTIVITY IS NOT DIFFICULT
RECREATIONAL ACTIVITIES: MODERATE DIFFICULTY
STANDING_FOR_15_MINUTES: NO DIFFICULTY AT ALL
WHEN_LYING_ON_THE_INVOLVED_SIDE: 3
STEPPING_UP_AND_DOWN_CURBS: NO DIFFICULTY AT ALL
SQUAT: ACTIVITY IS MINIMALLY DIFFICULT
LIMPING: I DO NOT HAVE THE SYMPTOM
SWELLING: I DO NOT HAVE THE SYMPTOM
LIGHT_TO_MODERATE_WORK: SLIGHT DIFFICULTY
HOS_ADL_ITEM_SCORE_TOTAL: 51
WALKING_UP_STEEP_HILLS: SLIGHT DIFFICULTY
PLEASE_INDICATE_YOR_PRIMARY_REASON_FOR_REFERRAL_TO_THERAPY:: HIP
SIT WITH YOUR KNEE BENT: ACTIVITY IS NOT DIFFICULT
SPORTS_TOTAL_ITEM_SCORE: 0
PLEASE_INDICATE_YOR_PRIMARY_REASON_FOR_REFERRAL_TO_THERAPY:: KNEE
WALKING_UP_STEEP_HILLS: SLIGHT DIFFICULTY
STAND: ACTIVITY IS NOT DIFFICULT
PAIN: I HAVE THE SYMPTOM BUT IT DOES NOT AFFECT MY ACTIVITY
STEPPING UP AND DOWN CURBS: NO DIFFICULTY AT ALL
CUTTING/LATERAL_MOVEMENTS: MODERATE DIFFICULTY
ROLLING OVER IN BED: SLIGHT DIFFICULTY
STAND: ACTIVITY IS NOT DIFFICULT
PUTTING ON SOCKS AND SHOES: NO DIFFICULTY AT ALL
WEAKNESS: I DO NOT HAVE THE SYMPTOM
AS_A_RESULT_OF_YOUR_KNEE_INJURY,_HOW_WOULD_YOU_RATE_YOUR_CURRENT_LEVEL_OF_DAILY_ACTIVITY?: NORMAL
LIMPING: I DO NOT HAVE THE SYMPTOM
GOING_UP_1_FLIGHT_OF_STAIRS: SLIGHT DIFFICULTY
HEAVY_WORK: MODERATE DIFFICULTY
WALKING_15_MINUTES_OR_GREATER: NO DIFFICULTY AT ALL
GIVING WAY, BUCKLING OR SHIFTING OF KNEE: I DO NOT HAVE THE SYMPTOM
AT_ITS_WORST?: 3
SQUAT: ACTIVITY IS MINIMALLY DIFFICULT
GO DOWN STAIRS: ACTIVITY IS NOT DIFFICULT
SWINGING_OBJECTS_LIKE_A_GOLF_CLUB: SLIGHT DIFFICULTY
GETTING_INTO_AND_OUT_OF_AN_AVERAGE_CAR: SLIGHT DIFFICULTY
TOUCHING_THE_BACK_OF_YOUR_NECK: 1
ADL_SCORE(%): 0
SIT WITH YOUR KNEE BENT: ACTIVITY IS NOT DIFFICULT
CARRYING_A_HEAVY_OBJECT_OF_10_POUNDS: 1
KNEEL ON THE FRONT OF YOUR KNEE: ACTIVITY IS MINIMALLY DIFFICULT
WALKING_INITIALLY: SLIGHT DIFFICULTY
ABILITY_TO_PERFORM_ACTIVITY_WITH_YOUR_NORMAL_TECHNIQUE: SLIGHT DIFFICULTY
PUTTING_ON_SOCKS_AND_SHOES: NO DIFFICULTY AT ALL
GO DOWN STAIRS: ACTIVITY IS NOT DIFFICULT
LOW_IMPACT_ACTIVITIES_LIKE_FAST_WALKING: SLIGHT DIFFICULTY
ADL_HIGHEST_POTENTIAL_SCORE: 68
WASHING_YOUR_HAIR?: 0
WALKING_DOWN_STEEP_HILLS: SLIGHT DIFFICULTY
SPORTS_SCORE(%): 0
PLEASE_INDICATE_YOR_PRIMARY_REASON_FOR_REFERRAL_TO_THERAPY:: SHOULDER
GOING DOWN 1 FLIGHT OF STAIRS: NO DIFFICULTY AT ALL
WEAKNESS: I DO NOT HAVE THE SYMPTOM
PUSHING_WITH_THE_INVOLVED_ARM: 2
TWISTING/PIVOTING ON INVOLVED LEG: SLIGHT DIFFICULTY
WALK: ACTIVITY IS NOT DIFFICULT
ROLLING_OVER_IN_BED: SLIGHT DIFFICULTY
STARTING_AND_STOPPING_QUICKLY: SLIGHT DIFFICULTY
PLACING_AN_OBJECT_ON_A_HIGH_SHELF: 3
DEEP SQUATTING: SLIGHT DIFFICULTY
RISE FROM A CHAIR: ACTIVITY IS NOT DIFFICULT
WALK: ACTIVITY IS NOT DIFFICULT
PUTTING_ON_AN_UNDERSHIRT_OR_A_PULLOVER_SWEATER: 2
KNEE_ACTIVITY_OF_DAILY_LIVING_SUM: 67
HOS_ADL_SCORE(%): 79.69
GO UP STAIRS: ACTIVITY IS NOT DIFFICULT
STANDING FOR 15 MINUTES: NO DIFFICULTY AT ALL
HOW_WOULD_YOU_RATE_THE_CURRENT_FUNCTION_OF_YOUR_KNEE_DURING_YOUR_USUAL_DAILY_ACTIVITIES_ON_A_SCALE_FROM_0_TO_100_WITH_100_BEING_YOUR_LEVEL_OF_KNEE_FUNCTION_PRIOR_TO_YOUR_INJURY_AND_0_BEING_THE_INABILITY_TO_PERFORM_ANY_OF_YOUR_USUAL_DAILY_ACTIVITIES?: 100
HOW_WOULD_YOU_RATE_YOUR_CURRENT_LEVEL_OF_FUNCTION_DURING_YOUR_USUAL_ACTIVITIES_OF_DAILY_LIVING_FROM_0_TO_100_WITH_100_BEING_YOUR_LEVEL_OF_FUNCTION_PRIOR_TO_YOUR_HIP_PROBLEM_AND_0_BEING_THE_INABILITY_TO_PERFORM_ANY_OF_YOUR_USUAL_DAILY_ACTIVITIES?: 90
ADL_TOTAL_ITEM_SCORE: 0
HOS_ADL_HIGHEST_POTENTIAL_SCORE: 64
RISE FROM A CHAIR: ACTIVITY IS NOT DIFFICULT
STIFFNESS: I DO NOT HAVE THE SYMPTOM
AS_A_RESULT_OF_YOUR_KNEE_INJURY,_HOW_WOULD_YOU_RATE_YOUR_CURRENT_LEVEL_OF_DAILY_ACTIVITY?: NORMAL
HEAVY_WORK: MODERATE DIFFICULTY
PUTTING_ON_A_SHIRT_THAT_BUTTONS_DOWN_THE_FRONT: 0
WASHING_YOUR_BACK: 3
HOW_WOULD_YOU_RATE_THE_OVERALL_FUNCTION_OF_YOUR_KNEE_DURING_YOUR_USUAL_DAILY_ACTIVITIES?: NORMAL
ABILITY_TO_PARTICIPATE_IN_YOUR_DESIRED_SPORT_AS_LONG_AS_YOU_WOULD_LIKE: SLIGHT DIFFICULTY
HOW_WOULD_YOU_RATE_THE_CURRENT_FUNCTION_OF_YOUR_KNEE_DURING_YOUR_USUAL_DAILY_ACTIVITIES_ON_A_SCALE_FROM_0_TO_100_WITH_100_BEING_YOUR_LEVEL_OF_KNEE_FUNCTION_PRIOR_TO_YOUR_INJURY_AND_0_BEING_THE_INABILITY_TO_PERFORM_ANY_OF_YOUR_USUAL_DAILY_ACTIVITIES?: 100
WALKING_INITIALLY: SLIGHT DIFFICULTY
PAIN: I HAVE THE SYMPTOM BUT IT DOES NOT AFFECT MY ACTIVITY

## 2023-11-30 ENCOUNTER — THERAPY VISIT (OUTPATIENT)
Dept: PHYSICAL THERAPY | Facility: CLINIC | Age: 73
End: 2023-11-30
Attending: PHYSICIAN ASSISTANT
Payer: COMMERCIAL

## 2023-11-30 DIAGNOSIS — M25.552 HIP PAIN, LEFT: ICD-10-CM

## 2023-11-30 DIAGNOSIS — M25.562 CHRONIC PAIN OF LEFT KNEE: ICD-10-CM

## 2023-11-30 DIAGNOSIS — G89.29 CHRONIC PAIN OF LEFT KNEE: ICD-10-CM

## 2023-11-30 DIAGNOSIS — S46.911A RIGHT SHOULDER STRAIN, INITIAL ENCOUNTER: ICD-10-CM

## 2023-11-30 PROCEDURE — 97110 THERAPEUTIC EXERCISES: CPT | Mod: GP | Performed by: PHYSICAL THERAPIST

## 2023-11-30 PROCEDURE — 97161 PT EVAL LOW COMPLEX 20 MIN: CPT | Mod: GP | Performed by: PHYSICAL THERAPIST

## 2023-11-30 NOTE — PROGRESS NOTES
PHYSICAL THERAPY EVALUATION  Type of Visit: Evaluation    See electronic medical record for Abuse and Falls Screening details.    Subjective   Pt reports onset of R shoulder pain about 2 months ago.  Pt reports he will get sore after repetitive use.  Pt reports while weeding he felt a sharp pain in his shoulder.  Pt reports pain will come and go.  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  Pt reports pain is on the side of his shoulder.  Pt reports pain in shoulder is 0/10 at rest but will increase in pain with reaching compression on shoulder.  Pt reports L hip and knee are more chronic, has had X-rays revealing arthritis in both areas.  Pt reports pain can come on with steps and prolonged walking.  Pt reports pressure on the hip when lying down can be painful to him.  Pt reports shifting weight side to side can increase pain in L hip.  Pt reports having an old injury to his L knee that has stayed persistent and limits kneeling.        Presenting condition or subjective complaint: sore shoulder and hip  Date of onset: 09/01/23    Relevant medical history: Arthritis; Hearing problems   Dates & types of surgery: gall bladder, hernia mesh, finger fusion,    Prior diagnostic imaging/testing results: X-ray     Prior therapy history for the same diagnosis, illness or injury: No      Living Environment  Social support: With a significant other or spouse   Type of home: House; 1 level; Basement   Stairs to enter the home: Yes 3 Is there a railing: Yes   Ramp: No   Stairs inside the home: Yes 15 Is there a railing: Yes   Help at home: None  Equipment owned:       Employment: No    Hobbies/Interests: family history, readings, daily exercise,    Patient goals for therapy: get rid of the pain    Pain assessment: Pain present     Objective   KNEE EVALUATION  PAIN: Pain Level at Rest: 0/10  Pain Level with Use: 3/10  Pain Location: hip and knee  Pain Quality: Aching  Pain Frequency: intermittent  Pain is Worst: activity  dependent  Pain is Exacerbated By: prolonged sitting, getting up and down from the ground, stairs.  Pain is Relieved By: rest  Pain Progression: Unchanged  INTEGUMENTARY (edema, incisions): WNL  GAIT:   Weightbearing Status: WBAT  Assistive Device(s): None  Gait Deviations: WNL  BALANCE/PROPRIOCEPTION: WNL  WEIGHTBEARING ALIGNMENT: WNL  ROM: AROM WNL  STRENGTH: WNL  FLEXIBILITY: WNL  SPECIAL TESTS: WNL  FUNCTIONAL TESTS: Double Leg Squat: Anterior knee translation, Knee valgus, Hip internal rotation, and Improper use of glutes/hips  PALPATION: WNL  JOINT MOBILITY: WNL    SHOULDER EVALUATION  PAIN: Pain Level at Rest: 0/10  Pain Level with Use: 3/10  Pain Location: shoulder  Pain Quality: Aching  Pain Frequency: intermittent  Pain is Worst: daytime  Pain is Exacerbated By: reaching, lifting.  Pain is Relieved By: rest  Pain Progression: Unchanged  INTEGUMENTARY (edema, incisions): WNL  POSTURE: WNL  ROM:   (Degrees) Left AROM Left PROM Right AROM  Right PROM   Shoulder Flexion   170    Shoulder Extension       Shoulder Abduction   170    Shoulder Adduction       Shoulder Internal Rotation   T10    Shoulder External Rotation   65    Shoulder Horizontal Abduction       Shoulder Horizontal Adduction       Shoulder Flexion ER       Shoulder Flexion IR       Elbow Extension       Elbow Flexion       Pain:   End feel:   STRENGTH: R shoulder: flexion: 5/5, abduction: 4+/5, ER: 4/5, all others 5/5.  FLEXIBILITY: WNL  SPECIAL TESTS:  Positive Hawkin's Adrien, AC crossover, Neers.  PALPATION: WNL  JOINT MOBILITY: WNL  CERVICAL SCREEN: WNL    Assessment & Plan   CLINICAL IMPRESSIONS  Medical Diagnosis: R shoulder strain, L hip pain, Chronic L knee pain    Treatment Diagnosis: R shoulder strain, L hip pain, Chronic L knee pain   Impression/Assessment: Patient is a 73 year old male with R shoulder, L hip, and L knee pain  complaints.  The following significant findings have been identified: Pain, Decreased ROM/flexibility,  Decreased joint mobility, Decreased strength, and Impaired balance. These impairments interfere with their ability to perform recreational activities and household chores as compared to previous level of function.     Clinical Decision Making (Complexity):  Clinical Presentation: Stable/Uncomplicated  Clinical Presentation Rationale: based on medical and personal factors listed in PT evaluation  Clinical Decision Making (Complexity): Low complexity    PLAN OF CARE  Treatment Interventions:  Modalities: Cryotherapy  Interventions: Gait Training, Manual Therapy, Neuromuscular Re-education, Therapeutic Activity, Therapeutic Exercise    Long Term Goals     PT Goal 1  Goal Identifier: HEP  Goal Description: Pt will be independent with HEP in order to improve LE strength and motor control  Target Date: 12/28/23  PT Goal 2  Goal Identifier: Shoulder AROM  Goal Description: Pt will demonstrated full AROM of R shoulder with no increase in pain in order to improve tolerance to reaching activities at home.  Target Date: 12/28/23      Frequency of Treatment: 1x/week  Duration of Treatment: 12 weeks    Recommended Referrals to Other Professionals: None.  Education Assessment:   Learner/Method: Patient  Education Comments: HEP    Risks and benefits of evaluation/treatment have been explained.   Patient/Family/caregiver agrees with Plan of Care.     Evaluation Time:     PT Eval, Low Complexity Minutes (88552): 20     Signing Clinician: Lev Muñiz PT      [facility]                                                                                   OUTPATIENT PHYSICAL THERAPY      PLAN OF TREATMENT FOR OUTPATIENT REHABILITATION   Patient's Last Name, First Name, Kameron Nelson YOB: 1950   Provider's Name   [facility]   Medical Record No.  [id number]     Onset Date: 09/01/23  Start of Care Date: 11/30/23     Medical Diagnosis:  R shoulder  strain, L hip pain, Chronic L knee pain      PT Treatment Diagnosis:  R shoulder strain, L hip pain, Chronic L knee pain Plan of Treatment  Frequency/Duration: 1x/week/ 12 weeks    Certification date from 11/30/23 to 02/22/24         See note for plan of treatment details and functional goals     Lev Muñiz, PT                         I CERTIFY THE NEED FOR THESE SERVICES FURNISHED UNDER        THIS PLAN OF TREATMENT AND WHILE UNDER MY CARE .             Physician Signature               Date    X_____________________________________________________                  Referring Provider:  Nicola Andrews    Initial Assessment  See Epic Evaluation- Start of Care Date: 11/30/23

## 2023-12-07 ENCOUNTER — THERAPY VISIT (OUTPATIENT)
Dept: PHYSICAL THERAPY | Facility: CLINIC | Age: 73
End: 2023-12-07
Attending: PHYSICIAN ASSISTANT
Payer: COMMERCIAL

## 2023-12-07 DIAGNOSIS — G89.29 CHRONIC PAIN OF LEFT KNEE: ICD-10-CM

## 2023-12-07 DIAGNOSIS — M25.552 HIP PAIN, LEFT: ICD-10-CM

## 2023-12-07 DIAGNOSIS — S46.911A RIGHT SHOULDER STRAIN, INITIAL ENCOUNTER: Primary | ICD-10-CM

## 2023-12-07 DIAGNOSIS — M25.562 CHRONIC PAIN OF LEFT KNEE: ICD-10-CM

## 2023-12-07 PROCEDURE — 97110 THERAPEUTIC EXERCISES: CPT | Mod: GP | Performed by: PHYSICAL THERAPIST

## 2023-12-13 ENCOUNTER — THERAPY VISIT (OUTPATIENT)
Dept: PHYSICAL THERAPY | Facility: CLINIC | Age: 73
End: 2023-12-13
Attending: PHYSICIAN ASSISTANT
Payer: COMMERCIAL

## 2023-12-13 DIAGNOSIS — M25.562 CHRONIC PAIN OF LEFT KNEE: ICD-10-CM

## 2023-12-13 DIAGNOSIS — M25.552 HIP PAIN, LEFT: ICD-10-CM

## 2023-12-13 DIAGNOSIS — G89.29 CHRONIC PAIN OF LEFT KNEE: ICD-10-CM

## 2023-12-13 DIAGNOSIS — S46.911A RIGHT SHOULDER STRAIN, INITIAL ENCOUNTER: Primary | ICD-10-CM

## 2023-12-13 PROCEDURE — 97110 THERAPEUTIC EXERCISES: CPT | Mod: GP | Performed by: PHYSICAL THERAPIST

## 2024-01-17 NOTE — PROGRESS NOTES
12/13/23 0500   Appointment Info   Signing clinician's name / credentials Lev Muñiz, PT, DPT, OCS   Visits Used 3/10   Medical Diagnosis R shoulder strain, L hip pain, Chronic L knee pain   PT Tx Diagnosis R shoulder strain, L hip pain, Chronic L knee pain   Progress Note/Certification   Start of Care Date 11/30/23   Onset of illness/injury or Date of Surgery 09/01/23   Therapy Frequency 1x/week   Predicted Duration 12 weeks   Certification date from 11/30/23   Certification date to 02/22/24   Progress Note Completed Date 11/30/23   GOALS   PT Goals 2   PT Goal 1   Goal Identifier HEP   Goal Description Pt will be independent with HEP in order to improve LE strength and motor control   Target Date 12/28/23   Date Met 12/13/23   PT Goal 2   Goal Identifier Shoulder AROM   Goal Description Pt will demonstrated full AROM of R shoulder with no increase in pain in order to improve tolerance to reaching activities at home.   Target Date 12/28/23   Date Met 12/13/23   Subjective Report   Subjective Report Pt denies pain in R shoulder this date.  Pt reports pain is very mild in L hip and knee and much better than his baseline.   Treatment Interventions (PT)   Interventions Therapeutic Procedure/Exercise   Therapeutic Procedure/Exercise   Therapeutic Procedures: strength, endurance, ROM, flexibillity minutes (85383) 25   Ther Proc 1 - Details Reviewed squats and lunges with good understanding.  Reviewed Wand flexion, abduction, and ER x 10 each.  Added 1# to AROM Shoulder flexion x 20, shoulder abduction x 20 all to shoulder height.  T band rows GTB x 20. Added T band ER GTB x 20.   Skilled Intervention Exercise instruction   Patient Response/Progress Demonstrated understanding.   Education   Learner/Method Patient   Education Comments HEP   Plan   Home program squats, lunges, wand ex.   Updates to plan of care 1x/week   Plan for next session 30 day hold.   Total Session Time   Timed Code Treatment Minutes 25   Total  Treatment Time (sum of timed and untimed services) 25         DISCHARGE  Reason for Discharge: Patient has met all goals.    Equipment Issued: none    Discharge Plan: Patient to continue home program.    Referring Provider:  Nicola Andrews

## 2024-01-24 ENCOUNTER — MYC REFILL (OUTPATIENT)
Dept: FAMILY MEDICINE | Facility: OTHER | Age: 74
End: 2024-01-24
Payer: COMMERCIAL

## 2024-01-24 DIAGNOSIS — N39.44 NOCTURNAL ENURESIS: ICD-10-CM

## 2024-01-24 RX ORDER — TAMSULOSIN HYDROCHLORIDE 0.4 MG/1
0.4 CAPSULE ORAL DAILY
Qty: 90 CAPSULE | Refills: 3 | Status: CANCELLED | OUTPATIENT
Start: 2024-01-24

## 2024-01-24 NOTE — TELEPHONE ENCOUNTER
Okay to provide pharmacy with a verbal to fill tamsulosin on 2/1/24? There are refills on file for medication.     Viktoria TUTTLEN, RN  [facility]

## 2024-01-24 NOTE — TELEPHONE ENCOUNTER
Called and spoke with pharmacy. There is already a request going through for early vacation fill and will approved.     Updated patient via my chart.     Viktoria TUTTLEN, RN  [facility]

## 2024-01-30 ENCOUNTER — OFFICE VISIT (OUTPATIENT)
Dept: AUDIOLOGY | Facility: CLINIC | Age: 74
End: 2024-01-30
Payer: COMMERCIAL

## 2024-01-30 DIAGNOSIS — H90.3 SENSORINEURAL HEARING LOSS, BILATERAL: Primary | ICD-10-CM

## 2024-01-30 DIAGNOSIS — H91.93 HEARING DEFICIT, BILATERAL: ICD-10-CM

## 2024-01-30 PROCEDURE — 92550 TYMPANOMETRY & REFLEX THRESH: CPT | Performed by: AUDIOLOGIST

## 2024-01-30 PROCEDURE — 92557 COMPREHENSIVE HEARING TEST: CPT | Performed by: AUDIOLOGIST

## 2024-01-30 NOTE — Clinical Note
Thanks for sending Kameron for his hearing test, he has mild to moderate hearing loss and would benefit from hearing aids. We discussed his options.

## 2024-01-30 NOTE — PROGRESS NOTES
AUDIOLOGY REPORT    SUBJECTIVE:  Kameron Whitley is a 73 year old male who was seen in the Audiology Clinic at the [facility] for audiologic evaluation, referred by Nicola Andrews PA-C for audiology evaluation for gradual decrease in hearing over the last 6 years. Kameron notes difficulty hearing in group situations or in background noise. He also notes he needs more volume on the TV than his wife prefers. He denied tinnitus. 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 He denied dizziness, vertigo, and any falls in the last year. He was treated for BPPV last year and has not had any more episodes. They were unaccompanied today.    OBJECTIVE:  Abuse Screening:  Do you feel unsafe at home or work/school? No  Do you feel threatened by someone? No  Does anyone try to keep you from having contact with others, or doing things outside of your home? No  Physical signs of abuse present? No     Fall Risk Screen:  1. Have you fallen two or more times in the past year? No  2. Have you fallen and had an injury in the past year? No    Timed Up and Go Score (in seconds): not tested  Is patient a fall risk? No  Referral initiated: No  Fall Risk Assessment Completed by Audiology    Otoscopic exam indicates ears are clear of cerumen bilaterally     Pure Tone Thresholds assessed using conventional audiometry with good reliability from 250-8000 Hz bilaterally using insert earphones     RIGHT:  borderline-normal sloping to moderate sensorineural hearing loss    LEFT:    borderline-normal sloping to moderate sensorineural hearing loss    Tympanogram:    RIGHT: normal eardrum mobility    LEFT:   normal eardrum mobility    Reflexes (reported by stimulus ear):  RIGHT: Ipsilateral is present at normal levels  RIGHT: Contralateral is present at normal levels  LEFT:   Ipsilateral is present at normal levels  LEFT:   Contralateral is present at normal levels    Speech Reception Threshold:     RIGHT: 30 dB HL    LEFT:   30 dB HL    Word Recognition Score:     RIGHT: 90% at 70 dB HL using NU-6 recorded 50-word list.    LEFT:   92% at 70 dB HL using NU-6 recorded 50-word list.      ASSESSMENT:     ICD-10-CM    1. Sensorineural hearing loss, bilateral  H90.3 Cmpn Audiometry Thrshld Eval & Speech Recog (97993)     Tymps / Reflex   (34695)      2. Hearing deficit, bilateral  H91.93 Adult Audiology  Referral        Today s results were discussed with the patient in detail.     PLAN:    Patient was counseled regarding hearing loss and impact on communication. Patient is a good candidate for amplification at this time. Handout on good communication strategies, and hearing aid use was given to patient. Recommended hearing aid consultation, based on degree of hearing loss and reported functional communication problems. Please call this clinic with questions regarding these results or recommendations.        Lisset Mcadams Licensed Audiologist #[id number]

## 2024-03-15 ENCOUNTER — MYC MEDICAL ADVICE (OUTPATIENT)
Dept: FAMILY MEDICINE | Facility: OTHER | Age: 74
End: 2024-03-15
Payer: COMMERCIAL

## 2024-03-15 ENCOUNTER — PATIENT OUTREACH (OUTPATIENT)
Dept: GASTROENTEROLOGY | Facility: CLINIC | Age: 74
End: 2024-03-15
Payer: COMMERCIAL

## 2024-03-15 DIAGNOSIS — Z12.11 SPECIAL SCREENING FOR MALIGNANT NEOPLASMS, COLON: Primary | ICD-10-CM

## 2024-03-15 NOTE — PROGRESS NOTES
"CRC Screening Colonoscopy Referral Review    Patient meets the inclusion criteria for screening colonoscopy standing order.    Ordering/Referring Provider:  Nicola Godoy    BMI: Estimated body mass index is 28.79 kg/m  as calculated from the following:    Height as of 11/17/23: 1.61 m (5' 3.39\").    Weight as of 11/17/23: 74.6 kg (164 lb 8 oz).     Sedation:  Does patient have any of the following conditions affecting sedation?  No medical conditions affecting sedation.    Previous Scopes:  Any previous recommendations or follow up needs based on previous scope?  na / No recommendations.    Medical Concerns to Postpone Order:  Does patient have any of the following medical concerns that should postpone/delay colonoscopy referral?  No medical conditions affecting colonoscopy referral.    Final Referral Details:  Based on patient's medical history patient is appropriate for referral order with moderate sedation. If patient's BMI > 50 do not schedule in [facility].  "

## 2024-03-19 ENCOUNTER — TELEPHONE (OUTPATIENT)
Dept: GASTROENTEROLOGY | Facility: CLINIC | Age: 74
End: 2024-03-19
Payer: COMMERCIAL

## 2024-03-19 NOTE — TELEPHONE ENCOUNTER
"Endoscopy Scheduling Screen    Have you had a positive Covid test in the last 14 days?  No    What is your communication preference for Instructions and/or Bowel Prep?   MyChart    What insurance is in the chart?  Other:  [employer]     Ordering/Referring Provider: BETO VELAZQUEZ   (If ordering provider performs procedure, schedule with ordering provider unless otherwise instructed. )    BMI: Estimated body mass index is 28.79 kg/m  as calculated from the following:    Height as of 11/17/23: 1.61 m (5' 3.39\").    Weight as of 11/17/23: 74.6 kg (164 lb 8 oz).     Sedation Ordered  moderate sedation.   If patient BMI > 50 do not schedule in [facility].    If patient BMI > 45 do not schedule at [facility].    Are you taking methadone or Suboxone?  No    Have you had difficulties, pain, or discomfort during past endoscopy procedures?  No    Are you taking any prescription medications for pain 3 or more times per week?   NO, No RN review required.    Do you have a history of malignant hyperthermia?  No    (Females) Are you currently pregnant?   No     Have you been diagnosed or told you have pulmonary hypertension?   No    Do you have an LVAD?  No    Have you been told you have moderate to severe sleep apnea?  No    Have you been told you have COPD, asthma, or any other lung disease?  No    Do you have any heart conditions?  No     Have you ever had or are you waiting for an organ transplant?  No. Continue scheduling, no site restrictions.    Have you had a stroke or transient ischemic attack (TIA aka \"mini stroke\" in the last 6 months?   No    Have you been diagnosed with or been told you have cirrhosis of the liver?   No    Are you currently on dialysis?   No    Do you need assistance transferring?   No    BMI: Estimated body mass index is 28.79 kg/m  as calculated from the following:    Height as of 11/17/23: 1.61 m (5' 3.39\").    Weight as of 11/17/23: 74.6 kg (164 lb 8 oz).     Is patients BMI > 40 and scheduling location " [facility]?  No    Do you take an injectable medication for weight loss or diabetes (excluding insulin)?  No    Do you take the medication Naltrexone?  No    Do you take blood thinners?  No       Prep   Are you currently on dialysis or do you have chronic kidney disease?  No    Do you have a diagnosis of diabetes?  No    Do you have a diagnosis of cystic fibrosis (CF)?  No    On a regular basis do you go 3 -5 days between bowel movements?  No    BMI > 40?  No    Preferred Pharmacy:    [facility] [address] [place], [place] - [address]  [address]  [facility] [address]  Phone: [phone] Fax: [phone]      Final Scheduling Details     Procedure scheduled  Colonoscopy    Surgeon:  CHRIS     Date of procedure:  4/18/24     Pre-OP / PAC:   No - Not required for this site.    Location  [facility] - Per order.    Sedation   MAC/Deep Sedation  Per location.      Patient Reminders:   You will receive a call from a Nurse to review instructions and health history.  This assessment must be completed prior to your procedure.  Failure to complete the Nurse assessment may result in the procedure being cancelled.      On the day of your procedure, please designate an adult(s) who can drive you home stay with you for the next 24 hours. The medicines used in the exam will make you sleepy. You will not be able to drive.      You cannot take public transportation, ride share services, or non-medical taxi service without a responsible caregiver.  Medical transport services are allowed with the requirement that a responsible caregiver will receive you at your destination.  We require that drivers and caregivers are confirmed prior to your procedure.

## 2024-04-15 ENCOUNTER — OFFICE VISIT (OUTPATIENT)
Dept: DERMATOLOGY | Facility: CLINIC | Age: 74
End: 2024-04-15
Payer: COMMERCIAL

## 2024-04-15 DIAGNOSIS — L57.0 ACTINIC KERATOSIS: ICD-10-CM

## 2024-04-15 DIAGNOSIS — Z85.89 HX OF SQUAMOUS CELL CARCINOMA: ICD-10-CM

## 2024-04-15 DIAGNOSIS — L82.1 SEBORRHEIC KERATOSES: ICD-10-CM

## 2024-04-15 DIAGNOSIS — L81.4 SOLAR LENTIGO: ICD-10-CM

## 2024-04-15 DIAGNOSIS — D18.01 CHERRY ANGIOMA: ICD-10-CM

## 2024-04-15 DIAGNOSIS — L21.9 DERMATITIS, SEBORRHEIC: Primary | ICD-10-CM

## 2024-04-15 DIAGNOSIS — D17.0 LIPOMA OF NECK: ICD-10-CM

## 2024-04-15 PROCEDURE — 17000 DESTRUCT PREMALG LESION: CPT | Performed by: DERMATOLOGY

## 2024-04-15 PROCEDURE — 99213 OFFICE O/P EST LOW 20 MIN: CPT | Mod: 25 | Performed by: DERMATOLOGY

## 2024-04-15 PROCEDURE — 17003 DESTRUCT PREMALG LES 2-14: CPT | Performed by: DERMATOLOGY

## 2024-04-15 RX ORDER — FLUOCINOLONE ACETONIDE 0.11 MG/ML
OIL TOPICAL AT BEDTIME
Qty: 120 ML | Refills: 11 | Status: SHIPPED | OUTPATIENT
Start: 2024-04-15 | End: 2024-06-24

## 2024-04-15 NOTE — LETTER
4/15/2024         RE: Kameron Whitley  [address] [place] [address]        Dear Colleague,    Thank you for referring your patient, Kameron Whitley, to the [facility]. Please see a copy of my visit note below.    [facility] Dermatology Note  Encounter Date: Apr 15, 2024    Dermatology Problem List:  1. 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 Scalp irritation  - current tx: OTC salicylic acid shampoo, Fluocinolone 0.01% oil  3. AK   - s/p cryotherapy    ______________________________________    Impression/Plan:  1. History of nonmelanoma skin cancer  - no evidence of recurrence today    2. Reassurance provided for benign lesions not treated today including cherry angiomata, solar lentigines, seborrheic keratoses, lipoma, and banal-appearing melanocytic nevi.     3. Seborrheic dermatitis; active on scalp  - continue OTC moisturizers and anti-dandruff shampoos  - start Fluocinolone 0.01% oil    4. Lipoma, symptomatic on R posterior neck  - referral to derm surg for removal    5. AK x2, cheeks  - cryotherapy performed today, see note below    Procedures Performed  Cryotherapy procedure note: After verbal consent and discussion of risks and benefits including but no limited to dyspigmentation/scar, blister, and pain, 2 AK was(were) treated with 1-2mm freeze border for 2 cycles with liquid nitrogen. Post cryotherapy instructions were provided.         Follow-up 6 month(s) in-person, or earlier for new or changing lesions      Staff Involved:  Scribe Disclosure:   By signing my name below, I, Dolores Hernesto, attest that this documentation has been prepared under the direction and in the presence of Jean Marie Green MD.  - Electronically Signed: Dolores Eric 04/15/24       Provider Disclosure:   The documentation recorded by the scribe accurately reflects the services I personally performed and the decisions made by me.    Jean Marie Green,  MD   of Dermatology  Department of Dermatology  [facility] [employer] [facility]            CC:   Skin Check (FBSE, no areas of concern.  HX of SCC and AK. )    History of Present Illness:  Mr. Kameron Whitley is a 73 year old male who presents as a return patient. He is here today for a FBSE and was last seen by me on 10/30/2023.    Patient denies any areas of concern today, but reports dryness on the back of his scalp which he has been using OTC shampoos. He reports hx of deer tick bite but managed to remove it quickly. He had another approximately two days later that he reports did not attach.    Labs:  N/A    Physical exam:  Vitals: There were no vitals taken for this visit.  GEN: This is a well developed, well-nourished male in no acute distress, in a pleasant mood.    SKIN: [place] phototype II  - Full skin, which includes the head/face, both arms, chest, back, abdomen,both legs, genitalia and/or groin buttocks, digits and/or nails, was examined.  - There are 2 erythematous macules with overyling adherent scale on the cheeks.   - There are dome shaped bright red papules on the head/neck, trunk, extremities.   - Multiple regular brown pigmented macules and papules are identified on the head/neck, trunk, extremities.   - Scattered brown macules on sun exposed areas.  - There are waxy stuck on tan to brown papules on the head/neck, trunk, extremities.  - soft subcutaneous nodule on R posterior neck  - erythematous scaly patch on crown of scalp  - No other lesions of concern on areas examined.       Past Medical History:   Past Medical History:   Diagnosis Date     Arthritis      Closed fracture of clavicle     Age 2 - Right Clavicle     Past Surgical History:   Procedure Laterality Date     ARTHRODESIS FINGER(S) Right 2/8/2019    Procedure: Right index finger fusion;  Surgeon: EMILIANA Aguilar MD;  Location: [facility] OR     CARPAL TUNNEL RELEASE RT/LT  1/2006    right hand      COLONOSCOPY  4/2009    Normal-f/u 10 years (At [facility])     COLONOSCOPY N/A 2/25/2019    Procedure: COMBINED COLONOSCOPY, SINGLE POLYPECTOMY BY BIOPSY;  Surgeon: Brendan Nunes MD;  Location:  GI     LAPAROSCOPIC CHOLECYSTECTOMY N/A 10/5/2016    Procedure: LAPAROSCOPIC CHOLECYSTECTOMY;  Surgeon: Nikita Anderson MD;  Location: [facility] OR     LAPAROSCOPIC HERNIORRHAPHY INGUINAL BILATERAL  1/10/2014    Procedure: LAPAROSCOPIC HERNIORRHAPHY INGUINAL BILATERAL;  Laparoscopic Bilateral Inguinal Hernia Repair;  Surgeon: Nikita Anderson MD;  Location:  OR       Social History:   reports that he quit smoking about 46 years ago. His smoking use included cigarettes. He started smoking about 55 years ago. He has a 5 pack-year smoking history. He has never used smokeless tobacco. He reports current alcohol use of about 1.0 standard drink of alcohol per week. He reports that he does not use drugs.    Family History:  Family History   Problem Relation Age of Onset     Diabetes Mother      Arthritis Mother      Diabetes Brother      Diabetes Brother        Medications:  Current Outpatient Medications   Medication Sig Dispense Refill     atorvastatin (LIPITOR) 20 MG tablet Take 0.5 tablets (10 mg) by mouth daily 90 tablet 1     sildenafil (VIAGRA) 100 MG tablet Take 1 tablet (100 mg) by mouth daily as needed (erectile dysfunction) 30 tablet 1     tamsulosin (FLOMAX) 0.4 MG capsule Take 1 capsule (0.4 mg) by mouth daily 90 capsule 3     No Known Allergies                Again, thank you for allowing me to participate in the care of your patient.        Sincerely,        Jean Marie Green MD

## 2024-04-15 NOTE — NURSING NOTE
Kameron Whtiley's goals for this visit include:   Chief Complaint   Patient presents with    Skin Check     FBSE, no areas of concern.  HX of SCC and AK.       He requests these members of his care team be copied on today's visit information:     PCP: Nicola Godoy    Referring Provider:  No referring provider defined for this encounter.    There were no vitals taken for this visit.    Do you need any medication refills at today's visit?     Rosey Cox on 4/15/2024 at 9:34 AM

## 2024-04-15 NOTE — PATIENT INSTRUCTIONS
Checking for Skin Cancer  You can help find cancer early by checking your skin each month. There are 3 main kinds of skin cancer: melanoma, basal cell carcinoma, and squamous cell carcinoma. Doing monthly skin checks is the best way to find new marks, sores, or skin changes. Follow these instructions for checking your skin.   The ABCDEs of checking moles for melanoma   Check your moles or growths for signs of melanoma using ABCDE:   Asymmetry: The sides of the mole or growth don t match.  Border: The edges are ragged, notched, or blurred.  Color: The color within the mole or growth varies. It could be black, brown, tan, white, or shades of red, gray, or blue.  Diameter: The mole or growth is larger than   inch or 6 mm (size of a pencil eraser).  Evolving: The size, shape, texture, or color of the mole or growth is changing.     ABCDE's of moles on light skin.        ABCDE's of moles on dark skin may be harder to identify.     Checking for other types of skin cancer  Basal cell carcinoma or squamous cell carcinoma cause symptoms like:     A spot or mole that looks different from all other marks on your skin  Changes in how an area feels, such as itching, tenderness, or pain  Changes in the skin's surface, such as oozing, bleeding, or scaliness  A sore that doesn't heal  New swelling, redness, or spread of color beyond the border of a mole    Who s at risk?  Anyone of any skin color can get skin cancer. But you're at greater risk if you have:   Fair skin that freckles easily and burns instead of tanning  Light-colored or red hair  Light-colored eyes  Many moles or abnormal moles on your skin  A long history of unprotected exposure to sunlight or tanning beds  A history of many blistering sunburns as a child or teen  A family history of skin cancer  Been exposed to radiation or chemicals  A weakened immune system  Been exposed to arsenic  If you've had skin cancer in the past, you're at high risk of having it again.    How to check your skin  Do your monthly skin checkups in front of a full-length mirror. Use a room with good lighting so it's easier to see. Use a hand mirror to look at hard-to-see places like your buttocks and back. You can also have a trusted friend or family member help you with these checks. Check every part of your body, including your:   Head (ears, face, neck, and scalp)  Torso (front, back, sides, and under breasts)  Arms (tops, undersides, and armpits)  Hands (palms, backs, and fingers, including under the nails)  Lower back, buttocks, and genitals  Legs (front, back, and sides)  Feet (tops, soles, toes, including under the nails, and between toes)  Watch for new spots on your skin or a spot that's changing in color, shape, size.   If you have a lot of moles, take digital photos of them each month. Make sure to take photos both up close and from a distance. These can help you see if any moles change over time.   Know your skin  Most skin changes aren't cancer. But if you see any changes in your skin, call your healthcare provider right away. Only they can tell you if a change is a problem. If you have skin cancer, seeing your provider can be the first step to getting the treatment that could save your life.   Al last reviewed this educational content on 10/1/2021    7342-5061 [employer]. All rights reserved. This information is not intended as a substitute for professional medical care. Always follow your healthcare professional's instructions.     Cryotherapy    What is it?  Use of a very cold liquid, such as liquid nitrogen, to freeze and destroy abnormal skin cells that need to be removed    What should I expect?  Tenderness and redness  A small blister that might grow and fill with dark purple blood. There may be crusting.  More than one treatment may be needed if the lesions do not go away.    How do I care for the treated area?  Gently wash the area with your hands when  bathing.  Use a thin layer of Vaseline to help with healing. You may use a Band-Aid.   The area should heal within 7-10 days and may leave behind a pink or lighter color.   Do not use an antibiotic or Neosporin ointment.   You may take acetaminophen (Tylenol) for pain.     Call your doctor if you have:  Severe pain  Signs of infection (warmth, redness, cloudy yellow drainage, and or a bad smell)  Questions or concerns    Who should I call with questions?      [facility]: [phone]      [facility]: [phone]      For urgent needs outside of business hours call the [facility] at [phone] and ask for the dermatology resident on call

## 2024-04-15 NOTE — PROGRESS NOTES
[facility] Dermatology Note  Encounter Date: Apr 15, 2024    Dermatology Problem List:  1. 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 Scalp irritation  - current tx: OTC salicylic acid shampoo, Fluocinolone 0.01% oil  3. AK   - s/p cryotherapy    ______________________________________    Impression/Plan:  1. History of nonmelanoma skin cancer  - no evidence of recurrence today    2. Reassurance provided for benign lesions not treated today including cherry angiomata, solar lentigines, seborrheic keratoses, lipoma, and banal-appearing melanocytic nevi.     3. Seborrheic dermatitis; active on scalp  - continue OTC moisturizers and anti-dandruff shampoos  - start Fluocinolone 0.01% oil    4. Lipoma, symptomatic on R posterior neck  - referral to derm surg for removal    5. AK x2, cheeks  - cryotherapy performed today, see note below    Procedures Performed  Cryotherapy procedure note: After verbal consent and discussion of risks and benefits including but no limited to dyspigmentation/scar, blister, and pain, 2 AK was(were) treated with 1-2mm freeze border for 2 cycles with liquid nitrogen. Post cryotherapy instructions were provided.         Follow-up 6 month(s) in-person, or earlier for new or changing lesions      Staff Involved:  Scribe Disclosure:   By signing my name below, I, Dolores Chambersfranny, attest that this documentation has been prepared under the direction and in the presence of Jean Marie Green MD.  - Electronically Signed: Dolores Eric 04/15/24       Provider Disclosure:   The documentation recorded by the scribe accurately reflects the services I personally performed and the decisions made by me.    Jean Marie Green MD   of Dermatology  Department of Dermatology  [facility]            CC:   Skin Check (FBSE, no areas of concern.  HX of SCC and AK. )    History of Present Illness:  Mr. Kameron Whitley is a 73 year old  male who presents as a return patient. He is here today for a FBSE and was last seen by me on 10/30/2023.    Patient denies any areas of concern today, but reports dryness on the back of his scalp which he has been using OTC shampoos. He reports hx of deer tick bite but managed to remove it quickly. He had another approximately two days later that he reports did not attach.    Labs:  N/A    Physical exam:  Vitals: There were no vitals taken for this visit.  GEN: This is a well developed, well-nourished male in no acute distress, in a pleasant mood.    SKIN: [place] phototype II  - Full skin, which includes the head/face, both arms, chest, back, abdomen,both legs, genitalia and/or groin buttocks, digits and/or nails, was examined.  - There are 2 erythematous macules with overyling adherent scale on the cheeks.   - There are dome shaped bright red papules on the head/neck, trunk, extremities.   - Multiple regular brown pigmented macules and papules are identified on the head/neck, trunk, extremities.   - Scattered brown macules on sun exposed areas.  - There are waxy stuck on tan to brown papules on the head/neck, trunk, extremities.  - soft subcutaneous nodule on R posterior neck  - erythematous scaly patch on crown of scalp  - No other lesions of concern on areas examined.       Past Medical History:   Past Medical History:   Diagnosis Date    Arthritis     Closed fracture of clavicle     Age 2 - Right Clavicle     Past Surgical History:   Procedure Laterality Date    ARTHRODESIS FINGER(S) Right 2/8/2019    Procedure: Right index finger fusion;  Surgeon: EMILIANA Aguilar MD;  Location: [facility] OR    CARPAL TUNNEL RELEASE RT/LT  1/2006    right hand    COLONOSCOPY  4/2009    Normal-f/u 10 years (At [facility])    COLONOSCOPY N/A 2/25/2019    Procedure: COMBINED COLONOSCOPY, SINGLE POLYPECTOMY BY BIOPSY;  Surgeon: Brendan Nunes MD;  Location:  GI    LAPAROSCOPIC CHOLECYSTECTOMY N/A 10/5/2016    Procedure:  LAPAROSCOPIC CHOLECYSTECTOMY;  Surgeon: Nikita Anderson MD;  Location: [facility] OR    LAPAROSCOPIC HERNIORRHAPHY INGUINAL BILATERAL  1/10/2014    Procedure: LAPAROSCOPIC HERNIORRHAPHY INGUINAL BILATERAL;  Laparoscopic Bilateral Inguinal Hernia Repair;  Surgeon: Nikita Anderson MD;  Location:  OR       Social History:   reports that he quit smoking about 46 years ago. His smoking use included cigarettes. He started smoking about 55 years ago. He has a 5 pack-year smoking history. He has never used smokeless tobacco. He reports current alcohol use of about 1.0 standard drink of alcohol per week. He reports that he does not use drugs.    Family History:  Family History   Problem Relation Age of Onset    Diabetes Mother     Arthritis Mother     Diabetes Brother     Diabetes Brother        Medications:  Current Outpatient Medications   Medication Sig Dispense Refill    atorvastatin (LIPITOR) 20 MG tablet Take 0.5 tablets (10 mg) by mouth daily 90 tablet 1    sildenafil (VIAGRA) 100 MG tablet Take 1 tablet (100 mg) by mouth daily as needed (erectile dysfunction) 30 tablet 1    tamsulosin (FLOMAX) 0.4 MG capsule Take 1 capsule (0.4 mg) by mouth daily 90 capsule 3     No Known Allergies

## 2024-04-16 ENCOUNTER — TELEPHONE (OUTPATIENT)
Dept: DERMATOLOGY | Facility: CLINIC | Age: 74
End: 2024-04-16
Payer: COMMERCIAL

## 2024-04-16 NOTE — TELEPHONE ENCOUNTER
Called patient to schedule surgery with Dr. Easton    Date of Surgery: 06/24    Surgery type: excision     Consult scheduled: Yes    Has patient had mohs with us before? No    Additional comments: pt will be in [place] from 04/23-06/01 [place] on 4/16/2024 at 11:13 AM

## 2024-04-18 ENCOUNTER — ANESTHESIA EVENT (OUTPATIENT)
Dept: GASTROENTEROLOGY | Facility: CLINIC | Age: 74
End: 2024-04-18
Payer: COMMERCIAL

## 2024-04-18 ENCOUNTER — HOSPITAL ENCOUNTER (OUTPATIENT)
Facility: CLINIC | Age: 74
Discharge: HOME | End: 2024-04-18
Attending: SURGERY | Admitting: SURGERY
Payer: COMMERCIAL

## 2024-04-18 ENCOUNTER — ANESTHESIA (OUTPATIENT)
Dept: GASTROENTEROLOGY | Facility: CLINIC | Age: 74
End: 2024-04-18
Payer: COMMERCIAL

## 2024-04-18 VITALS
OXYGEN SATURATION: 94 % | TEMPERATURE: 97.8 F | BODY MASS INDEX: 28.79 KG/M2 | DIASTOLIC BLOOD PRESSURE: 91 MMHG | SYSTOLIC BLOOD PRESSURE: 122 MMHG | HEART RATE: 68 BPM | WEIGHT: 164.5 LBS | RESPIRATION RATE: 16 BRPM

## 2024-04-18 PROBLEM — K64.9 HEMORRHOIDS, UNSPECIFIED HEMORRHOID TYPE: Status: ACTIVE | Noted: 2024-04-18

## 2024-04-18 PROBLEM — K57.30 DIVERTICULOSIS OF LARGE INTESTINE: Status: ACTIVE | Noted: 2024-04-18

## 2024-04-18 LAB — COLONOSCOPY: NORMAL

## 2024-04-18 PROCEDURE — 250N000011 HC RX IP 250 OP 636: Performed by: NURSE ANESTHETIST, CERTIFIED REGISTERED

## 2024-04-18 PROCEDURE — 258N000003 HC RX IP 258 OP 636: Performed by: NURSE ANESTHETIST, CERTIFIED REGISTERED

## 2024-04-18 PROCEDURE — G0105 COLORECTAL SCRN; HI RISK IND: HCPCS | Performed by: SURGERY

## 2024-04-18 PROCEDURE — 370N000017 HC ANESTHESIA TECHNICAL FEE, PER MIN: Performed by: SURGERY

## 2024-04-18 PROCEDURE — 45378 DIAGNOSTIC COLONOSCOPY: CPT | Performed by: SURGERY

## 2024-04-18 PROCEDURE — 250N000009 HC RX 250: Performed by: NURSE ANESTHETIST, CERTIFIED REGISTERED

## 2024-04-18 RX ORDER — FLUMAZENIL 0.1 MG/ML
0.2 INJECTION, SOLUTION INTRAVENOUS
Status: DISCONTINUED | OUTPATIENT
Start: 2024-04-18 | End: 2024-04-18 | Stop reason: HOSPADM

## 2024-04-18 RX ORDER — LIDOCAINE HYDROCHLORIDE 20 MG/ML
INJECTION, SOLUTION INFILTRATION; PERINEURAL PRN
Status: DISCONTINUED | OUTPATIENT
Start: 2024-04-18 | End: 2024-04-18

## 2024-04-18 RX ORDER — SODIUM CHLORIDE, SODIUM LACTATE, POTASSIUM CHLORIDE, CALCIUM CHLORIDE 600; 310; 30; 20 MG/100ML; MG/100ML; MG/100ML; MG/100ML
INJECTION, SOLUTION INTRAVENOUS CONTINUOUS
Status: DISCONTINUED | OUTPATIENT
Start: 2024-04-18 | End: 2024-04-18 | Stop reason: HOSPADM

## 2024-04-18 RX ORDER — PROCHLORPERAZINE MALEATE 5 MG
5 TABLET ORAL EVERY 6 HOURS PRN
Status: DISCONTINUED | OUTPATIENT
Start: 2024-04-18 | End: 2024-04-18 | Stop reason: HOSPADM

## 2024-04-18 RX ORDER — LIDOCAINE 40 MG/G
CREAM TOPICAL
Status: DISCONTINUED | OUTPATIENT
Start: 2024-04-18 | End: 2024-04-18 | Stop reason: HOSPADM

## 2024-04-18 RX ORDER — PROPOFOL 10 MG/ML
INJECTION, EMULSION INTRAVENOUS PRN
Status: DISCONTINUED | OUTPATIENT
Start: 2024-04-18 | End: 2024-04-18

## 2024-04-18 RX ORDER — NALOXONE HYDROCHLORIDE 0.4 MG/ML
0.2 INJECTION, SOLUTION INTRAMUSCULAR; INTRAVENOUS; SUBCUTANEOUS
Status: DISCONTINUED | OUTPATIENT
Start: 2024-04-18 | End: 2024-04-18 | Stop reason: HOSPADM

## 2024-04-18 RX ORDER — NALOXONE HYDROCHLORIDE 0.4 MG/ML
0.4 INJECTION, SOLUTION INTRAMUSCULAR; INTRAVENOUS; SUBCUTANEOUS
Status: DISCONTINUED | OUTPATIENT
Start: 2024-04-18 | End: 2024-04-18 | Stop reason: HOSPADM

## 2024-04-18 RX ORDER — ONDANSETRON 2 MG/ML
4 INJECTION INTRAMUSCULAR; INTRAVENOUS
Status: DISCONTINUED | OUTPATIENT
Start: 2024-04-18 | End: 2024-04-18 | Stop reason: HOSPADM

## 2024-04-18 RX ORDER — ONDANSETRON 2 MG/ML
4 INJECTION INTRAMUSCULAR; INTRAVENOUS EVERY 6 HOURS PRN
Status: DISCONTINUED | OUTPATIENT
Start: 2024-04-18 | End: 2024-04-18 | Stop reason: HOSPADM

## 2024-04-18 RX ORDER — PROPOFOL 10 MG/ML
INJECTION, EMULSION INTRAVENOUS CONTINUOUS PRN
Status: DISCONTINUED | OUTPATIENT
Start: 2024-04-18 | End: 2024-04-18

## 2024-04-18 RX ORDER — ONDANSETRON 4 MG/1
4 TABLET, ORALLY DISINTEGRATING ORAL EVERY 6 HOURS PRN
Status: DISCONTINUED | OUTPATIENT
Start: 2024-04-18 | End: 2024-04-18 | Stop reason: HOSPADM

## 2024-04-18 RX ADMIN — LIDOCAINE HYDROCHLORIDE 50 MG: 20 INJECTION, SOLUTION INFILTRATION; PERINEURAL at 15:21

## 2024-04-18 RX ADMIN — SODIUM CHLORIDE, POTASSIUM CHLORIDE, SODIUM LACTATE AND CALCIUM CHLORIDE: 600; 310; 30; 20 INJECTION, SOLUTION INTRAVENOUS at 14:54

## 2024-04-18 RX ADMIN — PROPOFOL 200 MCG/KG/MIN: 10 INJECTION, EMULSION INTRAVENOUS at 15:22

## 2024-04-18 RX ADMIN — PROPOFOL 80 MG: 10 INJECTION, EMULSION INTRAVENOUS at 15:22

## 2024-04-18 ASSESSMENT — ACTIVITIES OF DAILY LIVING (ADL)
ADLS_ACUITY_SCORE: 35
ADLS_ACUITY_SCORE: 35

## 2024-04-18 ASSESSMENT — LIFESTYLE VARIABLES: TOBACCO_USE: 1

## 2024-04-18 NOTE — ANESTHESIA CARE TRANSFER NOTE
Patient: Kameron Whitley    Procedure: Procedure(s):  Colonoscopy       Diagnosis: Special screening for malignant neoplasms, colon [Z12.11]  Diagnosis Additional Information: No value filed.    Anesthesia Type:   MAC     Note:    Oropharynx: spontaneously breathing  Level of Consciousness: drowsy  Oxygen Supplementation: room air    Independent Airway: airway patency satisfactory and stable  Dentition: dentition unchanged  Vital Signs Stable: post-procedure vital signs reviewed and stable  Report to RN Given: handoff report given  Patient transferred to: Phase II    Handoff Report: Identifed the Patient, Identified the Reponsible Provider, Reviewed the pertinent medical history, Discussed the surgical course, Reviewed Intra-OP anesthesia mangement and issues during anesthesia, Set expectations for post-procedure period and Allowed opportunity for questions and acknowledgement of understanding      Vitals:  Vitals Value Taken Time   /76 04/18/24 1538   Temp     Pulse 61 04/18/24 1538   Resp     SpO2 95 % 04/18/24 1540   Vitals shown include unfiled device data.    Electronically Signed By: EMMY Phan CRNA  April 18, 2024  3:41 PM

## 2024-04-18 NOTE — ANESTHESIA POSTPROCEDURE EVALUATION
Patient: Kameron Whitley    Procedure: Procedure(s):  Colonoscopy       Anesthesia Type:  MAC    Note:  Disposition: Outpatient   Postop Pain Control: Uneventful            Sign Out: Well controlled pain   PONV: No   Neuro/Psych: Uneventful            Sign Out: Acceptable/Baseline neuro status   Airway/Respiratory: Uneventful            Sign Out: Acceptable/Baseline resp. status   CV/Hemodynamics: Uneventful            Sign Out: Acceptable CV status; No obvious hypovolemia; No obvious fluid overload   Other NRE: NONE   DID A NON-ROUTINE EVENT OCCUR? No           Last vitals:  Vitals Value Taken Time   /91 04/18/24 1600   Temp     Pulse 68 04/18/24 1600   Resp     SpO2 95 % 04/18/24 1553   Vitals shown include unfiled device data.    Electronically Signed By: EMMY Phan CRNA  April 18, 2024  4:16 PM

## 2024-04-18 NOTE — H&P
Patient seen for Endoscopy    HPI:  Patient is a 73 year old male here for endoscopy. Not taking blood thinning medications. No MI or CVA history. No issues with previous sedation. No recent acute illness.    Review Of Systems    Skin: negative  Ears/Nose/Throat: negative  Respiratory: No shortness of breath, dyspnea on exertion, cough, or hemoptysis  Cardiovascular: negative  Gastrointestinal: negative  Genitourinary: negative  Musculoskeletal: negative  Neurologic: negative  Hematologic/Lymphatic/Immunologic: negative  Endocrine: negative      Past Medical History:   Diagnosis Date    Arthritis     Closed fracture of clavicle     Age 2 - Right Clavicle       Past Surgical History:   Procedure Laterality Date    ARTHRODESIS FINGER(S) Right 2/8/2019    Procedure: Right index finger fusion;  Surgeon: EMILIANA Aguilar MD;  Location: [facility] OR    CARPAL TUNNEL RELEASE RT/LT  1/2006    right hand    COLONOSCOPY  4/2009    Normal-f/u 10 years (At [facility])    COLONOSCOPY N/A 2/25/2019    Procedure: COMBINED COLONOSCOPY, SINGLE POLYPECTOMY BY BIOPSY;  Surgeon: Brendan Nunes MD;  Location: [facility] GI    LAPAROSCOPIC CHOLECYSTECTOMY N/A 10/5/2016    Procedure: LAPAROSCOPIC CHOLECYSTECTOMY;  Surgeon: Nikita Anderson MD;  Location: [facility] OR    LAPAROSCOPIC HERNIORRHAPHY INGUINAL BILATERAL  1/10/2014    Procedure: LAPAROSCOPIC HERNIORRHAPHY INGUINAL BILATERAL;  Laparoscopic Bilateral Inguinal Hernia Repair;  Surgeon: Nikita Anderson MD;  Location: [facility] OR       Family History   Problem Relation Age of Onset    Diabetes Mother     Arthritis Mother     Diabetes Brother     Diabetes Brother        Social History     Socioeconomic History    Marital status:      Spouse name: Not on file    Number of children: Not on file    Years of education: Not on file    Highest education level: Not on file   Occupational History    Not on file   Tobacco Use    Smoking status: Former     Current packs/day: 0.00     Average packs/day:  0.5 packs/day for 10.0 years (5.0 ttl pk-yrs)     Types: Cigarettes     Start date: 1969     Quit date: 3/15/1978     Years since quittin.1    Smokeless tobacco: Never    Tobacco comments:     quit in his 30s   Vaping Use    Vaping status: Never Used   Substance and Sexual Activity    Alcohol use: Yes     Alcohol/week: 1.0 standard drink of alcohol     Types: 1 Cans of beer per week     Comment: occ    Drug use: No    Sexual activity: Yes     Partners: Female   Other Topics Concern    Parent/sibling w/ CABG, MI or angioplasty before 65F 55M? No   Social History Narrative    Not on file     Social Determinants of Health     Financial Resource Strain: Low Risk  (11/15/2023)    Financial Resource Strain     Within the past 12 months, have you or your family members you live with been unable to get utilities (heat, electricity) when it was really needed?: No   Food Insecurity: Low Risk  (11/15/2023)    Food Insecurity     Within the past 12 months, did you worry that your food would run out before you got money to buy more?: No     Within the past 12 months, did the food you bought just not last and you didn t have money to get more?: No   Transportation Needs: Low Risk  (11/15/2023)    Transportation Needs     Within the past 12 months, has lack of transportation kept you from medical appointments, getting your medicines, non-medical meetings or appointments, work, or from getting things that you need?: No   Physical Activity: Not on file   Stress: Not on file   Social Connections: Not on file   Interpersonal Safety: Low Risk  (2023)    Interpersonal Safety     Do you feel physically and emotionally safe where you currently live?: Yes     Within the past 12 months, have you been hit, slapped, kicked or otherwise physically hurt by someone?: No     Within the past 12 months, have you been humiliated or emotionally abused in other ways by your partner or ex-partner?: No   Housing Stability: Low Risk   (11/15/2023)    Housing Stability     Do you have housing? : Yes     Are you worried about losing your housing?: No       No current outpatient medications on file.       Medications and history reviewed    Physical exam:  Vitals: BP (!) 140/98   Temp 97.8  F (36.6  C) (Oral)   Resp 16   Wt 74.6 kg (164 lb 8 oz)   SpO2 96%   BMI 28.79 kg/m    BMI= Body mass index is 28.79 kg/m .    Constitutional: Healthy, alert, non-distressed   Head: Normo-cephalic, atraumatic, no lesions, masses or tenderness   Cardiovascular: RRR, no new murmurs, +S1, +S2 heart sounds, no clicks, rubs or gallops   Respiratory: CTAB, no rales, rhonchi or wheezing, equal chest rise, good respiratory effort   Gastrointestinal: Soft, non-tender, non distended, no rebound rigidity or guarding, no masses or hernias palpated   : Deferred  Musculoskeletal: Moves all extremities, normal  strength, no deformities noted   Skin: No suspicious lesions or rashes   Psychiatric: Mentation appears normal, affect appropriate   Hematologic/Lymphatic/Immunologic: Normal cervical and supraclavicular lymph nodes   Patient able to get up on table without difficulty.    Labs show:  No results found for this or any previous visit (from the past 24 hour(s)).    Assessment: Endoscopy  Plan: Pt cleared for anesthesia for proposed procedure.    Dionicio Verma DO

## 2024-04-18 NOTE — DISCHARGE INSTRUCTIONS
[facility]    Home Care Following Endoscopy          Activity:  You have just undergone an endoscopic procedure usually performed with conscious sedation.  Do not work or operate machinery (including a car) for at least 12 hours.    I encourage you to walk and attempt to pass this air as soon as possible.    Diet:  Return to the diet you were on before your procedure but eat lightly for the first 12-24 hours.  Drink plenty of water.  Resume any regular medications unless otherwise advised by your physician.  Please begin any new medication prescribed as a result of your procedure as directed by your physician.   If you had any biopsy or polyp removed please refrain from aspirin or aspirin products for 2 days.  If on Coumadin please restart as instructed by your physician.   Pain:  You may take Tylenol as needed for pain.  Expected Recovery:  You can expect some mild abdominal fullness and/or discomfort due to the air used to inflate your intestinal tract. It is also normal to have a mild sore throat after upper endoscopy.    Call Your Physician if You Have:  After Colonoscopy:  Worsening persisting abdominal pain which is worse with activity.  Fevers (>101 degrees F), chills or shakes.  Passage of continued blood with bowel movements.     Any questions or concerns about your recovery, please call [phone] or after hours [facility] ([phone]) Nurse Advice Line.    Follow-up Care:  You did have polyps/biopsy tissue sample(s) removed.  The polyps/biopsy tissue sample(s) will be sent to pathology.    You should receive letter in your My Chart from Dr. Verma with your results within 1-2 weeks. If you do not participate in My Chart a physical letter will come in the mail in 2-3 weeks.  Please call if you have not received a notification of your results.  If asked to return to clinic please make an appointment 1 week after your procedure.  Call [phone].

## 2024-04-18 NOTE — ANESTHESIA PREPROCEDURE EVALUATION
Anesthesia Pre-Procedure Evaluation    Patient: Kameron Whitley   MRN: [id number] : 1950        Procedure : Procedure(s):  Colonoscopy          Past Medical History:   Diagnosis Date    Arthritis     Closed fracture of clavicle     Age 2 - Right Clavicle      Past Surgical History:   Procedure Laterality Date    ARTHRODESIS FINGER(S) Right 2019    Procedure: Right index finger fusion;  Surgeon: EMILIANA Aguilar MD;  Location: [facility] OR    CARPAL TUNNEL RELEASE RT/LT  2006    right hand    COLONOSCOPY  2009    Normal-f/u 10 years (At [facility])    COLONOSCOPY N/A 2019    Procedure: COMBINED COLONOSCOPY, SINGLE POLYPECTOMY BY BIOPSY;  Surgeon: Brendan Nunes MD;  Location:  GI    LAPAROSCOPIC CHOLECYSTECTOMY N/A 10/5/2016    Procedure: LAPAROSCOPIC CHOLECYSTECTOMY;  Surgeon: Nikita Anderson MD;  Location: [facility] OR    LAPAROSCOPIC HERNIORRHAPHY INGUINAL BILATERAL  1/10/2014    Procedure: LAPAROSCOPIC HERNIORRHAPHY INGUINAL BILATERAL;  Laparoscopic Bilateral Inguinal Hernia Repair;  Surgeon: Nikita Anderson MD;  Location: [facility] OR      No Known Allergies   Social History     Tobacco Use    Smoking status: Former     Current packs/day: 0.00     Average packs/day: 0.5 packs/day for 10.0 years (5.0 ttl pk-yrs)     Types: Cigarettes     Start date: 1969     Quit date: 3/15/1978     Years since quittin.1    Smokeless tobacco: Never    Tobacco comments:     quit in his 30s   Substance Use Topics    Alcohol use: Yes     Alcohol/week: 1.0 standard drink of alcohol     Types: 1 Cans of beer per week     Comment: occ      Wt Readings from Last 1 Encounters:   23 74.6 kg (164 lb 8 oz)        Anesthesia Evaluation   Pt has had prior anesthetic. Type: General and MAC.    No history of anesthetic complications       ROS/MED HX  ENT/Pulmonary:     (+)                tobacco use, Past use,                       Neurologic:  - neg neurologic ROS     Cardiovascular:     (+) Dyslipidemia  "hypertension- -   -  - -                                 Previous cardiac testing   Echo: Date: Results:    Stress Test:  Date: Results:    ECG Reviewed:  Date: 1-15-19 Results:  NSR  Cath:  Date: Results:      METS/Exercise Tolerance:     Hematologic:  - neg hematologic  ROS     Musculoskeletal: Comment: Chronic pain left knee and hip      GI/Hepatic: Comment: Previous Katelyn    (+)        bowel prep,         (-) GERD   Renal/Genitourinary:  - neg Renal ROS     Endo:  - neg endo ROS     Psychiatric/Substance Use:  - neg psychiatric ROS     Infectious Disease:  - neg infectious disease ROS     Malignancy:  - neg malignancy ROS     Other:  - neg other ROS          Physical Exam    Airway        Mallampati: II   TM distance: > 3 FB   Neck ROM: full   Mouth opening: > 3 cm    Respiratory Devices and Support         Dental       (+) Minor Abnormalities - some fillings, tiny chips      Cardiovascular   cardiovascular exam normal       Rhythm and rate: regular and normal     Pulmonary   pulmonary exam normal        breath sounds clear to auscultation         OUTSIDE LABS:  CBC:   Lab Results   Component Value Date    WBC 7.1 11/06/2020    WBC 5.6 01/15/2019    HGB 15.7 11/09/2023    HGB 15.9 11/10/2022    HCT 44.7 11/06/2020    HCT 45.1 01/15/2019     11/06/2020     01/15/2019     BMP:   Lab Results   Component Value Date     11/09/2023     01/26/2023    POTASSIUM 4.0 11/09/2023    POTASSIUM 4.1 01/26/2023    CHLORIDE 103 11/09/2023    CHLORIDE 103 01/26/2023    CO2 28 11/09/2023    CO2 28 01/26/2023    BUN 17.3 11/09/2023    BUN 16.3 01/26/2023    CR 1.12 11/09/2023    CR 1.09 01/26/2023     (H) 11/09/2023     (H) 01/26/2023     COAGS: No results found for: \"PTT\", \"INR\", \"FIBR\"  POC: No results found for: \"BGM\", \"HCG\", \"HCGS\"  HEPATIC:   Lab Results   Component Value Date    ALBUMIN 4.2 11/09/2023    PROTTOTAL 7.0 11/09/2023    ALT 28 11/09/2023    AST 30 11/09/2023    ALKPHOS 77 " 11/09/2023    BILITOTAL 0.8 11/09/2023     OTHER:   Lab Results   Component Value Date    A1C 5.7 (H) 11/09/2023    AISLINN 9.4 11/09/2023    LIPASE 90 10/04/2016    TSH 1.76 11/06/2020       Anesthesia Plan    ASA Status:  2    NPO Status:  NPO Appropriate    Anesthesia Type: MAC.     - Reason for MAC: straight local not clinically adequate              Consents    Anesthesia Plan(s) and associated risks, benefits, and realistic alternatives discussed. Questions answered and patient/representative(s) expressed understanding.     - Discussed:     - Discussed with:  Patient      - Extended Intubation/Ventilatory Support Discussed: No.      - Patient is DNR/DNI Status: No     Use of blood products discussed: No .     Postoperative Care       PONV prophylaxis: Background Propofol Infusion     Comments:

## 2024-05-24 ENCOUNTER — TELEPHONE (OUTPATIENT)
Dept: DERMATOLOGY | Facility: CLINIC | Age: 74
End: 2024-05-24
Payer: COMMERCIAL

## 2024-05-24 NOTE — TELEPHONE ENCOUNTER
I left a message for patient to call [facility].    6/10/24 phone consult for lipoma removal is not needed unless patient would like it.  Ok to proceed with excision without a consult.  Please complete excision checklist.  Sarita Che RN

## 2024-05-28 NOTE — TELEPHONE ENCOUNTER
2nd attempt to reach pt, no answer, LVM for him to return our call at [phone].    Elva Brar RN on 5/28/2024 at 10:24 AM

## 2024-05-30 NOTE — TELEPHONE ENCOUNTER
3rd attempt to reach patient. Left a VM to call the clinic regarding his upcoming consult appointment.     Angelina Pineda RN on 5/30/2024 at 8:23 AM

## 2024-05-31 NOTE — TELEPHONE ENCOUNTER
PT returning call, understanding from  that does not need phone visit on Jun 10th and has a procedure on Jun 24th. Would like a call back to confirm.    Chip Poe on 5/31/2024 at 10:21 AM

## 2024-06-13 ENCOUNTER — TELEPHONE (OUTPATIENT)
Dept: FAMILY MEDICINE | Facility: OTHER | Age: 74
End: 2024-06-13
Payer: COMMERCIAL

## 2024-06-13 NOTE — TELEPHONE ENCOUNTER
Reason for Call:  Appointment Request    Patient requesting this type of appt:  office appointment    Requested provider: Nicola Godoy    Reason patient unable to be scheduled: Not within requested timeframe    When does patient want to be seen/preferred time: 3-7 days    Comments: may have something in his right ear, thinks it a hearing aid chip piece that's missing from his hearing aid. Wants it to be checked out. Patient is heading out of town on June 30th.      Could we send this information to you in [employer] or would you prefer to receive a phone call?:   Patient would prefer a phone call   Okay to leave a detailed message?: Yes at Cell number on file:    Telephone Information:   Mobile [phone]       Call taken on 6/13/2024 at 4:43 PM by Trish Dejesus

## 2024-06-17 ENCOUNTER — OFFICE VISIT (OUTPATIENT)
Dept: FAMILY MEDICINE | Facility: OTHER | Age: 74
End: 2024-06-17
Payer: COMMERCIAL

## 2024-06-17 VITALS
HEART RATE: 62 BPM | TEMPERATURE: 97.2 F | RESPIRATION RATE: 16 BRPM | DIASTOLIC BLOOD PRESSURE: 72 MMHG | BODY MASS INDEX: 30.27 KG/M2 | OXYGEN SATURATION: 98 % | WEIGHT: 173 LBS | SYSTOLIC BLOOD PRESSURE: 128 MMHG

## 2024-06-17 DIAGNOSIS — N39.44 NOCTURNAL ENURESIS: ICD-10-CM

## 2024-06-17 DIAGNOSIS — E78.5 HYPERLIPIDEMIA LDL GOAL <130: ICD-10-CM

## 2024-06-17 DIAGNOSIS — R73.03 PREDIABETES: ICD-10-CM

## 2024-06-17 DIAGNOSIS — T16.1XXA FB EAR, RIGHT, INITIAL ENCOUNTER: Primary | ICD-10-CM

## 2024-06-17 PROCEDURE — 69200 CLEAR OUTER EAR CANAL: CPT | Mod: RT | Performed by: PHYSICIAN ASSISTANT

## 2024-06-17 PROCEDURE — 99213 OFFICE O/P EST LOW 20 MIN: CPT | Mod: 25 | Performed by: PHYSICIAN ASSISTANT

## 2024-06-17 ASSESSMENT — PAIN SCALES - GENERAL: PAINLEVEL: NO PAIN (0)

## 2024-06-17 NOTE — PROGRESS NOTES
"  Assessment & Plan     FB ear, right, initial encounter - black hearing aid tip  This is removed with alligator forceps today after some manipulation for positioning.  Patient tolerates this fairly well overall.  - REMOVE FB, EXT AUDITORY CANAL    Hyperlipidemia LDL goal <130  Prediabetes  Stable by report today.  No new concerns.  Follow-up this fall is advised.    Nocturnal enuresis  Patient doing well with current medication dose and is satisfied with current progress.  Follow-up As needed.      BMI  Estimated body mass index is 30.27 kg/m  as calculated from the following:    Height as of 11/17/23: 1.61 m (5' 3.39\").    Weight as of this encounter: 78.5 kg (173 lb).   Weight management plan: Discussed healthy diet and exercise guidelines    Dio Melo is a 73 year old, presenting for the following health issues:  Foreign Body (In right ear/)      6/17/2024     1:34 PM   Additional Questions   Roomed by Suzan   Accompanied by Self     Foreign Body    History of Present Illness       Reason for visit:  Check my right ear, for hearing aid tip  Symptom onset:  3-4 weeks ago  Symptoms include:  Tenderness is ear  Symptom intensity:  Mild  Symptom progression:  Staying the same  Had these symptoms before:  No  What makes it worse:  No  What makes it better:  No    He eats 2-3 servings of fruits and vegetables daily.He consumes 0 sweetened beverage(s) daily.He exercises with enough effort to increase his heart rate 30 to 60 minutes per day.  He exercises with enough effort to increase his heart rate 5 days per week.   He is taking medications regularly.       Review of Systems  Constitutional, HEENT, cardiovascular, pulmonary, GI, , musculoskeletal, neuro, skin, endocrine and psych systems are negative, except as otherwise noted.      Objective    /72   Pulse 62   Temp 97.2  F (36.2  C) (Temporal)   Resp 16   Wt 78.5 kg (173 lb)   SpO2 98%   BMI 30.27 kg/m    Body mass index is 30.27 " kg/m .  Physical Exam   GENERAL: alert and no distress  HENT: normal cephalic/atraumatic, right ear: Removable/replaceable black hearing aid tip is noted in the external auditory canal.  After some manipulation I was able to grasp it with a alligator forceps and remove it.  Overall patient tolerates this well., left ear: normal: no effusions, no erythema, normal landmarks, nose and mouth without ulcers or lesions, oropharynx clear, and oral mucous membranes moist  NECK: no adenopathy, no asymmetry, masses, or scars  RESP: lungs clear to auscultation - no rales, rhonchi or wheezes  CV: regular rate and rhythm, normal S1 S2, no S3 or S4, no murmur, click or rub, no peripheral edema  ABDOMEN: soft, nontender, no hepatosplenomegaly, no masses and bowel sounds normal  MS: no gross musculoskeletal defects noted, no edema          Signed Electronically by: Nicola Godoy PA-C

## 2024-06-17 NOTE — TELEPHONE ENCOUNTER
Nicola Godoy PA-C  [facility]; [facility] Pool7 minutes ago (7:14 AM)     RODGER  1340 arrival time on hold for him.  Electronically signed:    Nicola Banerjee PA-C     RN called patient and offered appointment time above. Patient agreed and is scheduled.     MARRY ZepedaN, RN

## 2024-06-24 ENCOUNTER — OFFICE VISIT (OUTPATIENT)
Dept: DERMATOLOGY | Facility: CLINIC | Age: 74
End: 2024-06-24
Payer: COMMERCIAL

## 2024-06-24 DIAGNOSIS — D17.0 LIPOMA OF NECK: ICD-10-CM

## 2024-06-24 PROCEDURE — 12042 INTMD RPR N-HF/GENIT2.6-7.5: CPT | Performed by: DERMATOLOGY

## 2024-06-24 PROCEDURE — 11426 EXC H-F-NK-SP B9+MARG >4 CM: CPT | Performed by: DERMATOLOGY

## 2024-06-24 PROCEDURE — 88304 TISSUE EXAM BY PATHOLOGIST: CPT | Performed by: DERMATOLOGY

## 2024-06-24 NOTE — NURSING NOTE
Excision/Mohs previsit information                                                    Diagnosis: lipoma  Site(s): left posterior neck    Savage you currently or have you previously had any of the following conditions:  Hepatitis:  NO  HIV/AIDS:  NO  Prolonged bleeding or bleeding disorder:  NO  Pacemaker: NO  Defibrillator:  NO  History of artificial or heart valve replacement:  NO  Endocarditis (inflammation of the inner lining of the heart's chambers and valves):  NO  Have you ever had a prosthetic joint infection:  NO  Pregnant or Breastfeeding:  N/A  Mobility device (wheelchair, transfer difficulty): NO    Sarita Che RN

## 2024-06-24 NOTE — LETTER
6/24/2024      Kameron Whitley  [address] [place] [address]      Dear Colleague,    Thank you for referring your patient, Kameron Whitley, to the [facility]. Please see a copy of my visit note below.    DERMATOLOGY EXCISION PROCEDURE NOTE    Dermatology Problem List:  1. 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 Scalp irritation  - current tx: OTC salicylic acid shampoo, Fluocinolone 0.01% oil  3. AK   - s/p cryotherapy  4. Lipoma, R posterior neck, s/p excision 6/24/24     ______________________________________       NAME OF PROCEDURE: Excision intermediate layered linear closure  Staff surgeon: Javier Easton DO  Medical Student: Cierra MS-4  Scrub Nurse: Sarita Che RN    PRE-OPERATIVE DIAGNOSIS:  lipoma  POST-OPERATIVE DIAGNOSIS: Same   LOCATION: right posterior neck  FINAL EXCISION SIZE(DEFECT SIZE): 6.5x3.3 cm  MARGIN: 0 cm  FINAL REPAIR LENGTH: 4.7 cm   ANESTHESIA: 18mL 1% lidocaine with 1:100,000 epinephrine    INDICATIONS: This patient presented with a 6.5cm x 3.3 cm lipoma. Excision was indicated. We discussed the principles of treatment and most likely complications including scarring, bleeding, infection, incomplete excision, wound dehiscence, pain, nerve damage, and recurrence. Informed consent was obtained and the patient underwent the procedure as follows:    PROCEDURE: The patient was taken to the operative suite. Time-out was performed.  The treatment area was anesthetized with 1% lidocaine with epinephrine. The area was prepped with Chlorhexidine and rinsed with sterile saline and draped with sterile towels. The lesion was delineated and excised down to subcutaneous fat in a elliptical manner. Hemostasis was obtained by electrocoagulation.     REPAIR: An intermediate layered linear closure was selected as the procedure which would maximally preserve both function and cosmesis.    After the excision of the tumor, the area was carefully  undermined. Hemostasis was obtained with bipolar electrocoagulation.  Closure was oriented so that the wound was in the patient's natural skin tension lines. The subcutaneous and dermal layers were then closed with 4-0 Monocryl sutures. The epidermis was then carefully approximated along the length of the wound using 5-0 FAG simple running sutures.     Estimated blood loss was less than 10 ml for all surgical sites. A sterile pressure dressing was applied and wound care instructions, with a written handout, were given. The [facility] alert and ambulatory.    The patient elected for pathology results to automatically release and understands that the clinical staff will contact them as soon as possible to notify them of the results.    Follow-up: PRN    Dr. Javier Easton was immediately available for the entire surgery and was physicially present for the key portions of the procedure.    Anatomic Pathology Results: pending    Clinical Follow-Up: n/a    Staff Involved:  Scribe/Staff    Scribe Disclosure:   I, Lorin Chavez, am serving as a scribe; to document services personally performed by Dr. Javier Easton - -based on data collection and the provider's statements to me.     Provider Disclosure:   The documentation recorded by the scribe accurately reflects the services I personally performed and the decisions made by me. I personally performed the procedures today.      Javier Easton DO    Department of Dermatology  [facility]: Phone: [phone], Fax:[phone]  [facility]: Phone: [phone], Fax: [phone]      Again, thank you for allowing me to participate in the care of your patient.        Sincerely,        Javier Easton MD

## 2024-06-24 NOTE — NURSING NOTE
The following medication was given:     MEDICATION:  Lidocaine with epinephrine 1% 1:[id number]  ROUTE: SQ  SITE: see procedure note  DOSE: 18 mL  LOT #: [id number]  : [employer]  EXPIRATION DATE: 4/30/25  NDC#: 08142-279-88  Was there drug waste? No  Multi-dose vial: Yes    Paper tape, Tegaderm and pressure dressing applied to excision site on posterior neck.  Wound care instructions reviewed with patient and AVS provided.  Patient verbalized understanding.  Patient will follow up for suture removal: N/A.  No further questions or concerns at this time.    Sarita Che RN  June 24, 2024

## 2024-06-24 NOTE — PATIENT INSTRUCTIONS
Caring for your skin after surgery    After your surgery, a pressure bandage will be placed over the area. This will prevent bleeding. Please follow these instructions over the next 1 to 2 weeks. Following this regimen will help to prevent complications as your wound heals.     For the first 48 hours after your surgery:    Leave the pressure dressing on and keep it dry. If it should come loose, you may re-tape it, but do not take it off.  Relax and take it easy. Do not do any vigorous exercise, heavy lifting or bending forward. This could cause the wound to bleed.  Post-operative pain is usually mild. You may alternate between 1000 mg of Tylenol (acetaminophen) and 400 mg of Ibuprofen every 4 hours.  Do not take more than 4,000 mg of acetaminophen in a 24-hour period or 3200 mg of Ibuprofen in a 24-hr period.  Avoid alcohol and vitamin E as these may increase your tendency to bleed.  You may put an ice pack around the bandaged area for 20 minutes at a time as needed. This may help reduce swelling, bruising, and pain. Make sure the ice pack is waterproof so that the pressure bandage doesn't get wet.  You may see a small amount of drainage or blood on your pressure bandage. This is normal. However:  If drainage or bleeding continues or saturates the bandage, you will need to apply firm pressure over the bandage with a clean washcloth for 15 minutes.  If bleeding continues after applying pressure for 15 minutes, apply an ice pack with gentle pressure to the bandaged area for another 15 minutes.  If bleeding still continues, call our office or go to the nearest emergency room.    48 Hours After Surgery:    Remove outer white bandage down to clear plastic film (Tegaderm).  You may notice dark brown, dried blood under the Tegaderm.  This is normal.    Leave the clear plastic film (Tegaderm) on for up to 2 weeks, as long as it is intact.  If it falls off prior to 2 weeks follow daily wound care below.  If it stays intact  for the full 2 weeks, then remove and treat as normal, healthy skin.    Daily Wound Care (if Tegaderm falls off prior to 2 weeks):    Wash wound with a mild soap and water.  Use caution when washing the wound, be gentle and do not let the forceful shower stream hit the wound directly. DO NOT WASH WITH HYDROGEN PEROXIDE AS THIS MIGHT CAUSE THE STITCHES TO DISSOLVE FASTER THAN WHAT WE WANT.    Pat dry.    Apply Vaseline (from a new container or tube) over the suture line with a Q-tip until it is completely healed. It is very important to keep the wound continuously moist, as wounds heal best in a moist environment.    Keep the site covered until it's healed.  You can cover it with a Telfa (non-stick) dressing and tape or a band-aid until healed with normal, healthy skin.      Call Us If:    You have bleeding that will not stop after applying pressure and ice.  You have pain that is not controlled with Tylenol and Ibuprofen.  You have signs or symptoms of an infection such as fever over 100 degrees Fahrenheit, redness, warmth or foul-smelling drainage from the wound    [facility]: [phone]   [facility]: [phone]  For urgent needs outside of business hours call the [facility] at [phone] and ask to speak with the dermatology resident on call

## 2024-06-24 NOTE — PROGRESS NOTES
DERMATOLOGY EXCISION PROCEDURE NOTE    Dermatology Problem List:  1. 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 Scalp irritation  - current tx: OTC salicylic acid shampoo, Fluocinolone 0.01% oil  3. AK   - s/p cryotherapy  4. Lipoma, R posterior neck, s/p excision 6/24/24     ______________________________________       NAME OF PROCEDURE: Excision intermediate layered linear closure  Staff surgeon: Javier Easton DO  Medical Student: Cierra MS-4  Scrub Nurse: Sarita Che RN    PRE-OPERATIVE DIAGNOSIS:  lipoma  POST-OPERATIVE DIAGNOSIS: Same   LOCATION: right posterior neck  FINAL EXCISION SIZE(DEFECT SIZE): 6.5x3.3 cm  MARGIN: 0 cm  FINAL REPAIR LENGTH: 4.7 cm   ANESTHESIA: 18mL 1% lidocaine with 1:100,000 epinephrine    INDICATIONS: This patient presented with a 6.5cm x 3.3 cm lipoma. Excision was indicated. We discussed the principles of treatment and most likely complications including scarring, bleeding, infection, incomplete excision, wound dehiscence, pain, nerve damage, and recurrence. Informed consent was obtained and the patient underwent the procedure as follows:    PROCEDURE: The patient was taken to the operative suite. Time-out was performed.  The treatment area was anesthetized with 1% lidocaine with epinephrine. The area was prepped with Chlorhexidine and rinsed with sterile saline and draped with sterile towels. The lesion was delineated and excised down to subcutaneous fat in a elliptical manner. Hemostasis was obtained by electrocoagulation.     REPAIR: An intermediate layered linear closure was selected as the procedure which would maximally preserve both function and cosmesis.    After the excision of the tumor, the area was carefully undermined. Hemostasis was obtained with bipolar electrocoagulation.  Closure was oriented so that the wound was in the patient's natural skin tension lines. The subcutaneous and dermal layers were then closed with 4-0 Monocryl sutures. The epidermis  was then carefully approximated along the length of the wound using 5-0 FAG simple running sutures.     Estimated blood loss was less than 10 ml for all surgical sites. A sterile pressure dressing was applied and wound care instructions, with a written handout, were given. The patient was discharged from the [facility] alert and ambulatory.    The patient elected for pathology results to automatically release and understands that the clinical staff will contact them as soon as possible to notify them of the results.    Follow-up: PRN    Dr. Javier Easton was immediately available for the entire surgery and was physicially present for the key portions of the procedure.    Anatomic Pathology Results: pending    Clinical Follow-Up: n/a    Staff Involved:  Scribe/Staff    Scribe Disclosure:   I, Lorin Chavez, am serving as a scribe; to document services personally performed by Dr. Javier Easton - -based on data collection and the provider's statements to me.     Provider Disclosure:   The documentation recorded by the scribe accurately reflects the services I personally performed and the decisions made by me. I personally performed the procedures today.      Javier Easton DO    Department of Dermatology  [facility]: Phone: [phone], Fax:[phone]  [facility]: Phone: [phone], Fax: [phone]

## 2024-06-25 ENCOUNTER — TELEPHONE (OUTPATIENT)
Dept: DERMATOLOGY | Facility: CLINIC | Age: 74
End: 2024-06-25
Payer: COMMERCIAL

## 2024-06-25 NOTE — TELEPHONE ENCOUNTER
Kameron is 1 day s/p excision of a lipoma on the posterior neck.     What are you doing to manage your pain?  Tylenol and Ibuprofen  Is it helping?  yes  Are you applying ice? yes  Have you had any noticeable bleeding through the bandage?  No, dressing is dry and intact.  Do you have any concerns?  no     Wound care directions reviewed.  Pt declined a post op appointment.  Next skin check has been scheduled.     Sarita Che RN

## 2024-06-28 ENCOUNTER — TELEPHONE (OUTPATIENT)
Dept: DERMATOLOGY | Facility: CLINIC | Age: 74
End: 2024-06-28
Payer: COMMERCIAL

## 2024-06-28 NOTE — TELEPHONE ENCOUNTER
Pt read [employer] message and will call the clinic with any questions or concerns.   Elva Brar RN on 6/28/2024 at 2:16 PM      Final Diagnosis  Right posterior neck:  - Lipoma - (see description)   Electronically signed by Chinmay Morales MD on 6/27/2024 at 10:39 AM         Dr Rustam Newell wanted us to inform you that the pathologist confirmed a benign lipoma was excised from your right posterior neck. As long as the wound is healing well, no additional surgery is necessary. Please reach out to the clinic with any questions or concerns.        Thanks,  KELLY Santoyo  Written by Elva Brar RN on 6/28/2024  8:26 AM CDT  Seen by patient Kameron Whitley on 6/28/2024 12:07 PM

## 2024-10-16 ENCOUNTER — OFFICE VISIT (OUTPATIENT)
Dept: DERMATOLOGY | Facility: CLINIC | Age: 74
End: 2024-10-16
Attending: DERMATOLOGY
Payer: COMMERCIAL

## 2024-10-16 DIAGNOSIS — L81.4 SOLAR LENTIGO: ICD-10-CM

## 2024-10-16 DIAGNOSIS — L21.9 DERMATITIS, SEBORRHEIC: Primary | ICD-10-CM

## 2024-10-16 DIAGNOSIS — L57.0 ACTINIC KERATOSIS: ICD-10-CM

## 2024-10-16 DIAGNOSIS — D18.01 CHERRY ANGIOMA: ICD-10-CM

## 2024-10-16 DIAGNOSIS — Z85.828 HISTORY OF SKIN CANCER: ICD-10-CM

## 2024-10-16 DIAGNOSIS — D22.9 MULTIPLE MELANOCYTIC NEVI: ICD-10-CM

## 2024-10-16 DIAGNOSIS — L82.1 SEBORRHEIC KERATOSIS: ICD-10-CM

## 2024-10-16 PROCEDURE — 17000 DESTRUCT PREMALG LESION: CPT | Performed by: DERMATOLOGY

## 2024-10-16 PROCEDURE — 17003 DESTRUCT PREMALG LES 2-14: CPT | Performed by: DERMATOLOGY

## 2024-10-16 PROCEDURE — 99213 OFFICE O/P EST LOW 20 MIN: CPT | Mod: 25 | Performed by: DERMATOLOGY

## 2024-10-16 RX ORDER — BETAMETHASONE DIPROPIONATE 0.5 MG/ML
LOTION, AUGMENTED TOPICAL DAILY
Qty: 60 ML | Refills: 11 | Status: SHIPPED | OUTPATIENT
Start: 2024-10-16

## 2024-10-16 NOTE — PROGRESS NOTES
[facility] Dermatology Note    Encounter Date: Oct 16, 2024    Dermatology Problem List:  1.  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 Scalp irritation  - current tx: OTC salicylic acid shampoo, augmented betamethasone lotion  3. AK's  - s/p cryotherapy  4. Lipoma, R posterior neck, s/p excision 6/24/24       ______________________________________    Impression/Plan:  1. History of nonmelanoma skin cancer  - no evidence of recurrence today     2. Reassurance provided for benign lesions not treated today including cherry angiomata, solar lentigines, seborrheic keratoses, and banal-appearing melanocytic nevi.     3. Seborrheic dermatitis; active on scalp  - continue OTC moisturizers and anti-dandruff shampoos  - start augmented betamethasone lotion once a day  - continue T-sal shampoo    4.  Actinic keratosis x2  - Cryotherapy procedure note: After verbal consent and discussion of risks and benefits including but no limited to dyspigmentation/scar, blister, and pain, 2 was(were) treated with 1-2mm freeze border for 2 cycles with liquid nitrogen. Post cryotherapy instructions were provided.    Follow-up in 1 year.       Staff Involved:  Staff & scribe    Provider Disclosure:   The documentation recorded by the scribe accurately reflects the services I personally performed and the decisions made by me.    Jean Marie Green MD   of Dermatology  Department of Dermatology  [facility] [employer] [employer]        CC:   Chief Complaint   Patient presents with    Skin Check     Recheck, no new concerns.       History of Present Illness:  Mr. Kameron Whitley is a 74 year old male who presents as a return patient.    Patient here for skin check, with no new areas of concern. His scalp is still flaky. He is using t-sal shampoo. Patient states he feels some bumps on his neck.    Labs:  N/a    Physical exam:  Vitals: There were no vitals taken for this  visit.  GEN: This is a well developed, well-nourished male in no acute distress, in a pleasant mood.    SKIN: [place] phototype II  - Full skin, which includes the head/face, both arms, chest, back, abdomen,both legs, genitalia and/or groin buttocks, digits and/or nails, was examined.  - There are dome shaped bright red papules on the head/neck, trunk, extremities.   - Multiple regular brown pigmented macules and papules are identified on the head/neck, trunk, extremities.   - Scattered brown macules on sun exposed areas.  - There are waxy stuck on tan to brown papules on the head/neck, trunk, extremities.   - erythematous scaly patch on crown of scalp   - There are erythematous macules with overyling adherent scale on the face and scalp x2  - No other lesions of concern on areas examined.     Past Medical History:   Past Medical History:   Diagnosis Date    Arthritis     Closed fracture of clavicle     Age 2 - Right Clavicle    Diverticulosis of large intestine 04/18/2024    Hemorrhoids, unspecified hemorrhoid type 04/18/2024     Past Surgical History:   Procedure Laterality Date    ARTHRODESIS FINGER(S) Right 2/8/2019    Procedure: Right index finger fusion;  Surgeon: EMILIANA Aguilar MD;  Location: [facility] OR    CARPAL TUNNEL RELEASE RT/LT  1/2006    right hand    COLONOSCOPY  4/2009    Normal-f/u 10 years (At [facility])    COLONOSCOPY N/A 2/25/2019    Procedure: COMBINED COLONOSCOPY, SINGLE POLYPECTOMY BY BIOPSY;  Surgeon: Brendan Nunes MD;  Location: [facility]    COLONOSCOPY N/A 4/18/2024    Procedure: Colonoscopy;  Surgeon: Dionicio Verma DO;  Location: [facility]    LAPAROSCOPIC CHOLECYSTECTOMY N/A 10/5/2016    Procedure: LAPAROSCOPIC CHOLECYSTECTOMY;  Surgeon: Nikita Anderson MD;  Location: [facility]    LAPAROSCOPIC HERNIORRHAPHY INGUINAL BILATERAL  1/10/2014    Procedure: LAPAROSCOPIC HERNIORRHAPHY INGUINAL BILATERAL;  Laparoscopic Bilateral Inguinal Hernia Repair;  Surgeon: Nikita Anderson MD;   Location: [facility] OR       Social History:   reports that he quit smoking about 46 years ago. His smoking use included cigarettes. He started smoking about 55 years ago. He has a 5 pack-year smoking history. He has never used smokeless tobacco. He reports current alcohol use of about 1.0 standard drink of alcohol per week. He reports that he does not use drugs.    Family History:  Family History   Problem Relation Age of Onset    Diabetes Mother     Arthritis Mother     Diabetes Brother     Diabetes Brother        Medications:  Current Outpatient Medications   Medication Sig Dispense Refill    atorvastatin (LIPITOR) 20 MG tablet Take 0.5 tablets (10 mg) by mouth daily 90 tablet 1    sildenafil (VIAGRA) 100 MG tablet Take 1 tablet (100 mg) by mouth daily as needed (erectile dysfunction) 30 tablet 1    tamsulosin (FLOMAX) 0.4 MG capsule Take 1 capsule (0.4 mg) by mouth daily 90 capsule 3     No Known Allergies

## 2024-10-16 NOTE — NURSING NOTE
Kameron Whitley's goals for this visit include:   Chief Complaint   Patient presents with    Skin Check     Recheck, no new concerns.     He requests these members of his care team be copied on today's visit information: yes    PCP: Nicola Godoy    Referring Provider:  Jean Marie Green MD  [address] [place] [id number]    There were no vitals taken for this visit.    Do you need any medication refills at today's visit? cj Hull, [facility]

## 2024-10-16 NOTE — LETTER
10/16/2024      Kameron Whitley  [address] [place] [address]      Dear Colleague,    Thank you for referring your patient, Kameron Whitley, to the [facility]. Please see a copy of my visit note below.    [facility] Dermatology Note    Encounter Date: Oct 16, 2024    Dermatology Problem List:  1.  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 Scalp irritation  - current tx: OTC salicylic acid shampoo, augmented betamethasone lotion  3. AK's  - s/p cryotherapy  4. Lipoma, R posterior neck, s/p excision 6/24/24       ______________________________________    Impression/Plan:  1. History of nonmelanoma skin cancer  - no evidence of recurrence today     2. Reassurance provided for benign lesions not treated today including cherry angiomata, solar lentigines, seborrheic keratoses, and banal-appearing melanocytic nevi.     3. Seborrheic dermatitis; active on scalp  - continue OTC moisturizers and anti-dandruff shampoos  - start augmented betamethasone lotion once a day  - continue T-sal shampoo    4.  Actinic keratosis x2  - Cryotherapy procedure note: After verbal consent and discussion of risks and benefits including but no limited to dyspigmentation/scar, blister, and pain, 2 was(were) treated with 1-2mm freeze border for 2 cycles with liquid nitrogen. Post cryotherapy instructions were provided.    Follow-up in 1 year.       Staff Involved:  Staff & scribe    Provider Disclosure:   The documentation recorded by the scribe accurately reflects the services I personally performed and the decisions made by me.    Jean Marie Green MD   of Dermatology  Department of Dermatology  [facility] [employer] [facility]        CC:   Chief Complaint   Patient presents with     Skin Check     Recheck, no new concerns.       History of Present Illness:  Mr. Kameron Whitley is a 74 year old male who presents as a return  patient.    Patient here for skin check, with no new areas of concern. His scalp is still flaky. He is using t-sal shampoo. Patient states he feels some bumps on his neck.    Labs:  N/a    Physical exam:  Vitals: There were no vitals taken for this visit.  GEN: This is a well developed, well-nourished male in no acute distress, in a pleasant mood.    SKIN: [place]cala phototype II  - Full skin, which includes the head/face, both arms, chest, back, abdomen,both legs, genitalia and/or groin buttocks, digits and/or nails, was examined.  - There are dome shaped bright red papules on the head/neck, trunk, extremities.   - Multiple regular brown pigmented macules and papules are identified on the head/neck, trunk, extremities.   - Scattered brown macules on sun exposed areas.  - There are waxy stuck on tan to brown papules on the head/neck, trunk, extremities.   - erythematous scaly patch on crown of scalp   - There are erythematous macules with overyling adherent scale on the face and scalp x2  - No other lesions of concern on areas examined.     Past Medical History:   Past Medical History:   Diagnosis Date     Arthritis      Closed fracture of clavicle     Age 2 - Right Clavicle     Diverticulosis of large intestine 04/18/2024     Hemorrhoids, unspecified hemorrhoid type 04/18/2024     Past Surgical History:   Procedure Laterality Date     ARTHRODESIS FINGER(S) Right 2/8/2019    Procedure: Right index finger fusion;  Surgeon: EMILIANA Aguilar MD;  Location: [facility] OR     CARPAL TUNNEL RELEASE RT/LT  1/2006    right hand     COLONOSCOPY  4/2009    Normal-f/u 10 years (At [facility])     COLONOSCOPY N/A 2/25/2019    Procedure: COMBINED COLONOSCOPY, SINGLE POLYPECTOMY BY BIOPSY;  Surgeon: Brendan Nunes MD;  Location:  GI     COLONOSCOPY N/A 4/18/2024    Procedure: Colonoscopy;  Surgeon: Dionicio Verma DO;  Location:  GI     LAPAROSCOPIC CHOLECYSTECTOMY N/A 10/5/2016    Procedure: LAPAROSCOPIC  CHOLECYSTECTOMY;  Surgeon: Nikita Anderson MD;  Location: [facility]     LAPAROSCOPIC HERNIORRHAPHY INGUINAL BILATERAL  1/10/2014    Procedure: LAPAROSCOPIC HERNIORRHAPHY INGUINAL BILATERAL;  Laparoscopic Bilateral Inguinal Hernia Repair;  Surgeon: Nikita Anderson MD;  Location:  OR       Social History:   reports that he quit smoking about 46 years ago. His smoking use included cigarettes. He started smoking about 55 years ago. He has a 5 pack-year smoking history. He has never used smokeless tobacco. He reports current alcohol use of about 1.0 standard drink of alcohol per week. He reports that he does not use drugs.    Family History:  Family History   Problem Relation Age of Onset     Diabetes Mother      Arthritis Mother      Diabetes Brother      Diabetes Brother        Medications:  Current Outpatient Medications   Medication Sig Dispense Refill     atorvastatin (LIPITOR) 20 MG tablet Take 0.5 tablets (10 mg) by mouth daily 90 tablet 1     sildenafil (VIAGRA) 100 MG tablet Take 1 tablet (100 mg) by mouth daily as needed (erectile dysfunction) 30 tablet 1     tamsulosin (FLOMAX) 0.4 MG capsule Take 1 capsule (0.4 mg) by mouth daily 90 capsule 3     No Known Allergies                 Again, thank you for allowing me to participate in the care of your patient.        Sincerely,        Jean Marie Green MD

## 2024-10-24 ENCOUNTER — IMMUNIZATION (OUTPATIENT)
Dept: FAMILY MEDICINE | Facility: CLINIC | Age: 74
End: 2024-10-24
Payer: COMMERCIAL

## 2024-10-24 PROCEDURE — 90662 IIV NO PRSV INCREASED AG IM: CPT

## 2024-10-24 PROCEDURE — 90480 ADMN SARSCOV2 VAC 1/ONLY CMP: CPT

## 2024-10-24 PROCEDURE — G0008 ADMIN INFLUENZA VIRUS VAC: HCPCS

## 2024-10-24 PROCEDURE — 91320 SARSCV2 VAC 30MCG TRS-SUC IM: CPT

## 2024-11-05 ENCOUNTER — MYC MEDICAL ADVICE (OUTPATIENT)
Dept: FAMILY MEDICINE | Facility: OTHER | Age: 74
End: 2024-11-05
Payer: COMMERCIAL

## 2024-11-05 DIAGNOSIS — R73.03 PREDIABETES: ICD-10-CM

## 2024-11-05 DIAGNOSIS — I10 BENIGN ESSENTIAL HYPERTENSION WITH TARGET BLOOD PRESSURE BELOW 140/90: Primary | ICD-10-CM

## 2024-11-05 DIAGNOSIS — Z12.5 SCREENING FOR PROSTATE CANCER: ICD-10-CM

## 2024-11-05 DIAGNOSIS — E78.5 HYPERLIPIDEMIA LDL GOAL <130: ICD-10-CM

## 2024-11-20 ENCOUNTER — LAB (OUTPATIENT)
Dept: LAB | Facility: CLINIC | Age: 74
End: 2024-11-20
Payer: COMMERCIAL

## 2024-11-20 DIAGNOSIS — R73.03 PREDIABETES: ICD-10-CM

## 2024-11-20 DIAGNOSIS — Z12.5 SCREENING FOR PROSTATE CANCER: ICD-10-CM

## 2024-11-20 DIAGNOSIS — E78.5 HYPERLIPIDEMIA LDL GOAL <130: ICD-10-CM

## 2024-11-20 DIAGNOSIS — I10 BENIGN ESSENTIAL HYPERTENSION WITH TARGET BLOOD PRESSURE BELOW 140/90: ICD-10-CM

## 2024-11-20 LAB
ALBUMIN SERPL BCG-MCNC: 4.1 G/DL (ref 3.5–5.2)
ALP SERPL-CCNC: 77 U/L (ref 40–150)
ALT SERPL W P-5'-P-CCNC: 26 U/L (ref 0–70)
ANION GAP SERPL CALCULATED.3IONS-SCNC: 10 MMOL/L (ref 7–15)
AST SERPL W P-5'-P-CCNC: 29 U/L (ref 0–45)
BILIRUB SERPL-MCNC: 0.7 MG/DL
BUN SERPL-MCNC: 16.3 MG/DL (ref 8–23)
CALCIUM SERPL-MCNC: 9.3 MG/DL (ref 8.8–10.4)
CHLORIDE SERPL-SCNC: 103 MMOL/L (ref 98–107)
CHOLEST SERPL-MCNC: 125 MG/DL
CREAT SERPL-MCNC: 1.1 MG/DL (ref 0.67–1.17)
EGFRCR SERPLBLD CKD-EPI 2021: 70 ML/MIN/1.73M2
EST. AVERAGE GLUCOSE BLD GHB EST-MCNC: 114 MG/DL
FASTING STATUS PATIENT QL REPORTED: YES
FASTING STATUS PATIENT QL REPORTED: YES
GLUCOSE SERPL-MCNC: 110 MG/DL (ref 70–99)
HBA1C MFR BLD: 5.6 %
HCO3 SERPL-SCNC: 25 MMOL/L (ref 22–29)
HDLC SERPL-MCNC: 64 MG/DL
HGB BLD-MCNC: 15.3 G/DL (ref 13.3–17.7)
LDLC SERPL CALC-MCNC: 44 MG/DL
NONHDLC SERPL-MCNC: 61 MG/DL
POTASSIUM SERPL-SCNC: 4.5 MMOL/L (ref 3.4–5.3)
PROT SERPL-MCNC: 6.9 G/DL (ref 6.4–8.3)
PSA SERPL DL<=0.01 NG/ML-MCNC: 1.06 NG/ML (ref 0–6.5)
SODIUM SERPL-SCNC: 138 MMOL/L (ref 135–145)
TRIGL SERPL-MCNC: 85 MG/DL

## 2024-11-20 PROCEDURE — 83036 HEMOGLOBIN GLYCOSYLATED A1C: CPT

## 2024-11-20 PROCEDURE — G0103 PSA SCREENING: HCPCS

## 2024-11-20 PROCEDURE — 80053 COMPREHEN METABOLIC PANEL: CPT

## 2024-11-20 PROCEDURE — 36415 COLL VENOUS BLD VENIPUNCTURE: CPT

## 2024-11-20 PROCEDURE — 80061 LIPID PANEL: CPT

## 2024-11-20 PROCEDURE — 85018 HEMOGLOBIN: CPT

## 2024-11-22 SDOH — HEALTH STABILITY: PHYSICAL HEALTH: ON AVERAGE, HOW MANY DAYS PER WEEK DO YOU ENGAGE IN MODERATE TO STRENUOUS EXERCISE (LIKE A BRISK WALK)?: 5 DAYS

## 2024-11-22 SDOH — HEALTH STABILITY: PHYSICAL HEALTH: ON AVERAGE, HOW MANY MINUTES DO YOU ENGAGE IN EXERCISE AT THIS LEVEL?: 30 MIN

## 2024-11-22 ASSESSMENT — SOCIAL DETERMINANTS OF HEALTH (SDOH): HOW OFTEN DO YOU GET TOGETHER WITH FRIENDS OR RELATIVES?: ONCE A WEEK

## 2024-11-25 ENCOUNTER — OFFICE VISIT (OUTPATIENT)
Dept: FAMILY MEDICINE | Facility: OTHER | Age: 74
End: 2024-11-25
Attending: PHYSICIAN ASSISTANT
Payer: COMMERCIAL

## 2024-11-25 VITALS
DIASTOLIC BLOOD PRESSURE: 90 MMHG | OXYGEN SATURATION: 98 % | TEMPERATURE: 97.3 F | RESPIRATION RATE: 11 BRPM | WEIGHT: 170.5 LBS | SYSTOLIC BLOOD PRESSURE: 147 MMHG | HEIGHT: 63 IN | BODY MASS INDEX: 30.21 KG/M2 | HEART RATE: 60 BPM

## 2024-11-25 DIAGNOSIS — Z00.00 MEDICARE ANNUAL WELLNESS VISIT, SUBSEQUENT: Primary | ICD-10-CM

## 2024-11-25 DIAGNOSIS — N52.1 ERECTILE DYSFUNCTION DUE TO DISEASES CLASSIFIED ELSEWHERE: ICD-10-CM

## 2024-11-25 DIAGNOSIS — E78.5 HYPERLIPIDEMIA LDL GOAL <130: ICD-10-CM

## 2024-11-25 DIAGNOSIS — Z00.00 ROUTINE HISTORY AND PHYSICAL EXAMINATION OF ADULT: ICD-10-CM

## 2024-11-25 DIAGNOSIS — M16.10 ARTHRITIS OF HIP: ICD-10-CM

## 2024-11-25 DIAGNOSIS — N39.44 NOCTURNAL ENURESIS: ICD-10-CM

## 2024-11-25 DIAGNOSIS — I10 HTN, GOAL BELOW 140/80: ICD-10-CM

## 2024-11-25 PROBLEM — G89.29 CHRONIC LEFT HIP PAIN: Status: ACTIVE | Noted: 2023-11-17

## 2024-11-25 PROCEDURE — 99214 OFFICE O/P EST MOD 30 MIN: CPT | Mod: 25 | Performed by: PHYSICIAN ASSISTANT

## 2024-11-25 PROCEDURE — 99397 PER PM REEVAL EST PAT 65+ YR: CPT | Performed by: PHYSICIAN ASSISTANT

## 2024-11-25 RX ORDER — TAMSULOSIN HYDROCHLORIDE 0.4 MG/1
0.4 CAPSULE ORAL DAILY
Qty: 90 CAPSULE | Refills: 3 | Status: SHIPPED | OUTPATIENT
Start: 2024-11-25

## 2024-11-25 RX ORDER — ATORVASTATIN CALCIUM 20 MG/1
10 TABLET, FILM COATED ORAL DAILY
Qty: 90 TABLET | Refills: 1 | Status: SHIPPED | OUTPATIENT
Start: 2024-11-25

## 2024-11-25 RX ORDER — SILDENAFIL 100 MG/1
100 TABLET, FILM COATED ORAL DAILY PRN
Qty: 30 TABLET | Refills: 1 | Status: SHIPPED | OUTPATIENT
Start: 2024-11-25

## 2024-11-25 ASSESSMENT — PAIN SCALES - GENERAL: PAINLEVEL_OUTOF10: NO PAIN (0)

## 2024-11-25 NOTE — PROGRESS NOTES
Preventive Care Visit  [facility]  Nicola Godoy PA-C, Family Medicine  Nov 25, 2024      Assessment & Plan     Medicare annual wellness visit, subsequent  Routine history and physical examination of adult  74-year-old male here for routine physical/Medicare well exam.  Repeat in 1 year.      Hyperlipidemia LDL goal <130  Recent labs show very good control of cholesterol.  Would have him continue with current medication dosage and consider recheck in 1 year. Ongoing dietary and lifestyle modifications are encouraged.    - atorvastatin (LIPITOR) 20 MG tablet; Take 0.5 tablets (10 mg) by mouth daily.    Erectile dysfunction due to diseases classified elsewhere  Stable by report.  Refilled medications.  Follow-up as needed.  - sildenafil (VIAGRA) 100 MG tablet; Take 1 tablet (100 mg) by mouth daily as needed (erectile dysfunction).    Nocturnal enuresis  Stable on current medications.  No new concerns with respect to this.  No other urinary tract signs and symptoms.  Follow-up in 1 year advised.  - tamsulosin (FLOMAX) 0.4 MG capsule; Take 1 capsule (0.4 mg) by mouth daily.    Arthritis of hip  Overall exam is stable at this point in time.  Would like to have him see orthopedics for ongoing management and long-term planning.  - Orthopedic  Referral; Future    HTN, goal below 140/80  Slightly elevated today.  Advised limiting the salt in his diet as well as any caffeine that he might be drinking.  Should increase his clear healthy fluids and continue with physical exercise.  Would advise him to be seen by one of our nursing staff in about 4 to 6 weeks for blood pressure check.  If still elevated at that point in time would recommend losartan at 25mg  daily and following up in 2 to 3 months.    Patient has been advised of split billing requirements and indicates understanding: Yes        Counseling  Appropriate preventive services were addressed with this patient via screening,  questionnaire, or discussion as appropriate for fall prevention, nutrition, physical activity, Tobacco-use cessation, social engagement, weight loss and cognition.  Checklist reviewing preventive services available has been given to the patient.  Reviewed patient's diet, addressing concerns and/or questions.   Patient reported safety concerns were addressed today.The patient was provided with written information regarding signs of hearing loss.       Work on weight loss  Regular exercise    Dio Melo is a 74 year old, presenting for the following:  Wellness Visit        11/25/2024    10:35 AM   Additional Questions   Roomed by gregory   Accompanied by self           HPI  Patients Left Hip is still bothering him    Hyperlipidemia Follow-Up    Are you regularly taking any medication or supplement to lower your cholesterol?   Yes-    Are you having muscle aches or other side effects that you think could be caused by your cholesterol lowering medication?  No  Medication Followup of Sildenafil  Taking Medication as prescribed: yes  Side Effects:  None  Medication Helping Symptoms:  yes  Medication Followup of tamsulosin  Taking Medication as prescribed: yes  Side Effects:  None  Medication Helping Symptoms:  yes  Health Care Directive  Patient does not have a Health Care Directive: Patient states has Advance Directive and will bring in a copy to clinic.      11/22/2024   General Health   How would you rate your overall physical health? Good   Feel stress (tense, anxious, or unable to sleep) Not at all            11/22/2024   Nutrition   Diet: Regular (no restrictions)            11/22/2024   Exercise   Days per week of moderate/strenous exercise 5 days   Average minutes spent exercising at this level 30 min            11/22/2024   Social Factors   Frequency of gathering with friends or relatives Once a week   Worry food won't last until get money to buy more No   Food not last or not have enough money for  food? No   Do you have housing? (Housing is defined as stable permanent housing and does not include staying ouside in a car, in a tent, in an abandoned building, in an overnight shelter, or couch-surfing.) Yes   Are you worried about losing your housing? No   Lack of transportation? No   Unable to get utilities (heat,electricity)? No            11/22/2024   Fall Risk   Fallen 2 or more times in the past year? No     No    Trouble with walking or balance? No     No        Patient-reported    Multiple values from one day are sorted in reverse-chronological order          11/22/2024   Activities of Daily Living- Home Safety   Needs help with the following daily activites None of the above   Safety concerns in the home Throw rugs in the hallway    No grab bars in the bathroom       Multiple values from one day are sorted in reverse-chronological order         11/22/2024   Dental   Dentist two times every year? Yes            11/22/2024   Hearing Screening   Hearing concerns? (!) I FEEL THAT PEOPLE ARE MUMBLING OR NOT SPEAKING CLEARLY.    (!) IT'S HARDER TO UNDERSTAND WOMEN'S VOICES THAN MEN'S VOICES.    (!) IT'S HARD TO FOLLOW A CONVERSATION IN A NOISY RESTAURANT OR CROWDED ROOM.    (!) TROUBLE UNDESTANDING A SPEAKER IN A PUBLIC MEETING OR [employer] SERVICE.    (!) TROUBLE UNDERSTANDING SOFT OR WHISPERED SPEECH.    (!) TROUBLE UNDERSTANDING SPEECH ON THE TELEPHONE       Multiple values from one day are sorted in reverse-chronological order         11/22/2024   Driving Risk Screening   Patient/family members have concerns about driving No            11/22/2024   General Alertness/Fatigue Screening   Have you been more tired than usual lately? No            11/22/2024   Urinary Incontinence Screening   Bothered by leaking urine in past 6 months No            11/22/2024   TB Screening   Were you born outside of the [place]? No            Today's PHQ-2 Score:       11/24/2024     1:36 PM   PHQ-2 ( 1999 [employer])   Q1: Little  interest or pleasure in doing things 0    Q2: Feeling down, depressed or hopeless 0    PHQ-2 Score 0    Q1: Little interest or pleasure in doing things Not at all   Q2: Feeling down, depressed or hopeless Not at all   PHQ-2 Score 0       Patient-reported           2024   Substance Use   Alcohol more than 3/day or more than 7/wk No   Do you have a current opioid prescription? No   How severe/bad is pain from 1 to 10? 1/10   Do you use any other substances recreationally? No        Social History     Tobacco Use    Smoking status: Former     Current packs/day: 0.00     Average packs/day: 0.5 packs/day for 10.0 years (5.0 ttl pk-yrs)     Types: Cigarettes     Start date: 1969     Quit date: 3/15/1978     Years since quittin.7    Smokeless tobacco: Never    Tobacco comments:     quit in his 30s   Vaping Use    Vaping status: Never Used   Substance Use Topics    Alcohol use: Yes     Alcohol/week: 1.0 standard drink of alcohol     Types: 1 Cans of beer per week     Comment: occ    Drug use: No       ASCVD Risk   The ASCVD Risk score (Isabelle NIETO, et al., 2019) failed to calculate for the following reasons:    The valid total cholesterol range is 130 to 320 mg/dL        Reviewed and updated as needed this visit by Provider                    Past Medical History:   Diagnosis Date    Arthritis     Closed fracture of clavicle     Age 2 - Right Clavicle    Diverticulosis of large intestine 2024    Hemorrhoids, unspecified hemorrhoid type 2024     Past Surgical History:   Procedure Laterality Date    ARTHRODESIS FINGER(S) Right 2019    Procedure: Right index finger fusion;  Surgeon: EMILIANA Aguilar MD;  Location: [facility] OR    CARPAL TUNNEL RELEASE RT/LT  2006    right hand    COLONOSCOPY  2009    Normal-f/u 10 years (At [facility])    COLONOSCOPY N/A 2019    Procedure: COMBINED COLONOSCOPY, SINGLE POLYPECTOMY BY BIOPSY;  Surgeon: Brendan Nunes MD;  Location:  GI     COLONOSCOPY N/A 4/18/2024    Procedure: Colonoscopy;  Surgeon: Dionicio Verma DO;  Location:  GI    LAPAROSCOPIC CHOLECYSTECTOMY N/A 10/5/2016    Procedure: LAPAROSCOPIC CHOLECYSTECTOMY;  Surgeon: Nikita Anderson MD;  Location: [facility]    LAPAROSCOPIC HERNIORRHAPHY INGUINAL BILATERAL  1/10/2014    Procedure: LAPAROSCOPIC HERNIORRHAPHY INGUINAL BILATERAL;  Laparoscopic Bilateral Inguinal Hernia Repair;  Surgeon: Nikita Anderson MD;  Location: [facility] OR     Lab work is in process  Labs reviewed in EPIC  BP Readings from Last 3 Encounters:   11/25/24 (!) 147/90   06/17/24 128/72   04/18/24 (!) 122/91    Wt Readings from Last 3 Encounters:   11/25/24 77.3 kg (170 lb 8 oz)   06/17/24 78.5 kg (173 lb)   04/18/24 74.6 kg (164 lb 8 oz)                  Patient Active Problem List   Diagnosis    Impacted cerumen    Groin mass    ED (erectile dysfunction)    Erectile dysfunction due to diseases classified elsewhere    Cholelithiasis with acute cholecystitis without biliary obstruction    Hyperlipidemia LDL goal <130    Disturbance of skin sensation    Sanborn-neck deformity of finger    Sinus bradycardia    HTN, goal below 140/80    Skin lesion - right anterior chest at the clavicle.    Right shoulder strain, initial encounter    Chronic pain of left knee    Prediabetes    Chronic left hip pain    Diverticulosis of large intestine    Hemorrhoids, unspecified hemorrhoid type    FB ear, right, initial encounter - black hearing aid tip    Nocturnal enuresis     Past Surgical History:   Procedure Laterality Date    ARTHRODESIS FINGER(S) Right 2/8/2019    Procedure: Right index finger fusion;  Surgeon: EMILIANA Aguilar MD;  Location: [facility]    CARPAL TUNNEL RELEASE RT/LT  1/2006    right hand    COLONOSCOPY  4/2009    Normal-f/u 10 years ([facility])    COLONOSCOPY N/A 2/25/2019    Procedure: COMBINED COLONOSCOPY, SINGLE POLYPECTOMY BY BIOPSY;  Surgeon: Brendan Nunes MD;  Location:  GI    COLONOSCOPY N/A  2024    Procedure: Colonoscopy;  Surgeon: Dionicio Verma DO;  [facility]: [facility]    LAPAROSCOPIC CHOLECYSTECTOMY N/A 10/5/2016    Procedure: LAPAROSCOPIC CHOLECYSTECTOMY;  Surgeon: Nikita Anderson MD;  [facility]    LAPAROSCOPIC HERNIORRHAPHY INGUINAL BILATERAL  1/10/2014    Procedure: LAPAROSCOPIC HERNIORRHAPHY INGUINAL BILATERAL;  Laparoscopic Bilateral Inguinal Hernia Repair;  Surgeon: Nikita Anderson MD;  Location: [facility]       Social History     Tobacco Use    Smoking status: Former     Current packs/day: 0.00     Average packs/day: 0.5 packs/day for 10.0 years (5.0 ttl pk-yrs)     Types: Cigarettes     Start date: 1969     Quit date: 3/15/1978     Years since quittin.7    Smokeless tobacco: Never    Tobacco comments:     quit in his 30s   Substance Use Topics    Alcohol use: Yes     Alcohol/week: 1.0 standard drink of alcohol     Types: 1 Cans of beer per week     Comment: occ     Family History   Problem Relation Age of Onset    Diabetes Mother     Arthritis Mother     Diabetes Brother     Diabetes Brother          Current Outpatient Medications   Medication Sig Dispense Refill    atorvastatin (LIPITOR) 20 MG tablet Take 0.5 tablets (10 mg) by mouth daily. 90 tablet 1    augmented betamethasone dipropionate (DIPROLENE) 0.05 % external lotion Apply topically daily. 60 mL 11    sildenafil (VIAGRA) 100 MG tablet Take 1 tablet (100 mg) by mouth daily as needed (erectile dysfunction). 30 tablet 1    tamsulosin (FLOMAX) 0.4 MG capsule Take 1 capsule (0.4 mg) by mouth daily. 90 capsule 3     No Known Allergies  Recent Labs   Lab Test 24  0734 23  0817 23  0837 21  0758 20  1045 19  0804   A1C 5.6 5.7*  --   --   --   --    LDL 44 43 42   < > 25 49   HDL 64 70 68   < > 80 77   TRIG 85 77 91   < > 84 78   ALT 26 28 27   < > 30 30   CR 1.10 1.12 1.09   < > 0.96 1.16   GFRESTIMATED 70 69 71   < > 79 63   GFRESTBLACK  --   --   --   --  >90 73   POTASSIUM  "4.5 4.0 4.1   < > 4.0 4.0   TSH  --   --   --   --  1.76  --     < > = values in this interval not displayed.      Current providers sharing in care for this patient include:  Patient Care Team:  Nicola Godoy PA-C as PCP - General (Physician Assistant)  Nicola Godoy PA-C as Assigned PCP  Bouchra Reyes AuD as Audiologist (Audiology)  Jean Marie Green MD as Assigned Surgical Provider    The following health maintenance items are reviewed in Epic and correct as of today:  Health Maintenance   Topic Date Due    ANNUAL REVIEW OF HM ORDERS  11/17/2024    MEDICARE ANNUAL WELLNESS VISIT  11/17/2024    DTAP/TDAP/TD IMMUNIZATION (2 - Td or Tdap) 03/06/2025    BMP  11/20/2025    LIPID  11/20/2025    FALL RISK ASSESSMENT  11/25/2025    GLUCOSE  11/20/2027    ADVANCE CARE PLANNING  11/17/2028    COLORECTAL CANCER SCREENING  04/18/2029    HEPATITIS C SCREENING  Completed    PHQ-2 (once per calendar year)  Completed    INFLUENZA VACCINE  Completed    Pneumococcal Vaccine: 65+ Years  Completed    ZOSTER IMMUNIZATION  Completed    RSV VACCINE  Completed    COVID-19 Vaccine  Completed    HPV IMMUNIZATION  Aged Out    MENINGITIS IMMUNIZATION  Aged Out    RSV MONOCLONAL ANTIBODY  Aged Out         Review of Systems  Constitutional, HEENT, cardiovascular, pulmonary, GI, , musculoskeletal, neuro, skin, endocrine and psych systems are negative, except as otherwise noted.     Objective    Exam  BP (!) 147/90   Pulse 60   Temp 97.3  F (36.3  C) (Temporal)   Resp 11   Ht 1.61 m (5' 3.39\")   Wt 77.3 kg (170 lb 8 oz)   SpO2 98%   BMI 29.84 kg/m     Estimated body mass index is 29.84 kg/m  as calculated from the following:    Height as of this encounter: 1.61 m (5' 3.39\").    Weight as of this encounter: 77.3 kg (170 lb 8 oz).    Physical Exam  GENERAL: alert and no distress  EYES: Eyes grossly normal to inspection, PERRL and conjunctivae and sclerae normal  HENT: ear canals and TM's normal, nose and mouth without " ulcers or lesions  NECK: no adenopathy, no asymmetry, masses, or scars  RESP: lungs clear to auscultation - no rales, rhonchi or wheezes  CV: regular rate and rhythm, normal S1 S2, no S3 or S4, no murmur, click or rub, no peripheral edema  ABDOMEN: soft, nontender, no hepatosplenomegaly, no masses and bowel sounds normal  MS: no gross musculoskeletal defects noted, no edema  ORTHO: Hip Exam: Palpation: Tender:   Left trochanteric region but more anterior to it and into the groin crease  Non-tender: Laterally in the buttock  Range of Motion:  Left Hip  flexion   decreased, extension  decreased, external rotation  decreased, internal rotation  decreased, adduction  decreased, abduction  decreased, Right Hip improved as compared to the left.  Strength: Strength appears to be maintained.  Special tests:    SKIN: no suspicious lesions or rashes to exposed visible skin today.  NEURO: Normal strength and tone, mentation intact and speech normal  PSYCH: mentation appears normal, affect normal/bright         11/25/2024   Mini Cog   Clock Draw Score 2 Normal   3 Item Recall 2 objects recalled   Mini Cog Total Score 4                 Signed Electronically by: Nicola Godoy PA-C

## 2024-11-26 ENCOUNTER — PATIENT OUTREACH (OUTPATIENT)
Dept: CARE COORDINATION | Facility: CLINIC | Age: 74
End: 2024-11-26
Payer: COMMERCIAL

## 2024-11-27 DIAGNOSIS — M25.552 BILATERAL HIP PAIN: Primary | ICD-10-CM

## 2024-11-27 DIAGNOSIS — M25.551 BILATERAL HIP PAIN: Primary | ICD-10-CM

## 2024-12-01 ASSESSMENT — HOOS JR
BENDING TO THE FLOOR TO PICK UP OBJECT: MILD
RISING FROM SITTING: MILD
HOOS JR TOTAL INTERVAL SCORE: 76.78
GOING UP OR DOWN STAIRS: MILD
LYING IN BED (TURNING OVER, MAINTAINING HIP POSITION): MILD

## 2024-12-02 ENCOUNTER — OFFICE VISIT (OUTPATIENT)
Dept: ORTHOPEDICS | Facility: CLINIC | Age: 74
End: 2024-12-02
Payer: COMMERCIAL

## 2024-12-02 ENCOUNTER — ANCILLARY PROCEDURE (OUTPATIENT)
Dept: GENERAL RADIOLOGY | Facility: CLINIC | Age: 74
End: 2024-12-02
Attending: ORTHOPAEDIC SURGERY
Payer: COMMERCIAL

## 2024-12-02 VITALS
WEIGHT: 170.5 LBS | SYSTOLIC BLOOD PRESSURE: 149 MMHG | HEART RATE: 63 BPM | BODY MASS INDEX: 30.21 KG/M2 | HEIGHT: 63 IN | DIASTOLIC BLOOD PRESSURE: 92 MMHG

## 2024-12-02 DIAGNOSIS — M16.0 PRIMARY OSTEOARTHRITIS OF BOTH HIPS: Primary | ICD-10-CM

## 2024-12-02 DIAGNOSIS — M25.551 BILATERAL HIP PAIN: ICD-10-CM

## 2024-12-02 DIAGNOSIS — M25.552 BILATERAL HIP PAIN: ICD-10-CM

## 2024-12-02 PROCEDURE — 73522 X-RAY EXAM HIPS BI 3-4 VIEWS: CPT | Mod: TC | Performed by: RADIOLOGY

## 2024-12-02 PROCEDURE — 99203 OFFICE O/P NEW LOW 30 MIN: CPT | Performed by: ORTHOPAEDIC SURGERY

## 2024-12-02 ASSESSMENT — PAIN SCALES - GENERAL: PAINLEVEL_OUTOF10: MILD PAIN (2)

## 2024-12-02 NOTE — LETTER
12/2/2024      Kameron Whitley  [address] [place] [address]      Dear Colleague,    Thank you for referring your patient, Kameron Whitley, to the [facility]. Please see a copy of my visit note below.    ORTHOPEDIC CONSULT      Chief Complaint: Kameron Whitley is a 74 year old male who is being seen for   Chief Complaint   Patient presents with     Left Hip - Pain     For past few years. Pain in anterior left hip with radiation into the lateral hip. Pain with squatting, pivoting. Popping and cracking       History of Present Illness:   Presents for bilateral hip pain left worse than right.  Groin into the lateral aspect.  No specific injuries or traumas.  He stays very active.  He notices it after he gets off the treadmill exercises.  Pain with pivoting but walking straight head feels fine.  Takes an occasional over-the-counter medication which is helpful.  He is had no surgeries or injections to the hip.      Patient's past medical, surgical, social and family histories reviewed.     Past Medical History:   Diagnosis Date     Arthritis      Closed fracture of clavicle     Age 2 - Right Clavicle     Diverticulosis of large intestine 04/18/2024     Hemorrhoids, unspecified hemorrhoid type 04/18/2024       Past Surgical History:   Procedure Laterality Date     ARTHRODESIS FINGER(S) Right 2/8/2019    Procedure: Right index finger fusion;  Surgeon: EMILIANA Aguilar MD;  Location: [facility]     CARPAL TUNNEL RELEASE RT/LT  1/2006    right hand     COLONOSCOPY  4/2009    Normal-f/u 10 years (At [facility])     COLONOSCOPY N/A 2/25/2019    Procedure: COMBINED COLONOSCOPY, SINGLE POLYPECTOMY BY BIOPSY;  Surgeon: Brendan Nunes MD;  Location: [facility]     COLONOSCOPY N/A 4/18/2024    Procedure: Colonoscopy;  Surgeon: Dionicio Verma DO;  Location: [facility]     LAPAROSCOPIC CHOLECYSTECTOMY N/A 10/5/2016    Procedure: LAPAROSCOPIC CHOLECYSTECTOMY;  Surgeon: Nikita Anderson  "MD;  Location:  OR     LAPAROSCOPIC HERNIORRHAPHY INGUINAL BILATERAL  1/10/2014    Procedure: LAPAROSCOPIC HERNIORRHAPHY INGUINAL BILATERAL;  Laparoscopic Bilateral Inguinal Hernia Repair;  Surgeon: Nikita Anderson MD;  Location:  OR       Medications:  Current Outpatient Medications   Medication Sig Dispense Refill     atorvastatin (LIPITOR) 20 MG tablet Take 0.5 tablets (10 mg) by mouth daily. 90 tablet 1     augmented betamethasone dipropionate (DIPROLENE) 0.05 % external lotion Apply topically daily. 60 mL 11     sildenafil (VIAGRA) 100 MG tablet Take 1 tablet (100 mg) by mouth daily as needed (erectile dysfunction). 30 tablet 1     tamsulosin (FLOMAX) 0.4 MG capsule Take 1 capsule (0.4 mg) by mouth daily. 90 capsule 3     No current facility-administered medications for this visit.       No Known Allergies    Social History     Occupational History     Not on file   Tobacco Use     Smoking status: Former     Current packs/day: 0.00     Average packs/day: 0.5 packs/day for 10.0 years (5.0 ttl pk-yrs)     Types: Cigarettes     Start date: 1969     Quit date: 3/15/1978     Years since quittin.7     Smokeless tobacco: Never     Tobacco comments:     quit in his 30s   Vaping Use     Vaping status: Never Used   Substance and Sexual Activity     Alcohol use: Yes     Alcohol/week: 1.0 standard drink of alcohol     Types: 1 Cans of beer per week     Comment: occ     Drug use: No     Sexual activity: Yes     Partners: Female       Family History   Problem Relation Age of Onset     Diabetes Mother      Arthritis Mother      Diabetes Brother      Diabetes Brother        REVIEW OF SYSTEMS  10 point review systems performed otherwise negative as noted as per history of present illness.    Physical Exam:  Vitals: BP (!) 149/92   Pulse 63   Ht 1.61 m (5' 3.39\")   Wt 77.3 kg (170 lb 8 oz)   BMI 29.83 kg/m    BMI= Body mass index is 29.83 kg/m .  Constitutional: healthy, alert and no acute distress "   Psychiatric: mentation appears normal and affect normal/bright  NEURO: no focal deficits  RESP: Normal with easy respirations and no use of accessory muscles to breathe, no audible wheezing or retractions  CV: LLE:  no edema         Regular rate and rhythm by palpation  SKIN: No erythema, rashes, excoriation, or breakdown. No evidence of infection.   JOINT/EXTREMITIES:left: No gross deformity.  No focal areas of tenderness.  No atrophy.  Restrictions intra-articular attempts to do so causes pain.  Extra rotation 15 degrees.  Hip flexion to approximately 110.  Negative straight leg.     GAIT: not tested     Diagnostic Modalities:    Right hip x-rays: Weightbearing views taken today in the clinic shows no acute fractures or dislocations.  Mild to moderate joint space narrowing some irregularity noted along the femoral head.  Left hip x-rays: Weightbearing view taken today in the clinic shows moderate to severe joint space narrowing with small rimming osteophytes.  Independent visualization of the images was performed.      Impression: left greater than right hip primary osteoarthritis    Plan:  All of the above pertinent physical exam and imaging modalities findings was reviewed with Kameron.    Treatment options discussed.  We reviewed conservative as well as surgical.  He does report 1 over-the-counter Tylenol or ibuprofen seems to be enough to really help with his pain.  We discussed utilizing that and see how things go with his activity.  He has a trip to [facility] the first part of February.  I have instructed him to contact us the first week or 2 January to see how he is doing.  Given the trip being the first part of February we would get him hooked up with Dr. Shaw for a ultrasound-guided left hip intra-articular injection.  He agrees.        Return to clinic 6, week(s), PRN, or sooner as needed for changes.  Re-x-ray on return: No    Kiel Lawrence D.O.      Again, thank you for allowing me to participate in the  care of your patient.        Sincerely,        Alexis Lawrence, DO

## 2024-12-02 NOTE — PROGRESS NOTES
ORTHOPEDIC CONSULT      Chief Complaint: Kameron Whitley is a 74 year old male who is being seen for   Chief Complaint   Patient presents with    Left Hip - Pain     For past few years. Pain in anterior left hip with radiation into the lateral hip. Pain with squatting, pivoting. Popping and cracking       History of Present Illness:   Presents for bilateral hip pain left worse than right.  Groin into the lateral aspect.  No specific injuries or traumas.  He stays very active.  He notices it after he gets off the treadmill exercises.  Pain with pivoting but walking straight head feels fine.  Takes an occasional over-the-counter medication which is helpful.  He is had no surgeries or injections to the hip.      Patient's past medical, surgical, social and family histories reviewed.     Past Medical History:   Diagnosis Date    Arthritis     Closed fracture of clavicle     Age 2 - Right Clavicle    Diverticulosis of large intestine 04/18/2024    Hemorrhoids, unspecified hemorrhoid type 04/18/2024       Past Surgical History:   Procedure Laterality Date    ARTHRODESIS FINGER(S) Right 2/8/2019    Procedure: Right index finger fusion;  Surgeon: EMILIANA Aguilar MD;  Location: [facility] OR    CARPAL TUNNEL RELEASE RT/LT  1/2006    right hand    COLONOSCOPY  4/2009    Normal-f/u 10 years (At [facility])    COLONOSCOPY N/A 2/25/2019    Procedure: COMBINED COLONOSCOPY, SINGLE POLYPECTOMY BY BIOPSY;  Surgeon: Brendan Nunes MD;  Location: [facility]    COLONOSCOPY N/A 4/18/2024    Procedure: Colonoscopy;  Surgeon: Dionicio Verma DO;  Location:  GI    LAPAROSCOPIC CHOLECYSTECTOMY N/A 10/5/2016    Procedure: LAPAROSCOPIC CHOLECYSTECTOMY;  Surgeon: Nikita Anderson MD;  Location: [facility] OR    LAPAROSCOPIC HERNIORRHAPHY INGUINAL BILATERAL  1/10/2014    Procedure: LAPAROSCOPIC HERNIORRHAPHY INGUINAL BILATERAL;  Laparoscopic Bilateral Inguinal Hernia Repair;  Surgeon: Nikita Anderson MD;  Location:  OR  "      Medications:  Current Outpatient Medications   Medication Sig Dispense Refill    atorvastatin (LIPITOR) 20 MG tablet Take 0.5 tablets (10 mg) by mouth daily. 90 tablet 1    augmented betamethasone dipropionate (DIPROLENE) 0.05 % external lotion Apply topically daily. 60 mL 11    sildenafil (VIAGRA) 100 MG tablet Take 1 tablet (100 mg) by mouth daily as needed (erectile dysfunction). 30 tablet 1    tamsulosin (FLOMAX) 0.4 MG capsule Take 1 capsule (0.4 mg) by mouth daily. 90 capsule 3     No current facility-administered medications for this visit.       No Known Allergies    Social History     Occupational History    Not on file   Tobacco Use    Smoking status: Former     Current packs/day: 0.00     Average packs/day: 0.5 packs/day for 10.0 years (5.0 ttl pk-yrs)     Types: Cigarettes     Start date: 1969     Quit date: 3/15/1978     Years since quittin.7    Smokeless tobacco: Never    Tobacco comments:     quit in his 30s   Vaping Use    Vaping status: Never Used   Substance and Sexual Activity    Alcohol use: Yes     Alcohol/week: 1.0 standard drink of alcohol     Types: 1 Cans of beer per week     Comment: occ    Drug use: No    Sexual activity: Yes     Partners: Female       Family History   Problem Relation Age of Onset    Diabetes Mother     Arthritis Mother     Diabetes Brother     Diabetes Brother        REVIEW OF SYSTEMS  10 point review systems performed otherwise negative as noted as per history of present illness.    Physical Exam:  Vitals: BP (!) 149/92   Pulse 63   Ht 1.61 m (5' 3.39\")   Wt 77.3 kg (170 lb 8 oz)   BMI 29.83 kg/m    BMI= Body mass index is 29.83 kg/m .  Constitutional: healthy, alert and no acute distress   Psychiatric: mentation appears normal and affect normal/bright  NEURO: no focal deficits  RESP: Normal with easy respirations and no use of accessory muscles to breathe, no audible wheezing or retractions  CV: LLE:  no edema         Regular rate and rhythm by " palpation  SKIN: No erythema, rashes, excoriation, or breakdown. No evidence of infection.   JOINT/EXTREMITIES:left: No gross deformity.  No focal areas of tenderness.  No atrophy.  Restrictions intra-articular attempts to do so causes pain.  Extra rotation 15 degrees.  Hip flexion to approximately 110.  Negative straight leg.     GAIT: not tested     Diagnostic Modalities:    Right hip x-rays: Weightbearing views taken today in the clinic shows no acute fractures or dislocations.  Mild to moderate joint space narrowing some irregularity noted along the femoral head.  Left hip x-rays: Weightbearing view taken today in the clinic shows moderate to severe joint space narrowing with small rimming osteophytes.  Independent visualization of the images was performed.      Impression: left greater than right hip primary osteoarthritis    Plan:  All of the above pertinent physical exam and imaging modalities findings was reviewed with Kameron.    Treatment options discussed.  We reviewed conservative as well as surgical.  He does report 1 over-the-counter Tylenol or ibuprofen seems to be enough to really help with his pain.  We discussed utilizing that and see how things go with his activity.  He has a trip to [facility] the first part of February.  I have instructed him to contact us the first week or 2 January to see how he is doing.  Given the trip being the first part of February we would get him hooked up with Dr. Shaw for a ultrasound-guided left hip intra-articular injection.  He agrees.        Return to clinic 6, week(s), PRN, or sooner as needed for changes.  Re-x-ray on return: No    Kiel Lawrence D.O.   (E4) spontaneous

## 2024-12-30 ENCOUNTER — ALLIED HEALTH/NURSE VISIT (OUTPATIENT)
Dept: FAMILY MEDICINE | Facility: CLINIC | Age: 74
End: 2024-12-30
Attending: PHYSICIAN ASSISTANT
Payer: COMMERCIAL

## 2024-12-30 VITALS — DIASTOLIC BLOOD PRESSURE: 82 MMHG | SYSTOLIC BLOOD PRESSURE: 128 MMHG

## 2024-12-30 DIAGNOSIS — Z01.30 BLOOD PRESSURE CHECK: Primary | ICD-10-CM

## 2024-12-30 PROCEDURE — 99207 PR NO CHARGE NURSE ONLY: CPT

## 2024-12-30 NOTE — PROCEDURES
Kameron Whitley is a 74 year old year old patient who comes in today for a Blood Pressure check because of ongoing blood pressure monitoring.  Vital Signs as repeated by RN Initial reading elevated at 146/84, after 10 minute sitting, Recheck 128/82  Patient is not prescribed medication.   Patient is monitoring Blood Pressure at home.  Average readings if yes are high 120, low 130 systolic.  Low 70's to upper 60's diastolic.   Current complaints: none    Disposition:  Route to PCP and RN team to update patient.     Preferred Pharmacy is [facility] in [place].     Contact number: Home [phone], Please call with update. If needing to recheck in 6-8 week, getting prescription, or ok to wait til next annual wellness.     Hernan Sen RN on 12/30/2024 at 10:40 AM      
Bladder non-tender and non-distended.

## 2025-01-13 ENCOUNTER — MYC REFILL (OUTPATIENT)
Dept: FAMILY MEDICINE | Facility: OTHER | Age: 75
End: 2025-01-13
Payer: COMMERCIAL

## 2025-01-13 ENCOUNTER — MYC MEDICAL ADVICE (OUTPATIENT)
Dept: FAMILY MEDICINE | Facility: OTHER | Age: 75
End: 2025-01-13
Payer: COMMERCIAL

## 2025-01-13 DIAGNOSIS — E78.5 HYPERLIPIDEMIA LDL GOAL <130: ICD-10-CM

## 2025-01-13 RX ORDER — ATORVASTATIN CALCIUM 20 MG/1
10 TABLET, FILM COATED ORAL DAILY
Qty: 90 TABLET | Refills: 1 | OUTPATIENT
Start: 2025-01-13

## 2025-01-21 NOTE — PROGRESS NOTES
Kameron Whitley  :  1950  DOS: 2025  MRN: [id number]    Sports Medicine Clinic Procedure    Ultrasound Guided Left Intra-Articular Hip Injection    Clinical History: Primary osteoarthritis of the left hip    Diagnosis:   1. Primary osteoarthritis of left hip      Referring Physician: Dr. Alexis Lawrence  Intraarticular Hip Injection - Ultrasound Guided  The patient was informed of the risks and the benefits of the procedure and a written consent was signed.  The patient s hip was prepped with chlorhexidine in sterile fashion.   Local anesthesia was performed using a 25-gauge 1.5-inch needle to administer 3 mL of 1% lidocaine without epinephrine.  1 mL (40 mg/mL) of triamcinolone suspension was drawn up into a 10 mL syringe with 3 mL of 1% lidocaine and 3 mL of 0.5% bupivacaine.   Injection was performed using sterile technique.  Under ultrasound guidance a 3.5-inch 22-gauge needle was used to enter the femoracetabular joint.  Anterior approach was used, needle placement was visualized and documented with ultrasound.  Ultrasound visualization was necessary due to decreased joint space in the setting of osteoarthritis.  Injection performed in-plane to the probe.  Injection solution visualized within the joint space.  Images were permanently stored for the patient's record.  There were no complications. The patient tolerated the procedure well. There was negligible bleeding.    Large Joint Injection/Arthocentesis: L hip joint    Date/Time: 2025 9:01 AM    Performed by: Avinash Shaw DO  Authorized by: Avinash Shaw DO    Indications:  Pain  Needle Size:  22 G  Guidance: ultrasound    Approach:  Anterior  Location:  Hip      Site:  L hip joint  Medications:  40 mg triamcinolone 40 MG/ML; 3 mL lidocaine 1 %; 3 mL BUPivacaine 0.5 %  Outcome:  Tolerated well, no immediate complications  Procedure discussed: discussed risks, benefits, and alternatives    Consent Given by:  Patient  Prep: patient was  prepped and draped in usual sterile fashion     3 mL 1% Lidocaine used for anesthetic     Ultrasound images of procedure were permanently stored.              Impression:  Successful ultrasound-guided left hip joint corticosteroid injection.    Plan:  - Injection:    - Expectations and goals of the injection were discussed and verbal and written consent was obtained.  - Performed a corticosteroid injection of the left hip today in clinic. Patient tolerated the procedure well without complications.    - Post-procedure instructions:    - Keep the injection site clean and dry.   - Do not submerge the injection site for 24 hours (no baths, pools). Showers are ok.   - Rest the area for 24-48 hours before resuming normal activities. Avoid overexerting the area for the first few weeks.   - It may take 2-3 days to start noticing the effects of the injection and up to 3-4 weeks to feel significant benefits.   - Follow up:          - With Dr. Lawrence as recommended for updates to treatment plan, or sooner for new/worsening symptoms.  - Patient has clinic contact information for questions or concerns.      Avinash Shaw DO, CAJAZMINE  [facility] - Sports Medicine  [facility] - Department of Orthopedic Surgery     Disclaimer:  This note was prepared and written using Dragon Medical dictation software. As a result, there may be errors in the script that have gone undetected. Please consider this when interpreting the information in this note.

## 2025-01-22 ENCOUNTER — OFFICE VISIT (OUTPATIENT)
Dept: ORTHOPEDICS | Facility: CLINIC | Age: 75
End: 2025-01-22
Payer: COMMERCIAL

## 2025-01-22 DIAGNOSIS — M16.12 PRIMARY OSTEOARTHRITIS OF LEFT HIP: Primary | ICD-10-CM

## 2025-01-22 RX ORDER — TRIAMCINOLONE ACETONIDE 40 MG/ML
40 INJECTION, SUSPENSION INTRA-ARTICULAR; INTRAMUSCULAR
Status: COMPLETED | OUTPATIENT
Start: 2025-01-22 | End: 2025-01-22

## 2025-01-22 RX ORDER — BUPIVACAINE HYDROCHLORIDE 5 MG/ML
3 INJECTION, SOLUTION PERINEURAL
Status: COMPLETED | OUTPATIENT
Start: 2025-01-22 | End: 2025-01-22

## 2025-01-22 RX ORDER — LIDOCAINE HYDROCHLORIDE 10 MG/ML
3 INJECTION, SOLUTION INFILTRATION; PERINEURAL
Status: COMPLETED | OUTPATIENT
Start: 2025-01-22 | End: 2025-01-22

## 2025-01-22 RX ADMIN — TRIAMCINOLONE ACETONIDE 40 MG: 40 INJECTION, SUSPENSION INTRA-ARTICULAR; INTRAMUSCULAR at 09:01

## 2025-01-22 RX ADMIN — LIDOCAINE HYDROCHLORIDE 3 ML: 10 INJECTION, SOLUTION INFILTRATION; PERINEURAL at 09:01

## 2025-01-22 RX ADMIN — BUPIVACAINE HYDROCHLORIDE 3 ML: 5 INJECTION, SOLUTION PERINEURAL at 09:01

## 2025-01-22 NOTE — LETTER
2025      Kameron Whitley  [address] [place] [address]      Dear Colleague,    Thank you for referring your patient, Kameron Whitley, to the [facility] SPORTS MEDICINE CLINIC [place]. Please see a copy of my visit note below.    Kameron Whitley  :  1950  DOS: 2025  MRN: [id number]    Sports Medicine Clinic Procedure    Ultrasound Guided Left Intra-Articular Hip Injection    Clinical History: Primary osteoarthritis of the left hip    Diagnosis:   1. Primary osteoarthritis of left hip      Referring Physician: Dr. Alexis Lawrence  Intraarticular Hip Injection - Ultrasound Guided  The patient was informed of the risks and the benefits of the procedure and a written consent was signed.  The patient s hip was prepped with chlorhexidine in sterile fashion.   Local anesthesia was performed using a 25-gauge 1.5-inch needle to administer 3 mL of 1% lidocaine without epinephrine.  1 mL (40 mg/mL) of triamcinolone suspension was drawn up into a 10 mL syringe with 3 mL of 1% lidocaine and 3 mL of 0.5% bupivacaine.   Injection was performed using sterile technique.  Under ultrasound guidance a 3.5-inch 22-gauge needle was used to enter the femoracetabular joint.  Anterior approach was used, needle placement was visualized and documented with ultrasound.  Ultrasound visualization was necessary due to decreased joint space in the setting of osteoarthritis.  Injection performed in-plane to the probe.  Injection solution visualized within the joint space.  Images were permanently stored for the patient's record.  There were no complications. The patient tolerated the procedure well. There was negligible bleeding.    Large Joint Injection/Arthocentesis: L hip joint    Date/Time: 2025 9:01 AM    Performed by: Avinash Shaw DO  Authorized by: Avinash Shaw DO    Indications:  Pain  Needle Size:  22 G  Guidance: ultrasound    Approach:  Anterior  Location:  Hip      Site:  L hip  joint  Medications:  40 mg triamcinolone 40 MG/ML; 3 mL lidocaine 1 %; 3 mL BUPivacaine 0.5 %  Outcome:  Tolerated well, no immediate complications  Procedure discussed: discussed risks, benefits, and alternatives    Consent Given by:  Patient  Prep: patient was prepped and draped in usual sterile fashion     3 mL 1% Lidocaine used for anesthetic     Ultrasound images of procedure were permanently stored.              Impression:  Successful ultrasound-guided left hip joint corticosteroid injection.    Plan:  - Injection:    - Expectations and goals of the injection were discussed and verbal and written consent was obtained.  - Performed a corticosteroid injection of the left hip today in clinic. Patient tolerated the procedure well without complications.    - Post-procedure instructions:    - Keep the injection site clean and dry.   - Do not submerge the injection site for 24 hours (no baths, pools). Showers are ok.   - Rest the area for 24-48 hours before resuming normal activities. Avoid overexerting the area for the first few weeks.   - It may take 2-3 days to start noticing the effects of the injection and up to 3-4 weeks to feel significant benefits.   - Follow up:          - With Dr. Lawrence as recommended for updates to treatment plan, or sooner for new/worsening symptoms.  - Patient has clinic contact information for questions or concerns.      Avinash Shaw DO, CAQSM  [facility] Sports Medicine  [facility] - Department of Orthopedic Surgery     Disclaimer:  This note was prepared and written using Dragon Medical dictation software. As a result, there may be errors in the script that have gone undetected. Please consider this when interpreting the information in this note.       Again, thank you for allowing me to participate in the care of your patient.        Sincerely,        Avinash Shaw DO    Electronically signed

## 2025-04-08 ENCOUNTER — MYC MEDICAL ADVICE (OUTPATIENT)
Dept: FAMILY MEDICINE | Facility: OTHER | Age: 75
End: 2025-04-08
Payer: COMMERCIAL

## 2025-04-20 ENCOUNTER — HEALTH MAINTENANCE LETTER (OUTPATIENT)
Age: 75
End: 2025-04-20

## 2025-05-07 DIAGNOSIS — M16.12 PRIMARY OSTEOARTHRITIS OF LEFT HIP: Primary | ICD-10-CM

## 2025-05-28 ENCOUNTER — MYC REFILL (OUTPATIENT)
Dept: FAMILY MEDICINE | Facility: OTHER | Age: 75
End: 2025-05-28
Payer: COMMERCIAL

## 2025-05-28 DIAGNOSIS — E78.5 HYPERLIPIDEMIA LDL GOAL <130: ICD-10-CM

## 2025-05-28 RX ORDER — ATORVASTATIN CALCIUM 20 MG/1
10 TABLET, FILM COATED ORAL DAILY
Qty: 90 TABLET | Refills: 1 | Status: SHIPPED | OUTPATIENT
Start: 2025-05-28

## 2025-05-31 SDOH — HEALTH STABILITY: PHYSICAL HEALTH: ON AVERAGE, HOW MANY MINUTES DO YOU ENGAGE IN EXERCISE AT THIS LEVEL?: 150+ MIN

## 2025-05-31 SDOH — HEALTH STABILITY: PHYSICAL HEALTH: ON AVERAGE, HOW MANY DAYS PER WEEK DO YOU ENGAGE IN MODERATE TO STRENUOUS EXERCISE (LIKE A BRISK WALK)?: 5 DAYS

## 2025-06-03 NOTE — PROGRESS NOTES
Kameron Whitley  :  1950  DOS: 2025  MRN: [id number]    Sports Medicine Clinic Procedure    Ultrasound Guided Left Intra-Articular Hip Injection    Clinical History: Left hip primary osteoarthritis    Diagnosis:   1. Primary osteoarthritis of left hip      Referring Physician: Dr. Alexis Lawrence  Large Joint Injection/Arthocentesis: L hip joint    Date/Time: 2025 11:06 AM    Performed by: Avinash Shaw DO  Authorized by: Avinash Shaw DO    Indications:  Pain  Needle Size:  22 G  Guidance: ultrasound    Approach:  Anterior  Location:  Hip      Site:  L hip joint  Medications:  40 mg triamcinolone 40 MG/ML; 3 mL lidocaine 1 %; 3 mL BUPivacaine 0.5 %  Outcome:  Tolerated well, no immediate complications  Procedure discussed: discussed risks, benefits, and alternatives    Consent Given by:  Patient  Prep: patient was prepped and draped in usual sterile fashion     3 mL 1% Lidocaine used for anesthetic     Ultrasound images of procedure were permanently stored.       Intraarticular Hip Injection - Ultrasound Guided  The patient was informed of the risks and the benefits of the procedure and a written consent was signed.  The patient s hip was prepped with chlorhexidine in sterile fashion.   Local anesthesia was performed using a 25-gauge 1.5-inch needle to administer 3 mL of 1% lidocaine without epinephrine.  1 mL (40 mg/mL) of triamcinolone suspension was drawn up into a 10 mL syringe with 3 mL of 1% lidocaine and 3 mL of 0.5% bupivacaine.   Injection was performed using sterile technique.  Under ultrasound guidance a 3.5-inch 22-gauge needle was used to enter the femoracetabular joint.  Anterior approach was used, needle placement was visualized and documented with ultrasound.  Ultrasound visualization was necessary due to decreased joint space in the setting of osteoarthritis.  Injection performed in-plane to the probe.  Injection solution visualized within the joint space.  Images were permanently  stored for the patient's record.  There were no complications. The patient tolerated the procedure well. There was negligible bleeding.       Impression:  Successful ultrasound guided left intra-articular hip joint corticosteroid injection.    Plan:  - Injection:    - Expectations and goals of the injection were discussed and verbal and written consent was obtained.  - Performed a corticosteroid injection of the left hip joint today in clinic. Patient tolerated the procedure well without complications.    - Post-procedure instructions:    - Keep the injection site clean and dry.   - Do not submerge the injection site for 24 hours (no baths, pools). Showers are ok.   - Rest the area for 24-48 hours before resuming normal activities. Avoid overexerting the area for the first few weeks.   - It may take 2-3 days to start noticing the effects of the injection and up to 3-4 weeks to feel significant benefits.   - Follow up:          - With Dr. Lawrence as recommended for updates to treatment plan, or sooner for new/worsening symptoms.  - Patient has clinic contact information for questions or concerns.      Avinash Shaw DO, CAPHIM  [facility] - Sports Medicine  [facility] - Department of Orthopedic Surgery     Disclaimer:  This note was prepared and written using Dragon Medical dictation software. As a result, there may be errors in the script that have gone undetected. Please consider this when interpreting the information in this note.

## 2025-06-04 ENCOUNTER — OFFICE VISIT (OUTPATIENT)
Dept: ORTHOPEDICS | Facility: CLINIC | Age: 75
End: 2025-06-04
Payer: COMMERCIAL

## 2025-06-04 DIAGNOSIS — M16.12 PRIMARY OSTEOARTHRITIS OF LEFT HIP: Primary | ICD-10-CM

## 2025-06-04 PROCEDURE — 20611 DRAIN/INJ JOINT/BURSA W/US: CPT | Mod: LT | Performed by: STUDENT IN AN ORGANIZED HEALTH CARE EDUCATION/TRAINING PROGRAM

## 2025-06-04 PROCEDURE — 99207 PR DROP WITH A PROCEDURE: CPT | Performed by: STUDENT IN AN ORGANIZED HEALTH CARE EDUCATION/TRAINING PROGRAM

## 2025-06-04 RX ORDER — BUPIVACAINE HYDROCHLORIDE 5 MG/ML
3 INJECTION, SOLUTION PERINEURAL
Status: COMPLETED | OUTPATIENT
Start: 2025-06-04 | End: 2025-06-04

## 2025-06-04 RX ORDER — TRIAMCINOLONE ACETONIDE 40 MG/ML
40 INJECTION, SUSPENSION INTRA-ARTICULAR; INTRAMUSCULAR
Status: COMPLETED | OUTPATIENT
Start: 2025-06-04 | End: 2025-06-04

## 2025-06-04 RX ORDER — LIDOCAINE HYDROCHLORIDE 10 MG/ML
3 INJECTION, SOLUTION INFILTRATION; PERINEURAL
Status: COMPLETED | OUTPATIENT
Start: 2025-06-04 | End: 2025-06-04

## 2025-06-04 RX ADMIN — LIDOCAINE HYDROCHLORIDE 3 ML: 10 INJECTION, SOLUTION INFILTRATION; PERINEURAL at 11:06

## 2025-06-04 RX ADMIN — BUPIVACAINE HYDROCHLORIDE 3 ML: 5 INJECTION, SOLUTION PERINEURAL at 11:06

## 2025-06-04 RX ADMIN — TRIAMCINOLONE ACETONIDE 40 MG: 40 INJECTION, SUSPENSION INTRA-ARTICULAR; INTRAMUSCULAR at 11:06

## 2025-06-04 NOTE — LETTER
2025      Kameron Whitley  [address] [place] [address]      Dear Colleague,    Thank you for referring your patient, Kameron Whitley, to the [facility] SPORTS MEDICINE CLINIC [place]. Please see a copy of my visit note below.    Kameron Whitley  :  1950  DOS: 2025  MRN: [id number]    Sports Medicine Clinic Procedure    Ultrasound Guided Left Intra-Articular Hip Injection    Clinical History: Left hip primary osteoarthritis    Diagnosis:   1. Primary osteoarthritis of left hip      Referring Physician: Dr. Alexis Lawrence  Large Joint Injection/Arthocentesis: L hip joint    Date/Time: 2025 11:06 AM    Performed by: Avinash Shaw DO  Authorized by: Avinash Shaw DO    Indications:  Pain  Needle Size:  22 G  Guidance: ultrasound    Approach:  Anterior  Location:  Hip      Site:  L hip joint  Medications:  40 mg triamcinolone 40 MG/ML; 3 mL lidocaine 1 %; 3 mL BUPivacaine 0.5 %  Outcome:  Tolerated well, no immediate complications  Procedure discussed: discussed risks, benefits, and alternatives    Consent Given by:  Patient  Prep: patient was prepped and draped in usual sterile fashion     3 mL 1% Lidocaine used for anesthetic     Ultrasound images of procedure were permanently stored.       Intraarticular Hip Injection - Ultrasound Guided  The patient was informed of the risks and the benefits of the procedure and a written consent was signed.  The patient s hip was prepped with chlorhexidine in sterile fashion.   Local anesthesia was performed using a 25-gauge 1.5-inch needle to administer 3 mL of 1% lidocaine without epinephrine.  1 mL (40 mg/mL) of triamcinolone suspension was drawn up into a 10 mL syringe with 3 mL of 1% lidocaine and 3 mL of 0.5% bupivacaine.   Injection was performed using sterile technique.  Under ultrasound guidance a 3.5-inch 22-gauge needle was used to enter the femoracetabular joint.  Anterior approach was used, needle placement was visualized  and documented with ultrasound.  Ultrasound visualization was necessary due to decreased joint space in the setting of osteoarthritis.  Injection performed in-plane to the probe.  Injection solution visualized within the joint space.  Images were permanently stored for the patient's record.  There were no complications. The patient tolerated the procedure well. There was negligible bleeding.       Impression:  Successful ultrasound guided left intra-articular hip joint corticosteroid injection.    Plan:  - Injection:    - Expectations and goals of the injection were discussed and verbal and written consent was obtained.  - Performed a corticosteroid injection of the left hip joint today in clinic. Patient tolerated the procedure well without complications.    - Post-procedure instructions:    - Keep the injection site clean and dry.   - Do not submerge the injection site for 24 hours (no baths, pools). Showers are ok.   - Rest the area for 24-48 hours before resuming normal activities. Avoid overexerting the area for the first few weeks.   - It may take 2-3 days to start noticing the effects of the injection and up to 3-4 weeks to feel significant benefits.   - Follow up:          - With Dr. Lawrence as recommended for updates to treatment plan, or sooner for new/worsening symptoms.  - Patient has clinic contact information for questions or concerns.      Avinash Shaw DO, CAQSM  [facility] - Sports Medicine  [facility] - Department of Orthopedic Surgery     Disclaimer:  This note was prepared and written using Dragon Medical dictation software. As a result, there may be errors in the script that have gone undetected. Please consider this when interpreting the information in this note.       Again, thank you for allowing me to participate in the care of your patient.        Sincerely,        Avinash Shaw DO    Electronically signed

## 2025-09-04 ENCOUNTER — OFFICE VISIT (OUTPATIENT)
Dept: ORTHOPEDICS | Facility: CLINIC | Age: 75
End: 2025-09-04
Payer: COMMERCIAL

## 2025-09-04 ENCOUNTER — TELEPHONE (OUTPATIENT)
Dept: ORTHOPEDICS | Facility: CLINIC | Age: 75
End: 2025-09-04

## 2025-09-04 VITALS — BODY MASS INDEX: 30.83 KG/M2 | WEIGHT: 174 LBS | HEIGHT: 63 IN

## 2025-09-04 DIAGNOSIS — M16.12 PRIMARY OSTEOARTHRITIS OF LEFT HIP: Primary | ICD-10-CM

## 2025-09-04 RX ORDER — ASPIRIN 81 MG/1
81 TABLET ORAL DAILY
COMMUNITY

## (undated) DEVICE — BNDG ELASTIC 4"X5YDS UNSTERILE 6611-40

## (undated) DEVICE — DRSG XEROFORM 1X8"

## (undated) DEVICE — GLOVE PROTEXIS W/NEU-THERA 7.0  2D73TE70

## (undated) DEVICE — SOL ADH LIQUID BENZOIN SWAB 0.6ML C1544

## (undated) DEVICE — BLADE KNIFE SURG 15 371115

## (undated) DEVICE — SOL NACL 0.9% IRRIG 1000ML BOTTLE 07138-09

## (undated) DEVICE — TUBING SUCTION 12"X1/4" N612

## (undated) DEVICE — Device

## (undated) DEVICE — PACK HAND WRIST SOP15HWFSP

## (undated) DEVICE — LUBRICATING JELLY 4.25OZ

## (undated) DEVICE — CAST PLASTER SPLINT 4X15" 7394

## (undated) DEVICE — PREP CHLORAPREP 26ML TINTED ORANGE  260815

## (undated) DEVICE — SOL WATER IRRIG 1000ML BOTTLE 07139-09

## (undated) DEVICE — SU ETHILON 4-0 FS-2 18" 662H

## (undated) DEVICE — SU MERSILENE 4-0 P-3 18" R691G

## (undated) DEVICE — DRAPE C-ARM MINI 5423

## (undated) DEVICE — DRAPE STERI TOWEL SM 1000

## (undated) DEVICE — GLOVE EXAM NITRILE LG

## (undated) DEVICE — KIT ENDO TURNOVER/PROCEDURE CARRY-ON 101822

## (undated) DEVICE — SU MONOCRYL 3-0 SH 27" UND Y416H

## (undated) RX ORDER — CEFAZOLIN SODIUM 2 G/100ML
INJECTION, SOLUTION INTRAVENOUS
Status: DISPENSED
Start: 2019-02-08

## (undated) RX ORDER — ONDANSETRON 2 MG/ML
INJECTION INTRAMUSCULAR; INTRAVENOUS
Status: DISPENSED
Start: 2019-02-08

## (undated) RX ORDER — LIDOCAINE HYDROCHLORIDE 20 MG/ML
INJECTION, SOLUTION INFILTRATION; PERINEURAL
Status: DISPENSED
Start: 2019-02-08

## (undated) RX ORDER — PROPOFOL 10 MG/ML
INJECTION, EMULSION INTRAVENOUS
Status: DISPENSED
Start: 2019-02-08

## (undated) RX ORDER — FENTANYL CITRATE 50 UG/ML
INJECTION, SOLUTION INTRAMUSCULAR; INTRAVENOUS
Status: DISPENSED
Start: 2019-02-08

## (undated) RX ORDER — ACETAMINOPHEN 325 MG/1
TABLET ORAL
Status: DISPENSED
Start: 2019-02-08